# Patient Record
Sex: FEMALE | Race: WHITE
[De-identification: names, ages, dates, MRNs, and addresses within clinical notes are randomized per-mention and may not be internally consistent; named-entity substitution may affect disease eponyms.]

---

## 2017-03-10 ENCOUNTER — APPOINTMENT (OUTPATIENT)
Dept: OBGYN | Facility: CLINIC | Age: 32
End: 2017-03-10

## 2017-03-10 VITALS
HEIGHT: 67 IN | SYSTOLIC BLOOD PRESSURE: 100 MMHG | BODY MASS INDEX: 31.39 KG/M2 | DIASTOLIC BLOOD PRESSURE: 60 MMHG | WEIGHT: 200 LBS

## 2017-03-10 RX ORDER — MEDROXYPROGESTERONE ACETATE 150 MG/ML
150 INJECTION, SUSPENSION INTRAMUSCULAR
Qty: 0 | Refills: 0 | Status: COMPLETED | OUTPATIENT
Start: 2017-03-10

## 2017-03-10 RX ADMIN — MEDROXYPROGESTERONE ACETATE 0 MG/ML: 150 INJECTION, SUSPENSION INTRAMUSCULAR at 00:00

## 2017-03-13 ENCOUNTER — RECORD ABSTRACTING (OUTPATIENT)
Age: 32
End: 2017-03-13

## 2017-06-02 ENCOUNTER — APPOINTMENT (OUTPATIENT)
Dept: OBGYN | Facility: CLINIC | Age: 32
End: 2017-06-02

## 2017-06-02 VITALS
HEIGHT: 66 IN | SYSTOLIC BLOOD PRESSURE: 116 MMHG | BODY MASS INDEX: 31.98 KG/M2 | WEIGHT: 199 LBS | DIASTOLIC BLOOD PRESSURE: 80 MMHG

## 2017-06-02 RX ADMIN — MEDROXYPROGESTERONE ACETATE 0 MG/ML: 150 INJECTION, SUSPENSION INTRAMUSCULAR at 00:00

## 2017-06-06 ENCOUNTER — OUTPATIENT (OUTPATIENT)
Dept: OUTPATIENT SERVICES | Facility: HOSPITAL | Age: 32
LOS: 1 days | Discharge: HOME | End: 2017-06-06

## 2017-06-28 DIAGNOSIS — Z01.21 ENCOUNTER FOR DENTAL EXAMINATION AND CLEANING WITH ABNORMAL FINDINGS: ICD-10-CM

## 2017-07-27 ENCOUNTER — OUTPATIENT (OUTPATIENT)
Dept: OUTPATIENT SERVICES | Facility: HOSPITAL | Age: 32
LOS: 1 days | Discharge: HOME | End: 2017-07-27

## 2017-07-28 DIAGNOSIS — Z79.899 OTHER LONG TERM (CURRENT) DRUG THERAPY: ICD-10-CM

## 2017-07-28 DIAGNOSIS — F20.9 SCHIZOPHRENIA, UNSPECIFIED: ICD-10-CM

## 2017-08-04 ENCOUNTER — OUTPATIENT (OUTPATIENT)
Dept: OUTPATIENT SERVICES | Facility: HOSPITAL | Age: 32
LOS: 1 days | Discharge: HOME | End: 2017-08-04

## 2017-08-18 ENCOUNTER — OUTPATIENT (OUTPATIENT)
Dept: OUTPATIENT SERVICES | Facility: HOSPITAL | Age: 32
LOS: 1 days | Discharge: HOME | End: 2017-08-18

## 2017-08-18 DIAGNOSIS — K02.62 DENTAL CARIES ON SMOOTH SURFACE PENETRATING INTO DENTIN: ICD-10-CM

## 2017-08-25 ENCOUNTER — OUTPATIENT (OUTPATIENT)
Dept: OUTPATIENT SERVICES | Facility: HOSPITAL | Age: 32
LOS: 1 days | Discharge: HOME | End: 2017-08-25

## 2017-08-25 ENCOUNTER — APPOINTMENT (OUTPATIENT)
Dept: OBGYN | Facility: CLINIC | Age: 32
End: 2017-08-25

## 2017-08-25 VITALS — DIASTOLIC BLOOD PRESSURE: 80 MMHG | BODY MASS INDEX: 31.64 KG/M2 | WEIGHT: 196 LBS | SYSTOLIC BLOOD PRESSURE: 120 MMHG

## 2017-08-25 RX ORDER — MEDROXYPROGESTERONE ACETATE 150 MG/ML
150 INJECTION, SUSPENSION INTRAMUSCULAR
Qty: 0 | Refills: 0 | Status: COMPLETED | OUTPATIENT
Start: 2017-08-25

## 2017-08-25 RX ADMIN — MEDROXYPROGESTERONE ACETATE 0 MG/ML: 150 INJECTION, SUSPENSION INTRAMUSCULAR at 00:00

## 2017-11-17 ENCOUNTER — APPOINTMENT (OUTPATIENT)
Dept: OBGYN | Facility: CLINIC | Age: 32
End: 2017-11-17

## 2017-11-17 ENCOUNTER — RESULT REVIEW (OUTPATIENT)
Age: 32
End: 2017-11-17

## 2017-11-17 ENCOUNTER — OUTPATIENT (OUTPATIENT)
Dept: OUTPATIENT SERVICES | Facility: HOSPITAL | Age: 32
LOS: 1 days | Discharge: HOME | End: 2017-11-17

## 2017-11-17 VITALS — WEIGHT: 195 LBS | SYSTOLIC BLOOD PRESSURE: 118 MMHG | BODY MASS INDEX: 31.47 KG/M2 | DIASTOLIC BLOOD PRESSURE: 70 MMHG

## 2017-11-17 RX ORDER — MEDROXYPROGESTERONE ACETATE 150 MG/ML
150 INJECTION, SUSPENSION INTRAMUSCULAR
Qty: 0 | Refills: 0 | Status: COMPLETED | OUTPATIENT
Start: 2017-11-17

## 2017-11-17 RX ADMIN — MEDROXYPROGESTERONE ACETATE 0 MG/ML: 150 INJECTION, SUSPENSION INTRAMUSCULAR at 00:00

## 2017-11-20 LAB — HIV1+2 AB SPEC QL IA.RAPID: NONREACTIVE

## 2017-11-22 LAB
A VAGINAE DNA VAG NAA+PROBE-LOG#: NOT DETECTED
BV SCORE VAG QL NAA+PROBE: NORMAL
C GLABRATA DNA VAG QL NAA+PROBE: NOT DETECTED
C PARAP DNA VAG QL NAA+PROBE: NOT DETECTED
C TRACH RRNA SPEC QL NAA+PROBE: NOT DETECTED
C TROPICLS DNA VAG QL NAA+PROBE: NOT DETECTED
CANDIDA DNA VAG QL NAA+PROBE: NOT DETECTED
G VAGINALIS DNA VAG NAA+PROBE-LOG#: NOT DETECTED
LACTOBACILLUS DNA VAG NAA+PROBE-LOG#: 6.7
MEGASPHAERA SP DNA VAG NAA+PROBE-LOG#: NOT DETECTED
N GONORRHOEA RRNA SPEC QL NAA+PROBE: NOT DETECTED
T VAGINALIS RRNA SPEC QL NAA+PROBE: NOT DETECTED

## 2018-02-09 ENCOUNTER — OUTPATIENT (OUTPATIENT)
Dept: OUTPATIENT SERVICES | Facility: HOSPITAL | Age: 33
LOS: 1 days | Discharge: HOME | End: 2018-02-09

## 2018-02-09 ENCOUNTER — APPOINTMENT (OUTPATIENT)
Dept: OBGYN | Facility: CLINIC | Age: 33
End: 2018-02-09

## 2018-02-09 VITALS — BODY MASS INDEX: 30.99 KG/M2 | DIASTOLIC BLOOD PRESSURE: 70 MMHG | SYSTOLIC BLOOD PRESSURE: 110 MMHG | WEIGHT: 192 LBS

## 2018-02-09 DIAGNOSIS — Z30.09 ENCOUNTER FOR OTHER GENERAL COUNSELING AND ADVICE ON CONTRACEPTION: ICD-10-CM

## 2018-02-09 DIAGNOSIS — N60.12 DIFFUSE CYSTIC MASTOPATHY OF RIGHT BREAST: ICD-10-CM

## 2018-02-09 DIAGNOSIS — N60.11 DIFFUSE CYSTIC MASTOPATHY OF RIGHT BREAST: ICD-10-CM

## 2018-02-09 DIAGNOSIS — Z12.72 ENCOUNTER FOR SCREENING FOR MALIGNANT NEOPLASM OF VAGINA: ICD-10-CM

## 2018-02-09 RX ORDER — MEDROXYPROGESTERONE ACETATE 150 MG/ML
150 INJECTION, SUSPENSION INTRAMUSCULAR
Refills: 0 | Status: COMPLETED | OUTPATIENT
Start: 2018-02-09

## 2018-02-09 RX ADMIN — MEDROXYPROGESTERONE ACETATE 0 MG/ML: 150 INJECTION, SUSPENSION INTRAMUSCULAR at 00:00

## 2018-05-04 ENCOUNTER — APPOINTMENT (OUTPATIENT)
Dept: OBGYN | Facility: CLINIC | Age: 33
End: 2018-05-04

## 2018-05-04 ENCOUNTER — OUTPATIENT (OUTPATIENT)
Dept: OUTPATIENT SERVICES | Facility: HOSPITAL | Age: 33
LOS: 1 days | Discharge: HOME | End: 2018-05-04

## 2018-05-04 VITALS
BODY MASS INDEX: 30.61 KG/M2 | WEIGHT: 190.44 LBS | HEIGHT: 66 IN | SYSTOLIC BLOOD PRESSURE: 104 MMHG | DIASTOLIC BLOOD PRESSURE: 70 MMHG

## 2018-05-04 RX ADMIN — MEDROXYPROGESTERONE ACETATE 0 MG/ML: 150 INJECTION, SUSPENSION INTRAMUSCULAR at 00:00

## 2018-05-07 DIAGNOSIS — Z30.42 ENCOUNTER FOR SURVEILLANCE OF INJECTABLE CONTRACEPTIVE: ICD-10-CM

## 2018-05-09 ENCOUNTER — OUTPATIENT (OUTPATIENT)
Dept: OUTPATIENT SERVICES | Facility: HOSPITAL | Age: 33
LOS: 1 days | Discharge: HOME | End: 2018-05-09

## 2018-05-09 DIAGNOSIS — Z00.00 ENCOUNTER FOR GENERAL ADULT MEDICAL EXAMINATION WITHOUT ABNORMAL FINDINGS: ICD-10-CM

## 2018-07-20 ENCOUNTER — OUTPATIENT (OUTPATIENT)
Dept: OUTPATIENT SERVICES | Facility: HOSPITAL | Age: 33
LOS: 1 days | Discharge: HOME | End: 2018-07-20

## 2018-07-20 ENCOUNTER — APPOINTMENT (OUTPATIENT)
Dept: OBGYN | Facility: CLINIC | Age: 33
End: 2018-07-20

## 2018-07-20 VITALS — SYSTOLIC BLOOD PRESSURE: 110 MMHG | DIASTOLIC BLOOD PRESSURE: 62 MMHG | WEIGHT: 193 LBS | BODY MASS INDEX: 31.15 KG/M2

## 2018-07-20 RX ADMIN — MEDROXYPROGESTERONE ACETATE 0 MG/ML: 150 INJECTION, SUSPENSION INTRAMUSCULAR at 00:00

## 2018-07-23 DIAGNOSIS — Z30.42 ENCOUNTER FOR SURVEILLANCE OF INJECTABLE CONTRACEPTIVE: ICD-10-CM

## 2018-08-14 ENCOUNTER — OUTPATIENT (OUTPATIENT)
Dept: OUTPATIENT SERVICES | Facility: HOSPITAL | Age: 33
LOS: 1 days | Discharge: HOME | End: 2018-08-14

## 2018-08-15 DIAGNOSIS — R79.89 OTHER SPECIFIED ABNORMAL FINDINGS OF BLOOD CHEMISTRY: ICD-10-CM

## 2018-10-05 ENCOUNTER — OUTPATIENT (OUTPATIENT)
Dept: OUTPATIENT SERVICES | Facility: HOSPITAL | Age: 33
LOS: 1 days | Discharge: HOME | End: 2018-10-05

## 2018-10-05 ENCOUNTER — APPOINTMENT (OUTPATIENT)
Dept: OBGYN | Facility: CLINIC | Age: 33
End: 2018-10-05

## 2018-10-05 VITALS
SYSTOLIC BLOOD PRESSURE: 122 MMHG | HEIGHT: 66 IN | WEIGHT: 191.03 LBS | DIASTOLIC BLOOD PRESSURE: 78 MMHG | BODY MASS INDEX: 30.7 KG/M2

## 2018-10-05 RX ADMIN — MEDROXYPROGESTERONE ACETATE 0 MG/ML: 150 INJECTION, SUSPENSION INTRAMUSCULAR at 00:00

## 2018-10-09 DIAGNOSIS — Z30.42 ENCOUNTER FOR SURVEILLANCE OF INJECTABLE CONTRACEPTIVE: ICD-10-CM

## 2018-12-28 ENCOUNTER — APPOINTMENT (OUTPATIENT)
Dept: OBGYN | Facility: CLINIC | Age: 33
End: 2018-12-28

## 2018-12-28 ENCOUNTER — OUTPATIENT (OUTPATIENT)
Dept: OUTPATIENT SERVICES | Facility: HOSPITAL | Age: 33
LOS: 1 days | Discharge: HOME | End: 2018-12-28

## 2018-12-28 VITALS
SYSTOLIC BLOOD PRESSURE: 110 MMHG | WEIGHT: 189.19 LBS | BODY MASS INDEX: 30.54 KG/M2 | DIASTOLIC BLOOD PRESSURE: 76 MMHG

## 2018-12-28 RX ORDER — MEDROXYPROGESTERONE ACETATE 150 MG/ML
150 INJECTION, SUSPENSION INTRAMUSCULAR
Qty: 0 | Refills: 0 | Status: COMPLETED | OUTPATIENT
Start: 2018-12-28

## 2018-12-31 DIAGNOSIS — Z30.42 ENCOUNTER FOR SURVEILLANCE OF INJECTABLE CONTRACEPTIVE: ICD-10-CM

## 2019-02-27 ENCOUNTER — OUTPATIENT (OUTPATIENT)
Dept: OUTPATIENT SERVICES | Facility: HOSPITAL | Age: 34
LOS: 1 days | Discharge: HOME | End: 2019-02-27

## 2019-02-27 DIAGNOSIS — Z79.899 OTHER LONG TERM (CURRENT) DRUG THERAPY: ICD-10-CM

## 2019-02-27 DIAGNOSIS — E03.9 HYPOTHYROIDISM, UNSPECIFIED: ICD-10-CM

## 2019-02-27 DIAGNOSIS — K21.9 GASTRO-ESOPHAGEAL REFLUX DISEASE WITHOUT ESOPHAGITIS: ICD-10-CM

## 2019-03-29 ENCOUNTER — OUTPATIENT (OUTPATIENT)
Dept: OUTPATIENT SERVICES | Facility: HOSPITAL | Age: 34
LOS: 1 days | Discharge: HOME | End: 2019-03-29

## 2019-03-29 ENCOUNTER — APPOINTMENT (OUTPATIENT)
Dept: OBGYN | Facility: CLINIC | Age: 34
End: 2019-03-29

## 2019-03-29 VITALS
WEIGHT: 187 LBS | BODY MASS INDEX: 30.05 KG/M2 | DIASTOLIC BLOOD PRESSURE: 80 MMHG | SYSTOLIC BLOOD PRESSURE: 120 MMHG | HEIGHT: 66 IN

## 2019-03-29 DIAGNOSIS — Z30.42 ENCOUNTER FOR SURVEILLANCE OF INJECTABLE CONTRACEPTIVE: ICD-10-CM

## 2019-03-29 DIAGNOSIS — Z01.419 ENCOUNTER FOR GYNECOLOGICAL EXAMINATION (GENERAL) (ROUTINE) WITHOUT ABNORMAL FINDINGS: ICD-10-CM

## 2019-03-29 RX ADMIN — MEDROXYPROGESTERONE ACETATE 0 MG/ML: 150 INJECTION, SUSPENSION INTRAMUSCULAR at 00:00

## 2019-03-29 NOTE — END OF VISIT
[] : Resident [FreeTextEntry3] : 34 yo here for annual and DMPA redose, on time, happy with method, no complains. Denies breast issues at the moment. pap/hpv and vaginitis panel collected

## 2019-03-29 NOTE — PHYSICAL EXAM
[Awake] : awake [Alert] : alert [Soft] : soft [Oriented x3] : oriented to person, place, and time [Labia Majora] : labia major [Labia Minora] : labia minora [Normal] : clitoris [No Bleeding] : there was no active vaginal bleeding [Pap Obtained] : a Pap smear was performed [Uterine Adnexae] : were not tender and not enlarged [RRR, No Murmurs] : RRR, no murmurs [CTAB] : CTAB [Acute Distress] : no acute distress [Mass] : no breast mass [Nipple Discharge] : no nipple discharge [Axillary LAD] : no axillary lymphadenopathy [Tender] : non tender [Distended] : not distended

## 2019-04-03 LAB
C TRACH RRNA SPEC QL NAA+PROBE: NOT DETECTED
HPV HIGH+LOW RISK DNA PNL CVX: NOT DETECTED
N GONORRHOEA RRNA SPEC QL NAA+PROBE: NOT DETECTED
SOURCE AMPLIFICATION: NORMAL

## 2019-05-15 ENCOUNTER — OUTPATIENT (OUTPATIENT)
Dept: OUTPATIENT SERVICES | Facility: HOSPITAL | Age: 34
LOS: 1 days | Discharge: HOME | End: 2019-05-15

## 2019-05-15 DIAGNOSIS — F20.9 SCHIZOPHRENIA, UNSPECIFIED: ICD-10-CM

## 2019-05-15 DIAGNOSIS — Z79.899 OTHER LONG TERM (CURRENT) DRUG THERAPY: ICD-10-CM

## 2019-06-21 ENCOUNTER — OUTPATIENT (OUTPATIENT)
Dept: OUTPATIENT SERVICES | Facility: HOSPITAL | Age: 34
LOS: 1 days | Discharge: HOME | End: 2019-06-21

## 2019-06-21 ENCOUNTER — APPOINTMENT (OUTPATIENT)
Dept: OBGYN | Facility: CLINIC | Age: 34
End: 2019-06-21
Payer: COMMERCIAL

## 2019-06-21 VITALS — DIASTOLIC BLOOD PRESSURE: 72 MMHG | BODY MASS INDEX: 30.02 KG/M2 | WEIGHT: 186 LBS | SYSTOLIC BLOOD PRESSURE: 118 MMHG

## 2019-06-21 PROCEDURE — 99212 OFFICE O/P EST SF 10 MIN: CPT

## 2019-06-21 RX ADMIN — MEDROXYPROGESTERONE ACETATE 0 MG/ML: 150 INJECTION, SUSPENSION INTRAMUSCULAR at 00:00

## 2019-06-23 NOTE — COUNSELING
[Nutrition] : nutrition [Exercise] : exercise [Vitamins/Supplements] : vitamins/supplements [STD (testing, results, tx)] : STD (testing, results, tx) [Menstrual Calendar] : menstrual calendar [Contraception] : contraception [Emergency Contraception] : emergency contraception [Safe Sexual Practices] : safe sexual practices

## 2019-06-26 DIAGNOSIS — Z30.42 ENCOUNTER FOR SURVEILLANCE OF INJECTABLE CONTRACEPTIVE: ICD-10-CM

## 2019-09-06 ENCOUNTER — OUTPATIENT (OUTPATIENT)
Dept: OUTPATIENT SERVICES | Facility: HOSPITAL | Age: 34
LOS: 1 days | Discharge: HOME | End: 2019-09-06

## 2019-09-06 ENCOUNTER — APPOINTMENT (OUTPATIENT)
Dept: OBGYN | Facility: CLINIC | Age: 34
End: 2019-09-06
Payer: COMMERCIAL

## 2019-09-06 VITALS
HEIGHT: 66 IN | SYSTOLIC BLOOD PRESSURE: 114 MMHG | BODY MASS INDEX: 28.93 KG/M2 | DIASTOLIC BLOOD PRESSURE: 72 MMHG | WEIGHT: 180 LBS

## 2019-09-06 PROCEDURE — 99212 OFFICE O/P EST SF 10 MIN: CPT

## 2019-09-06 RX ADMIN — MEDROXYPROGESTERONE ACETATE 0 MG/ML: 150 INJECTION, SUSPENSION INTRAMUSCULAR at 00:00

## 2019-09-10 DIAGNOSIS — Z30.42 ENCOUNTER FOR SURVEILLANCE OF INJECTABLE CONTRACEPTIVE: ICD-10-CM

## 2019-09-25 NOTE — COUNSELING
[Nutrition] : nutrition [Exercise] : exercise [Vitamins/Supplements] : vitamins/supplements [Contraception] : contraception [Emergency Contraception] : emergency contraception [Safe Sexual Practices] : safe sexual practices

## 2019-10-29 ENCOUNTER — OUTPATIENT (OUTPATIENT)
Dept: OUTPATIENT SERVICES | Facility: HOSPITAL | Age: 34
LOS: 1 days | Discharge: HOME | End: 2019-10-29

## 2019-11-29 ENCOUNTER — OUTPATIENT (OUTPATIENT)
Dept: OUTPATIENT SERVICES | Facility: HOSPITAL | Age: 34
LOS: 1 days | Discharge: HOME | End: 2019-11-29

## 2019-11-29 ENCOUNTER — RESULT CHARGE (OUTPATIENT)
Age: 34
End: 2019-11-29

## 2019-11-29 ENCOUNTER — APPOINTMENT (OUTPATIENT)
Dept: OBGYN | Facility: CLINIC | Age: 34
End: 2019-11-29
Payer: COMMERCIAL

## 2019-11-29 ENCOUNTER — MED ADMIN CHARGE (OUTPATIENT)
Age: 34
End: 2019-11-29

## 2019-11-29 VITALS
WEIGHT: 183 LBS | HEIGHT: 66 IN | SYSTOLIC BLOOD PRESSURE: 120 MMHG | DIASTOLIC BLOOD PRESSURE: 80 MMHG | BODY MASS INDEX: 29.41 KG/M2

## 2019-11-29 DIAGNOSIS — Z30.42 ENCOUNTER FOR SURVEILLANCE OF INJECTABLE CONTRACEPTIVE: ICD-10-CM

## 2019-11-29 PROCEDURE — 99211 OFF/OP EST MAY X REQ PHY/QHP: CPT

## 2019-11-29 RX ADMIN — MEDROXYPROGESTERONE ACETATE 0 MG/ML: 150 INJECTION, SUSPENSION INTRAMUSCULAR at 00:00

## 2019-11-29 NOTE — COUNSELING
[STD (testing, results, tx)] : STD (testing, results, tx) [Vaccines] : vaccines [Contraception] : contraception

## 2019-11-30 LAB
HCG UR QL: NEGATIVE
QUALITY CONTROL: YES

## 2019-12-06 ENCOUNTER — OUTPATIENT (OUTPATIENT)
Dept: OUTPATIENT SERVICES | Facility: HOSPITAL | Age: 34
LOS: 1 days | Discharge: HOME | End: 2019-12-06

## 2019-12-13 ENCOUNTER — OUTPATIENT (OUTPATIENT)
Dept: OUTPATIENT SERVICES | Facility: HOSPITAL | Age: 34
LOS: 1 days | Discharge: HOME | End: 2019-12-13

## 2019-12-20 ENCOUNTER — OUTPATIENT (OUTPATIENT)
Dept: OUTPATIENT SERVICES | Facility: HOSPITAL | Age: 34
LOS: 1 days | Discharge: HOME | End: 2019-12-20

## 2019-12-27 ENCOUNTER — OUTPATIENT (OUTPATIENT)
Dept: OUTPATIENT SERVICES | Facility: HOSPITAL | Age: 34
LOS: 1 days | Discharge: HOME | End: 2019-12-27

## 2020-01-03 ENCOUNTER — OUTPATIENT (OUTPATIENT)
Dept: OUTPATIENT SERVICES | Facility: HOSPITAL | Age: 35
LOS: 1 days | Discharge: HOME | End: 2020-01-03

## 2020-01-03 DIAGNOSIS — K02.62 DENTAL CARIES ON SMOOTH SURFACE PENETRATING INTO DENTIN: ICD-10-CM

## 2020-01-31 ENCOUNTER — APPOINTMENT (OUTPATIENT)
Dept: OBGYN | Facility: CLINIC | Age: 35
End: 2020-01-31

## 2020-01-31 ENCOUNTER — OUTPATIENT (OUTPATIENT)
Dept: OUTPATIENT SERVICES | Facility: HOSPITAL | Age: 35
LOS: 1 days | Discharge: HOME | End: 2020-01-31

## 2020-01-31 DIAGNOSIS — Z02.9 ENCOUNTER FOR ADMINISTRATIVE EXAMINATIONS, UNSPECIFIED: ICD-10-CM

## 2020-02-21 ENCOUNTER — APPOINTMENT (OUTPATIENT)
Dept: OBGYN | Facility: CLINIC | Age: 35
End: 2020-02-21
Payer: COMMERCIAL

## 2020-02-21 ENCOUNTER — OUTPATIENT (OUTPATIENT)
Dept: OUTPATIENT SERVICES | Facility: HOSPITAL | Age: 35
LOS: 1 days | Discharge: HOME | End: 2020-02-21

## 2020-02-21 VITALS
SYSTOLIC BLOOD PRESSURE: 112 MMHG | WEIGHT: 183 LBS | HEIGHT: 66 IN | BODY MASS INDEX: 29.41 KG/M2 | DIASTOLIC BLOOD PRESSURE: 70 MMHG

## 2020-02-21 PROCEDURE — 99211 OFF/OP EST MAY X REQ PHY/QHP: CPT

## 2020-02-21 RX ADMIN — MEDROXYPROGESTERONE ACETATE 0 MG/ML: 150 INJECTION, SUSPENSION INTRAMUSCULAR at 00:00

## 2020-02-24 DIAGNOSIS — Z30.42 ENCOUNTER FOR SURVEILLANCE OF INJECTABLE CONTRACEPTIVE: ICD-10-CM

## 2020-05-08 ENCOUNTER — APPOINTMENT (OUTPATIENT)
Dept: OBGYN | Facility: CLINIC | Age: 35
End: 2020-05-08
Payer: COMMERCIAL

## 2020-05-08 ENCOUNTER — OUTPATIENT (OUTPATIENT)
Dept: OUTPATIENT SERVICES | Facility: HOSPITAL | Age: 35
LOS: 1 days | Discharge: HOME | End: 2020-05-08

## 2020-05-08 VITALS
HEIGHT: 66 IN | SYSTOLIC BLOOD PRESSURE: 116 MMHG | DIASTOLIC BLOOD PRESSURE: 76 MMHG | WEIGHT: 177.38 LBS | BODY MASS INDEX: 28.51 KG/M2

## 2020-05-08 DIAGNOSIS — Z86.59 PERSONAL HISTORY OF OTHER MENTAL AND BEHAVIORAL DISORDERS: ICD-10-CM

## 2020-05-08 PROCEDURE — 99395 PREV VISIT EST AGE 18-39: CPT

## 2020-05-08 RX ADMIN — MEDROXYPROGESTERONE ACETATE 0 MG/ML: 150 INJECTION, SUSPENSION INTRAMUSCULAR at 00:00

## 2020-05-08 NOTE — PHYSICAL EXAM
[Awake] : awake [Alert] : alert [Soft] : soft [Oriented x3] : oriented to person, place, and time [Labia Minora] : labia minora [Labia Majora] : labia major [Normal] : clitoris [No Bleeding] : there was no active vaginal bleeding [Anteversion] : anteverted [Uterine Adnexae] : were not tender and not enlarged [Acute Distress] : no acute distress [Mass] : no breast mass [Nipple Discharge] : no nipple discharge [Axillary LAD] : no axillary lymphadenopathy [Tender] : non tender [Distended] : not distended [Depressed Mood] : not depressed [Flat Affect] : affect not flat [Tenderness] : nontender [Discharge] : had no discharge

## 2020-05-08 NOTE — COUNSELING
[Nutrition] : nutrition [Exercise] : exercise [Vitamins/Supplements] : vitamins/supplements [Contraception] : contraception [Smoking Cessation] : smoking cessation [Safe Sexual Practices] : safe sexual practices

## 2020-07-31 ENCOUNTER — APPOINTMENT (OUTPATIENT)
Dept: OBGYN | Facility: CLINIC | Age: 35
End: 2020-07-31
Payer: COMMERCIAL

## 2020-07-31 ENCOUNTER — OUTPATIENT (OUTPATIENT)
Dept: OUTPATIENT SERVICES | Facility: HOSPITAL | Age: 35
LOS: 1 days | Discharge: HOME | End: 2020-07-31

## 2020-07-31 VITALS
DIASTOLIC BLOOD PRESSURE: 80 MMHG | BODY MASS INDEX: 29.25 KG/M2 | SYSTOLIC BLOOD PRESSURE: 122 MMHG | HEIGHT: 66 IN | WEIGHT: 182 LBS

## 2020-07-31 PROCEDURE — 99211 OFF/OP EST MAY X REQ PHY/QHP: CPT

## 2020-07-31 RX ADMIN — MEDROXYPROGESTERONE ACETATE 0 MG/ML: 150 INJECTION, SUSPENSION INTRAMUSCULAR at 00:00

## 2020-10-07 ENCOUNTER — OUTPATIENT (OUTPATIENT)
Dept: OUTPATIENT SERVICES | Facility: HOSPITAL | Age: 35
LOS: 1 days | Discharge: HOME | End: 2020-10-07

## 2020-10-16 ENCOUNTER — OUTPATIENT (OUTPATIENT)
Dept: OUTPATIENT SERVICES | Facility: HOSPITAL | Age: 35
LOS: 1 days | Discharge: HOME | End: 2020-10-16

## 2020-10-16 ENCOUNTER — APPOINTMENT (OUTPATIENT)
Dept: OBGYN | Facility: CLINIC | Age: 35
End: 2020-10-16
Payer: COMMERCIAL

## 2020-10-16 VITALS — WEIGHT: 185 LBS | BODY MASS INDEX: 29.86 KG/M2 | SYSTOLIC BLOOD PRESSURE: 118 MMHG | DIASTOLIC BLOOD PRESSURE: 68 MMHG

## 2020-10-16 PROCEDURE — 99212 OFFICE O/P EST SF 10 MIN: CPT

## 2020-10-16 NOTE — HISTORY OF PRESENT ILLNESS
[FreeTextEntry1] : 34 y/o G0, presents for Depo injection, she is happy with it. No complaints.\par \par Lot #: [id number]\par Exp: 9/30/2023\par \par

## 2020-10-19 DIAGNOSIS — K02.52 DENTAL CARIES ON PIT AND FISSURE SURFACE PENETRATING INTO DENTIN: ICD-10-CM

## 2020-10-23 ENCOUNTER — APPOINTMENT (OUTPATIENT)
Dept: OBGYN | Facility: CLINIC | Age: 35
End: 2020-10-23

## 2020-11-20 ENCOUNTER — OUTPATIENT (OUTPATIENT)
Dept: OUTPATIENT SERVICES | Facility: HOSPITAL | Age: 35
LOS: 1 days | Discharge: HOME | End: 2020-11-20

## 2020-11-27 ENCOUNTER — OUTPATIENT (OUTPATIENT)
Dept: OUTPATIENT SERVICES | Facility: HOSPITAL | Age: 35
LOS: 1 days | Discharge: HOME | End: 2020-11-27

## 2020-11-27 DIAGNOSIS — K02.62 DENTAL CARIES ON SMOOTH SURFACE PENETRATING INTO DENTIN: ICD-10-CM

## 2021-01-29 ENCOUNTER — RESULT CHARGE (OUTPATIENT)
Age: 36
End: 2021-01-29

## 2021-01-29 ENCOUNTER — OUTPATIENT (OUTPATIENT)
Dept: OUTPATIENT SERVICES | Facility: HOSPITAL | Age: 36
LOS: 1 days | Discharge: HOME | End: 2021-01-29

## 2021-01-29 ENCOUNTER — APPOINTMENT (OUTPATIENT)
Dept: OBGYN | Facility: CLINIC | Age: 36
End: 2021-01-29
Payer: COMMERCIAL

## 2021-01-29 VITALS
SYSTOLIC BLOOD PRESSURE: 100 MMHG | DIASTOLIC BLOOD PRESSURE: 60 MMHG | WEIGHT: 186 LBS | HEIGHT: 66 IN | BODY MASS INDEX: 29.89 KG/M2

## 2021-01-29 PROCEDURE — 99212 OFFICE O/P EST SF 10 MIN: CPT

## 2021-01-29 RX ORDER — MEDROXYPROGESTERONE ACETATE 150 MG/ML
150 INJECTION, SUSPENSION INTRAMUSCULAR
Qty: 0 | Refills: 0 | Status: COMPLETED | OUTPATIENT
Start: 2021-01-29

## 2021-01-29 RX ADMIN — MEDROXYPROGESTERONE ACETATE 0 MG/ML: 150 INJECTION, SUSPENSION INTRAMUSCULAR at 00:00

## 2021-01-29 NOTE — DISCUSSION/SUMMARY
[FreeTextEntry1] : A/P: 36yo P0 with LMP 1.5 years ago (negative Upreg) on DMPA for contraceptive injection\par -DMPA administered to right deltoid (Lot CR [id number], Exp May 31, 2024)\par -discussed safe sexual practices\par -RTC in 3 months for annual exam and DMPA injection or sooner if clinically indicated

## 2021-01-29 NOTE — PHYSICAL EXAM
[Appropriately responsive] : appropriately responsive [Alert] : alert [No Acute Distress] : no acute distress [No Lymphadenopathy] : no lymphadenopathy [Soft] : soft [Non-tender] : non-tender [No Lesions] : no lesions [No Mass] : no mass [Oriented x3] : oriented x3

## 2021-02-01 LAB
HCG UR QL: NEGATIVE
QUALITY CONTROL: YES

## 2021-04-09 ENCOUNTER — OUTPATIENT (OUTPATIENT)
Dept: OUTPATIENT SERVICES | Facility: HOSPITAL | Age: 36
LOS: 1 days | Discharge: HOME | End: 2021-04-09

## 2021-04-09 DIAGNOSIS — Z01.21 ENCOUNTER FOR DENTAL EXAMINATION AND CLEANING WITH ABNORMAL FINDINGS: ICD-10-CM

## 2021-04-16 ENCOUNTER — OUTPATIENT (OUTPATIENT)
Dept: OUTPATIENT SERVICES | Facility: HOSPITAL | Age: 36
LOS: 1 days | Discharge: HOME | End: 2021-04-16

## 2021-04-16 ENCOUNTER — APPOINTMENT (OUTPATIENT)
Dept: OBGYN | Facility: CLINIC | Age: 36
End: 2021-04-16
Payer: COMMERCIAL

## 2021-04-16 VITALS
WEIGHT: 187 LBS | BODY MASS INDEX: 30.05 KG/M2 | DIASTOLIC BLOOD PRESSURE: 66 MMHG | HEIGHT: 66 IN | SYSTOLIC BLOOD PRESSURE: 110 MMHG

## 2021-04-16 PROCEDURE — 99212 OFFICE O/P EST SF 10 MIN: CPT

## 2021-04-16 NOTE — HISTORY OF PRESENT ILLNESS
[FreeTextEntry1] : 36yo P0 with LMP 2 years ago secondary to DMPA, here for DMPA injection.  Last dose 1/29.  Happy with this form of birth control, desires to continue. No other complaints at this time.\par \par \par DMPA given in right deltoid\par Lot# [id number]\par Exp:1/31/25

## 2021-04-16 NOTE — DISCUSSION/SUMMARY
[FreeTextEntry1] : 36yo P0 with LMP 2 years ago, amenorrhea secondary to DMPA, here for DMPA injection.\par -DMPA given in right deltoid without difficulty\par -for annual and DMPA at next visit\par -RTC in 3months

## 2021-06-15 ENCOUNTER — EMERGENCY (EMERGENCY)
Facility: HOSPITAL | Age: 36
LOS: 0 days | Discharge: HOME | End: 2021-06-15
Attending: EMERGENCY MEDICINE | Admitting: EMERGENCY MEDICINE
Payer: COMMERCIAL

## 2021-06-15 VITALS
RESPIRATION RATE: 20 BRPM | TEMPERATURE: 98 F | SYSTOLIC BLOOD PRESSURE: 124 MMHG | OXYGEN SATURATION: 98 % | HEART RATE: 98 BPM | DIASTOLIC BLOOD PRESSURE: 70 MMHG | WEIGHT: 179.9 LBS | HEIGHT: 68 IN

## 2021-06-15 DIAGNOSIS — M79.644 PAIN IN RIGHT FINGER(S): ICD-10-CM

## 2021-06-15 DIAGNOSIS — M79.89 OTHER SPECIFIED SOFT TISSUE DISORDERS: ICD-10-CM

## 2021-06-15 DIAGNOSIS — F31.9 BIPOLAR DISORDER, UNSPECIFIED: ICD-10-CM

## 2021-06-15 DIAGNOSIS — M79.645 PAIN IN LEFT FINGER(S): ICD-10-CM

## 2021-06-15 PROCEDURE — 99283 EMERGENCY DEPT VISIT LOW MDM: CPT

## 2021-06-15 NOTE — ED PROVIDER NOTE - MUSCULOSKELETAL, MLM
+ swelling along right 4th digit w/ two rings that are unable to be removed and left 4th digit with one ring on that is unable to be removed  two; no joint tenderness

## 2021-06-15 NOTE — ED PROVIDER NOTE - OBJECTIVE STATEMENT
34 y/o F, PMHx Bipolar Disorder 34 y/o F, PMHx Bipolar Disorder, presents to the ED stating that she cannot remove her rings secondary to finger swelling. Patient has two rings on right 4th digit and one ring on left 4th digit. She attempted removal however was unsuccessful. She denies any inciting trauma/injury and denies any discomfort upon movement of fingers.

## 2021-06-15 NOTE — ED PROVIDER NOTE - ATTENDING CONTRIBUTION TO CARE
right 4th proximal digit swelling and pain after 2 rings have been stuck and too tight to remove. no trauma or fever. exam shows 2 rings that are unable to manipulated. distal finger vascular intact. plan is to remove rings.

## 2021-06-15 NOTE — ED PROCEDURE NOTE - PROCEDURE ADDITIONAL DETAILS
Ring cutter used to remove two rings on right 4th digit & string technique used to remove ring on left 4th digit

## 2021-06-15 NOTE — ED PROVIDER NOTE - CLINICAL SUMMARY MEDICAL DECISION MAKING FREE TEXT BOX
rings removed with ring cutter, finger was reeaxmined, with intact pulse and no skin erosions or signs of infection. patient to use ice to help with swelling.

## 2021-06-15 NOTE — ED PROVIDER NOTE - PATIENT PORTAL LINK FT
You can access the FollowMyHealth Patient Portal offered by [facility] by registering at the following website: [email or web address]. By joining [employer]’s FollowMyHealth portal, you will also be able to view your health information using other applications (apps) compatible with our system.

## 2021-07-16 ENCOUNTER — APPOINTMENT (OUTPATIENT)
Dept: OBGYN | Facility: CLINIC | Age: 36
End: 2021-07-16
Payer: COMMERCIAL

## 2021-07-16 ENCOUNTER — OUTPATIENT (OUTPATIENT)
Dept: OUTPATIENT SERVICES | Facility: HOSPITAL | Age: 36
LOS: 1 days | Discharge: HOME | End: 2021-07-16

## 2021-07-16 VITALS
SYSTOLIC BLOOD PRESSURE: 100 MMHG | DIASTOLIC BLOOD PRESSURE: 60 MMHG | BODY MASS INDEX: 31.18 KG/M2 | WEIGHT: 194 LBS | HEIGHT: 66 IN

## 2021-07-16 DIAGNOSIS — E03.9 HYPOTHYROIDISM, UNSPECIFIED: ICD-10-CM

## 2021-07-16 PROCEDURE — 99395 PREV VISIT EST AGE 18-39: CPT

## 2021-07-17 NOTE — DISCUSSION/SUMMARY
[FreeTextEntry1] : 34yo G0 currently amenorrheic secondary to DMPA, here for annual, doing well.\par -DMPA given in right deltoid without difficulty\par -counseled on safe sex practices\par -next pap smear in 2022\par -recommended continuing calcium and Vit D supplement\par -counseled on smoking cessation\par -RTC in 12wks for DMPA

## 2021-07-17 NOTE — HISTORY OF PRESENT ILLNESS
[FreeTextEntry1] : 36yo G0 with LMP 2 years ago presenting for annual exam and DMPA injection.  Last dose 4/16.  Not currently sexually active, currently amenorrheic secondary to DMPA.  Happy with this form of BC, desires to continue.  No other complaints at this time.  \par \par Obhx: nulligravid\par \par Health maintenance:\par Pap smear: 2019 NILM, HPV neg\par \par DMPA given in right deltoid\par Lot#[id number]\par Exp: 8/31/25

## 2021-10-08 ENCOUNTER — OUTPATIENT (OUTPATIENT)
Dept: OUTPATIENT SERVICES | Facility: HOSPITAL | Age: 36
LOS: 1 days | Discharge: HOME | End: 2021-10-08

## 2021-10-08 ENCOUNTER — APPOINTMENT (OUTPATIENT)
Dept: OBGYN | Facility: CLINIC | Age: 36
End: 2021-10-08
Payer: COMMERCIAL

## 2021-10-08 VITALS — SYSTOLIC BLOOD PRESSURE: 116 MMHG | WEIGHT: 207 LBS | BODY MASS INDEX: 33.41 KG/M2 | DIASTOLIC BLOOD PRESSURE: 74 MMHG

## 2021-10-08 PROCEDURE — 99213 OFFICE O/P EST LOW 20 MIN: CPT

## 2021-12-13 ENCOUNTER — EMERGENCY (EMERGENCY)
Facility: HOSPITAL | Age: 36
LOS: 0 days | Discharge: HOME | End: 2021-12-13
Attending: EMERGENCY MEDICINE | Admitting: PHYSICIAN ASSISTANT
Payer: MEDICAID

## 2021-12-13 VITALS
WEIGHT: 214.95 LBS | HEART RATE: 102 BPM | SYSTOLIC BLOOD PRESSURE: 126 MMHG | RESPIRATION RATE: 20 BRPM | DIASTOLIC BLOOD PRESSURE: 75 MMHG | OXYGEN SATURATION: 98 % | HEIGHT: 68 IN | TEMPERATURE: 99 F

## 2021-12-13 VITALS — HEART RATE: 98 BPM

## 2021-12-13 DIAGNOSIS — J06.9 ACUTE UPPER RESPIRATORY INFECTION, UNSPECIFIED: ICD-10-CM

## 2021-12-13 DIAGNOSIS — F31.9 BIPOLAR DISORDER, UNSPECIFIED: ICD-10-CM

## 2021-12-13 DIAGNOSIS — F17.200 NICOTINE DEPENDENCE, UNSPECIFIED, UNCOMPLICATED: ICD-10-CM

## 2021-12-13 DIAGNOSIS — R05.9 COUGH, UNSPECIFIED: ICD-10-CM

## 2021-12-13 PROCEDURE — 99284 EMERGENCY DEPT VISIT MOD MDM: CPT

## 2021-12-13 PROCEDURE — 71046 X-RAY EXAM CHEST 2 VIEWS: CPT | Mod: 26

## 2021-12-13 NOTE — ED PROVIDER NOTE - PATIENT PORTAL LINK FT
You can access the FollowMyHealth Patient Portal offered by [facility] by registering at the following website: [email or web address]. By joining [employer]’s FollowMyHealth portal, you will also be able to view your health information using other applications (apps) compatible with our system.

## 2021-12-13 NOTE — ED PROVIDER NOTE - OBJECTIVE STATEMENT
37 y/o female with hx Bipolar, smoker presents to the ED c/o "I have a cough dry, runny nose for the last 4 days." no fever/ chills/ cp/ sob

## 2021-12-13 NOTE — ED PROVIDER NOTE - ATTENDING CONTRIBUTION TO CARE
36 F smoker, bipolar, now with 4 days of non prodctive cough, vaccinated for covid and flu.  no fever. + congestion.   vss, nontoxic, well appearing, pink conj, anicteric, MMM, no exudates, neck supple, CTAB, RRR, equal radial pulses bilat, abd soft/nt/nd, no cva tend. no calves tend, no edema, no fnd. no rashes.   cxr, supportive care

## 2022-01-07 ENCOUNTER — APPOINTMENT (OUTPATIENT)
Dept: OBGYN | Facility: CLINIC | Age: 37
End: 2022-01-07

## 2022-01-07 ENCOUNTER — EMERGENCY (EMERGENCY)
Facility: HOSPITAL | Age: 37
LOS: 0 days | Discharge: HOME | End: 2022-01-07
Attending: EMERGENCY MEDICINE | Admitting: EMERGENCY MEDICINE
Payer: MEDICAID

## 2022-01-07 VITALS
SYSTOLIC BLOOD PRESSURE: 136 MMHG | RESPIRATION RATE: 20 BRPM | HEART RATE: 105 BPM | OXYGEN SATURATION: 96 % | DIASTOLIC BLOOD PRESSURE: 93 MMHG | TEMPERATURE: 101 F | HEIGHT: 68 IN

## 2022-01-07 DIAGNOSIS — G47.00 INSOMNIA, UNSPECIFIED: ICD-10-CM

## 2022-01-07 DIAGNOSIS — F17.210 NICOTINE DEPENDENCE, CIGARETTES, UNCOMPLICATED: ICD-10-CM

## 2022-01-07 PROBLEM — F31.9 BIPOLAR DISORDER, UNSPECIFIED: Chronic | Status: ACTIVE | Noted: 2021-12-13

## 2022-01-07 LAB — VALPROATE SERPL-MCNC: 67 UG/ML — SIGNIFICANT CHANGE UP (ref 50–100)

## 2022-01-07 PROCEDURE — 99284 EMERGENCY DEPT VISIT MOD MDM: CPT

## 2022-01-07 NOTE — ED ADULT NURSE NOTE - NSIMPLEMENTINTERV_GEN_ALL_ED
Implemented All Universal Safety Interventions:  [place] to call system. Call bell, personal items and telephone within reach. Instruct patient to call for assistance. Room bathroom lighting operational. Non-slip footwear when patient is off stretcher. Physically safe environment: no spills, clutter or unnecessary equipment. Stretcher in lowest position, wheels locked, appropriate side rails in place.

## 2022-01-07 NOTE — ED PROVIDER NOTE - ATTENDING CONTRIBUTION TO CARE
c/o insominia.  this is a subacute c/o.  VS noted.  Chest clear.  Heart RR no murmur.  abd NT.  Ext FROM.  =pulses.  melatonin trial recommended.

## 2022-01-07 NOTE — ED PROVIDER NOTE - NSFOLLOWUPINSTRUCTIONS_ED_ALL_ED_FT
Please follow up with your primary care physician within 24-72 hours and return immediately if symptoms worsen. Please take over the counter melatonin starting with 1mg.       Insomnia    WHAT YOU NEED TO KNOW:    What is insomnia? Insomnia is a condition that makes it hard to fall or stay asleep. Lack of sleep can lead to attention or memory problems during the day. You may also be cardona, depressed, clumsy, or have headaches.    What increases my risk for insomnia?   •Older age      •Stress or worry      •A medical condition, such as sleep apnea, GERD, COPD, or asthma      •A mental health condition, such as depression or anxiety      •Blood pressure medicines or antidepressants      •Odd work schedules or frequent travel      How is insomnia diagnosed? Your healthcare provider will ask when your symptoms began and how often you cannot sleep. He or she will ask if you take any medicines that can cause insomnia, such as blood pressure medicine. He or she will ask if you have a medical condition, such as GERD, or a mental health condition, such as depression. You may also take a survey about your sleep.    How is insomnia treated?   •Cognitive behavioral therapy (CBT) helps you find ways to relax, decrease stress, and improve sleep.       •Medicines may help you sleep more regularly or help you feel less anxious. Take them as directed.      What can I do to improve my sleep?   •Create a sleep schedule. Try to go to sleep and wake up at the same times every day. Keep a record of your sleep patterns, and any sleeping problems you have. Bring the record to follow-up visits with healthcare providers.      •Do not take naps. Naps could make it hard for you to fall asleep at bedtime.      •Keep your bedroom cool, quiet, and dark. Turn on white noise, such as a fan, to help you relax. Do not use your bed for any activity that will keep you awake. Do not read, exercise, eat, or watch TV in your bedroom.      •Get up if you do not fall asleep within 20 minutes. Move to another room and do something relaxing until you become sleepy.      •Limit caffeine, alcohol, and food to earlier in the day. Only drink caffeine in the morning. Do not drink alcohol within 6 hours of bedtime. Do not eat a heavy meal right before you go to bed.      •Exercise regularly. Daily exercise may help you sleep better. Do not exercise within 4 hours of bedtime.      When should I contact my healthcare provider?   •Your symptoms do not get better, or they get worse.      •You begin to use drugs or alcohol to fall asleep.      •You have questions or concerns about your condition or care.      CARE AGREEMENT:    You have the right to help plan your care. Learn about your health condition and how it may be treated. Discuss treatment options with your healthcare providers to decide what care you want to receive. You always have the right to refuse treatment.        © Copyright [employer] 2022

## 2022-01-07 NOTE — ED PROVIDER NOTE - OBJECTIVE STATEMENT
35 yo female with a pmh of bipolar disorder presents c/o troubles sleeping for over 6 months. pt states to take medication to help her sleep (pt unaware of the name of medication) for 7 years with little help. pt states to get about 3 hours of sleep nightly. pt states to have missed her Depakote dose this morning and will like her levels checked. pt denies any other symptoms including fevers, chill, headache, SI/HI/hallucinations, recent illness/travel, cough, abdominal pain, chest pain, or SOB.

## 2022-01-07 NOTE — ED PROVIDER NOTE - CARE PROVIDER_API CALL
Chapin Montgomery (DO)  Critical Care Medicine; Pulmonary Disease; Sleep Medicine  [address] [place] [address], [place] [address]  Phone: [phone]  Fax: [phone]  Follow Up Time: 1-3 Days

## 2022-01-07 NOTE — ED PROVIDER NOTE - PATIENT PORTAL LINK FT
You can access the FollowMyHealth Patient Portal offered by [facility] by registering at the following website: [email or web address]. By joining [employer]’s FollowMyHealth portal, you will also be able to view your health information using other applications (apps) compatible with our system.

## 2022-01-07 NOTE — ED PROVIDER NOTE - PHYSICAL EXAMINATION
Gen: NAD, AOx3  Head: NCAT  HEENT: PERRL, oral mucosa moist, normal conjunctiva, oropharynx clear without exudate or erythema  Lung: CTAB, no respiratory distress, no wheezing, rales, rhonchi  CV: normal s1/s2, rrr, Normal perfusion, pulses 2+ throughout  Abd: soft, NTND, no CVA tenderness  Genitourinary: no pelvic tenderness  MSK: No edema, no visible deformities, full range of motion in all 4 extremities  Neuro: CN II-XII grossly intact, No focal neurologic deficits, sensation intact, strength 5/5 BUE/BLE, steady gait   Skin: No rash   Psych: normal affect

## 2022-01-07 NOTE — ED PROVIDER NOTE - CLINICAL SUMMARY MEDICAL DECISION MAKING FREE TEXT BOX
lab sent at pt's request that will be f/u by her PMD.  In my opinion, out patient treatment and follow up are appropriate.  in my opinion, no acute medical or surgical emergency exists at this time.

## 2022-01-07 NOTE — ED ADULT TRIAGE NOTE - HEIGHT IN FEET
I cannot do peer to peer because I am not a doctor. Message sent to the provider. See the 4/9/2020 message.   5

## 2022-01-17 ENCOUNTER — INPATIENT (INPATIENT)
Facility: HOSPITAL | Age: 37
LOS: 16 days | Discharge: HOME | End: 2022-02-03
Attending: PSYCHIATRY & NEUROLOGY | Admitting: PSYCHIATRY & NEUROLOGY
Payer: MEDICAID

## 2022-01-17 VITALS
HEIGHT: 68 IN | RESPIRATION RATE: 15 BRPM | SYSTOLIC BLOOD PRESSURE: 141 MMHG | HEART RATE: 100 BPM | DIASTOLIC BLOOD PRESSURE: 91 MMHG | OXYGEN SATURATION: 99 % | TEMPERATURE: 97 F

## 2022-01-17 DIAGNOSIS — F25.0 SCHIZOAFFECTIVE DISORDER, BIPOLAR TYPE: ICD-10-CM

## 2022-01-17 LAB
ALBUMIN SERPL ELPH-MCNC: 5 G/DL — SIGNIFICANT CHANGE UP (ref 3.5–5.2)
ALP SERPL-CCNC: 70 U/L — SIGNIFICANT CHANGE UP (ref 30–115)
ALT FLD-CCNC: 28 U/L — SIGNIFICANT CHANGE UP (ref 0–41)
AMPHET UR-MCNC: NEGATIVE — SIGNIFICANT CHANGE UP
ANION GAP SERPL CALC-SCNC: 16 MMOL/L — HIGH (ref 7–14)
APAP SERPL-MCNC: <5 UG/ML — LOW (ref 10–30)
APPEARANCE UR: CLEAR — SIGNIFICANT CHANGE UP
AST SERPL-CCNC: 29 U/L — SIGNIFICANT CHANGE UP (ref 0–41)
BACTERIA # UR AUTO: NEGATIVE — SIGNIFICANT CHANGE UP
BARBITURATES UR SCN-MCNC: NEGATIVE — SIGNIFICANT CHANGE UP
BASOPHILS # BLD AUTO: 0.03 K/UL — SIGNIFICANT CHANGE UP (ref 0–0.2)
BASOPHILS NFR BLD AUTO: 0.4 % — SIGNIFICANT CHANGE UP (ref 0–1)
BENZODIAZ UR-MCNC: NEGATIVE — SIGNIFICANT CHANGE UP
BILIRUB DIRECT SERPL-MCNC: <0.2 MG/DL — SIGNIFICANT CHANGE UP (ref 0–0.3)
BILIRUB INDIRECT FLD-MCNC: >0.2 MG/DL — SIGNIFICANT CHANGE UP (ref 0.2–1.2)
BILIRUB SERPL-MCNC: 0.4 MG/DL — SIGNIFICANT CHANGE UP (ref 0.2–1.2)
BILIRUB UR-MCNC: NEGATIVE — SIGNIFICANT CHANGE UP
BUN SERPL-MCNC: 12 MG/DL — SIGNIFICANT CHANGE UP (ref 10–20)
CALCIUM SERPL-MCNC: 9.8 MG/DL — SIGNIFICANT CHANGE UP (ref 8.5–10.1)
CHLORIDE SERPL-SCNC: 100 MMOL/L — SIGNIFICANT CHANGE UP (ref 98–110)
CO2 SERPL-SCNC: 21 MMOL/L — SIGNIFICANT CHANGE UP (ref 17–32)
COCAINE METAB.OTHER UR-MCNC: NEGATIVE — SIGNIFICANT CHANGE UP
COLOR SPEC: YELLOW — SIGNIFICANT CHANGE UP
CREAT SERPL-MCNC: 0.8 MG/DL — SIGNIFICANT CHANGE UP (ref 0.7–1.5)
DIFF PNL FLD: NEGATIVE — SIGNIFICANT CHANGE UP
DRUG SCREEN 1, URINE RESULT: SIGNIFICANT CHANGE UP
EOSINOPHIL # BLD AUTO: 0.07 K/UL — SIGNIFICANT CHANGE UP (ref 0–0.7)
EOSINOPHIL NFR BLD AUTO: 0.9 % — SIGNIFICANT CHANGE UP (ref 0–8)
EPI CELLS # UR: 7 /HPF — HIGH (ref 0–5)
ETHANOL SERPL-MCNC: <10 MG/DL — SIGNIFICANT CHANGE UP
GLUCOSE SERPL-MCNC: 88 MG/DL — SIGNIFICANT CHANGE UP (ref 70–99)
GLUCOSE UR QL: NEGATIVE — SIGNIFICANT CHANGE UP
HCG UR QL: NEGATIVE — SIGNIFICANT CHANGE UP
HCT VFR BLD CALC: 48.4 % — HIGH (ref 37–47)
HGB BLD-MCNC: 16 G/DL — SIGNIFICANT CHANGE UP (ref 12–16)
HYALINE CASTS # UR AUTO: 2 /LPF — SIGNIFICANT CHANGE UP (ref 0–7)
IMM GRANULOCYTES NFR BLD AUTO: 0.3 % — SIGNIFICANT CHANGE UP (ref 0.1–0.3)
KETONES UR-MCNC: SIGNIFICANT CHANGE UP
LEUKOCYTE ESTERASE UR-ACNC: ABNORMAL
LYMPHOCYTES # BLD AUTO: 3.34 K/UL — SIGNIFICANT CHANGE UP (ref 1.2–3.4)
LYMPHOCYTES # BLD AUTO: 43.1 % — SIGNIFICANT CHANGE UP (ref 20.5–51.1)
MCHC RBC-ENTMCNC: 30.8 PG — SIGNIFICANT CHANGE UP (ref 27–31)
MCHC RBC-ENTMCNC: 33.1 G/DL — SIGNIFICANT CHANGE UP (ref 32–37)
MCV RBC AUTO: 93.3 FL — SIGNIFICANT CHANGE UP (ref 81–99)
METHADONE UR-MCNC: NEGATIVE — SIGNIFICANT CHANGE UP
MONOCYTES # BLD AUTO: 0.86 K/UL — HIGH (ref 0.1–0.6)
MONOCYTES NFR BLD AUTO: 11.1 % — HIGH (ref 1.7–9.3)
NEUTROPHILS # BLD AUTO: 3.43 K/UL — SIGNIFICANT CHANGE UP (ref 1.4–6.5)
NEUTROPHILS NFR BLD AUTO: 44.2 % — SIGNIFICANT CHANGE UP (ref 42.2–75.2)
NITRITE UR-MCNC: NEGATIVE — SIGNIFICANT CHANGE UP
NRBC # BLD: 0 /100 WBCS — SIGNIFICANT CHANGE UP (ref 0–0)
OPIATES UR-MCNC: NEGATIVE — SIGNIFICANT CHANGE UP
PCP UR-MCNC: NEGATIVE — SIGNIFICANT CHANGE UP
PH UR: 6 — SIGNIFICANT CHANGE UP (ref 5–8)
PLATELET # BLD AUTO: 229 K/UL — SIGNIFICANT CHANGE UP (ref 130–400)
POTASSIUM SERPL-MCNC: 3.9 MMOL/L — SIGNIFICANT CHANGE UP (ref 3.5–5)
POTASSIUM SERPL-SCNC: 3.9 MMOL/L — SIGNIFICANT CHANGE UP (ref 3.5–5)
PROPOXYPHENE QUALITATIVE URINE RESULT: NEGATIVE — SIGNIFICANT CHANGE UP
PROT SERPL-MCNC: 7.8 G/DL — SIGNIFICANT CHANGE UP (ref 6–8)
PROT UR-MCNC: SIGNIFICANT CHANGE UP
RBC # BLD: 5.19 M/UL — SIGNIFICANT CHANGE UP (ref 4.2–5.4)
RBC # FLD: 12.2 % — SIGNIFICANT CHANGE UP (ref 11.5–14.5)
RBC CASTS # UR COMP ASSIST: 3 /HPF — SIGNIFICANT CHANGE UP (ref 0–4)
SALICYLATES SERPL-MCNC: <0.3 MG/DL — LOW (ref 4–30)
SARS-COV-2 RNA SPEC QL NAA+PROBE: SIGNIFICANT CHANGE UP
SODIUM SERPL-SCNC: 137 MMOL/L — SIGNIFICANT CHANGE UP (ref 135–146)
SP GR SPEC: 1.03 — SIGNIFICANT CHANGE UP (ref 1.01–1.03)
THC UR QL: NEGATIVE — SIGNIFICANT CHANGE UP
UROBILINOGEN FLD QL: ABNORMAL
VALPROATE SERPL-MCNC: 76 UG/ML — SIGNIFICANT CHANGE UP (ref 50–100)
WBC # BLD: 7.75 K/UL — SIGNIFICANT CHANGE UP (ref 4.8–10.8)
WBC # FLD AUTO: 7.75 K/UL — SIGNIFICANT CHANGE UP (ref 4.8–10.8)
WBC UR QL: 5 /HPF — SIGNIFICANT CHANGE UP (ref 0–5)

## 2022-01-17 PROCEDURE — 99285 EMERGENCY DEPT VISIT HI MDM: CPT

## 2022-01-17 PROCEDURE — 93010 ELECTROCARDIOGRAM REPORT: CPT

## 2022-01-17 RX ORDER — DIPHENHYDRAMINE HCL 50 MG
50 CAPSULE ORAL EVERY 6 HOURS
Refills: 0 | Status: DISCONTINUED | OUTPATIENT
Start: 2022-01-18 | End: 2022-01-24

## 2022-01-17 RX ORDER — DIVALPROEX SODIUM 500 MG/1
1000 TABLET, DELAYED RELEASE ORAL AT BEDTIME
Refills: 0 | Status: DISCONTINUED | OUTPATIENT
Start: 2022-01-18 | End: 2022-01-19

## 2022-01-17 RX ORDER — GABAPENTIN 400 MG/1
300 CAPSULE ORAL AT BEDTIME
Refills: 0 | Status: DISCONTINUED | OUTPATIENT
Start: 2022-01-18 | End: 2022-01-25

## 2022-01-17 RX ORDER — BENZTROPINE MESYLATE 1 MG
2 TABLET ORAL DAILY
Refills: 0 | Status: DISCONTINUED | OUTPATIENT
Start: 2022-01-18 | End: 2022-02-03

## 2022-01-17 RX ORDER — HYDROXYZINE HCL 10 MG
50 TABLET ORAL EVERY 6 HOURS
Refills: 0 | Status: DISCONTINUED | OUTPATIENT
Start: 2022-01-18 | End: 2022-01-24

## 2022-01-17 RX ORDER — HALOPERIDOL DECANOATE 100 MG/ML
5 INJECTION INTRAMUSCULAR EVERY 6 HOURS
Refills: 0 | Status: DISCONTINUED | OUTPATIENT
Start: 2022-01-18 | End: 2022-01-25

## 2022-01-17 RX ORDER — FAMOTIDINE 10 MG/ML
20 INJECTION INTRAVENOUS
Refills: 0 | Status: DISCONTINUED | OUTPATIENT
Start: 2022-01-18 | End: 2022-02-03

## 2022-01-17 RX ADMIN — Medication 1 MILLIGRAM(S): at 17:55

## 2022-01-17 NOTE — ED BEHAVIORAL HEALTH ASSESSMENT NOTE - OTHER
per collateral, patient "lives with a family who takes care of her and feeds her"; per patient's paper chart she lives in [facility] at [place] didn't appear to be obviously responding to internal stimuli deferred Stepmother and her  Patient offers expletives when asked about mood Word salad

## 2022-01-17 NOTE — ED BEHAVIORAL HEALTH ASSESSMENT NOTE - SUMMARY
Ms. Blas is a 36 year old single domiciled unemployed woman with past medical history of hypothyroidism and past psychiatric history of schizoaffective disorder bipolar type who was brought in by ambulance at the advice of her psychiatrist for increasingly erratic and manic behavior in the setting of medication non-compliance. On multiple assessments, patient was acutely manic and largely unable to participate in interview.    Patient's current presentation is consistent with anastacio secondary to bipolar disorder. She is disorganized, intermittently oriented to self, place, and time, but not to situation; she is agitated, sexually aggressive, and demonstrates labile mood; she is highly irritable and distractible with incomprehensible speech. Per collateral, this presentation is typical for her in the setting of medication non-compliance which is the reason she was referred to the ED by her psychiatrist. Of note, patient presented to [facility] ED 10 days ago with complaints of insomnia. Her living situation is unclear due to collateral being poor historian and potentially not in close touch with patient, however per chart she lives at [facility]. LFTs WNL, blood levels of valproate are within therapeutic limits (76), will restart home dose. Blood alcohol levels negative, however per ED note patient smelled like alcohol on arrival--no signs of withdrawal noted except anxiety/agitation, patient received 1 mg IM Ativan in ED. Per paper chart, patient received last dose of Haldol Decanoate on "1/4/21" and "next due 2/1/21" -- unclear if this is the right date. As patient is acutely manic and unable to care for self, she meets criteria for inpatient psychiatric hospitalization. Ms. Blas is a 36 year old single domiciled unemployed woman with past medical history of hypothyroidism and past psychiatric history of schizoaffective disorder bipolar type who was brought in by ambulance at the advice of her psychiatrist for increasingly erratic and manic behavior in the setting of medication non-compliance. On multiple assessments, patient was acutely manic and largely unable to participate in interview.    Patient's current presentation is consistent with anastacio secondary to bipolar disorder. She is disorganized, intermittently oriented to self, place, and time, but not to situation; she is agitated, sexually aggressive, grandiose, and demonstrates labile mood; she is highly irritable and distractible with incomprehensible speech. Per collateral, this presentation is typical for her in the setting of medication non-compliance which is the reason she was referred to the ED by her psychiatrist. Of note, patient presented to [facility] ED 10 days ago with complaints of insomnia. Her living situation is unclear due to collateral being poor historian and potentially not in close touch with patient, however per chart she lives at [facility]. LFTs WNL, blood levels of valproate are within therapeutic limits (76), will restart home dose. Blood alcohol levels negative, however per ED note patient smelled like alcohol on arrival--no signs of withdrawal noted except anxiety/agitation, patient received 1 mg IM Ativan in ED. Per paper chart, patient received last dose of Haldol Decanoate on "1/4/21" and "next due 2/1/21" -- unclear if this is the right date. As patient is acutely manic and unable to care for self, she meets criteria for inpatient psychiatric hospitalization.

## 2022-01-17 NOTE — ED PROVIDER NOTE - OBJECTIVE STATEMENT
37 yo F with PMH of hypothyroid, BPD, and  schizoaffective on haldol q4 weeks (last Jan 4) and depakote 500 BID sent in by Psychiatrist (Dr. Yan Guevara, [phone]) for worsening mental status, anastacio, and delusions. Says patient has been non-compliant with medications in the past few weeks. Upon arrival, patient with pressured speech, flights of ideation, tangential thoughts. Denies any complaints at this time including fever, headache, vision changes, dizziness, CP, SOB, NVD, dysuria. Patient endorses drinking 10 sips of beer PTA, but denies drug use, SI/HI, AVH.

## 2022-01-17 NOTE — ED BEHAVIORAL HEALTH ASSESSMENT NOTE - RISK ASSESSMENT
Unable to determine Suicide Risk Risk factors: irritability, agitation, insomnia  Protective factors: residential stability, good treatment response

## 2022-01-17 NOTE — CHART NOTE - NSCHARTNOTEFT_GEN_A_CORE
Psychiatry team attempted to assess patient, however per 1:1 patient received PRN Ativan 1 mg PO for agitation at 17:20 and has been sleeping deeply since then. Attempted to rouse patient to voice, however patient not responsive. Will re-attempt once more alert. Psychiatry team attempted to assess patient, however per 1:1 patient received PRN Ativan 1 mg IM for agitation at 17:20 and has been sleeping deeply since then. Attempted to rouse patient to voice, however patient not responsive. Will re-attempt once more alert.

## 2022-01-17 NOTE — ED BEHAVIORAL HEALTH ASSESSMENT NOTE - DETAILS
unable to assess, patient too agitated/disorganized SI-S contacted, aware of patient transfer patient informed about plan, did not appear to object to going to hospital

## 2022-01-17 NOTE — ED PROVIDER NOTE - ATTENDING CONTRIBUTION TO CARE
36-year-old female past medical history of schizoaffective, presents with manic behavior and medication noncompliance.  Patient admits to drinking alcohol prior to arrival.  Patient sent by her psychiatrist of 10 to 12 years Dr. Guevara, for not taking medications for the last 2 to 3 weeks.  Patient has been manic and delusional.  Patient takes Haldol and Depakote.  No fever cough.  No chest pain shortness of breath.  No headache numbness weakness.  No abdominal pain nausea vomiting diarrhea.  No recent head trauma.  No drugs.    On exam, AFVSS, Well appearing, No acute distress, NCAT, EOMI, PERRLA, MMM, Neck supple, LCTAB, RRR nl s1s2 No mrg, Abdomen Soft NTND, AAOx3, No Focal Deficits, No LE edema or calf TTP, patient with odd behavior manic speaks fast    A/P; schizoaffective med noncompliance decompensation–possible alcohol and tox, will do labs EKG Depakote and alcohol level we will put one-to-one in place and get psychiatry consult

## 2022-01-17 NOTE — ED BEHAVIORAL HEALTH ASSESSMENT NOTE - CURRENT MEDICATION
Per [facility] paper chart, patient currently takes:  Haldol Decanoate  mg q 4 weeks, "last dose given 1/4/21" "next due 2/1/21"  Depakote ER 1000 mg PO at 9 PM  Gabapentin 300 mg PO at 9 PM  Benztropine 2 mg PO at 9 AM  Diphenhydramine 25 mg PO 9 PM  Synthroid 50 mcg PO 7 AM  Famotidine 20 mg PO 9 AM/PM (?unclear handwriting)

## 2022-01-17 NOTE — ED BEHAVIORAL HEALTH ASSESSMENT NOTE - DESCRIPTION
Date: 17-Jan-2022 20:57.     Progress: Patient became increasingly agitated and unable to de-escalate verbally.  Ativan 1 mg IM given, patient placed on monitor.  Patient with one-to-one in place.     Per 1:1 RN, patient urinated on herself while in bed and became agitated with RN for trying to help change her, saying "don't touch me!" patient lives at [facility] hypothyroidism

## 2022-01-17 NOTE — ED BEHAVIORAL HEALTH ASSESSMENT NOTE - OTHER PAST PSYCHIATRIC HISTORY (INCLUDE DETAILS REGARDING ONSET, COURSE OF ILLNESS, INPATIENT/OUTPATIENT TREATMENT)
Patient with multiple past [facility] admissions per collateral, has been with psychiatrist Dr. Guevara for 10-12 years. Diagnosed with schizoaffective disorder bipolar type

## 2022-01-17 NOTE — ED PROVIDER NOTE - PHYSICAL EXAMINATION
CONSTITUTIONAL: unkempt-appearing, in NAD  SKIN: Warm dry, normal skin turgor  HEAD: NCAT  EYES: EOMI, PERRLA, no scleral icterus, conjunctiva pink  ENT: normal pharynx with no erythema or exudates  NECK: Supple; non tender. Full ROM.  CARD: RRR, no murmurs.  RESP: clear to ausculation b/l. No crackles or wheezing.  ABD: soft, non-tender, non-distended, no rebound or guarding.  EXT: Full ROM, no bony tenderness, no pedal edema, no calf tenderness  NEURO: normal motor. normal sensory. Normal gait.  PSYCH: cooperative, manic, pressured speech

## 2022-01-17 NOTE — ED BEHAVIORAL HEALTH ASSESSMENT NOTE - HPI (INCLUDE ILLNESS QUALITY, SEVERITY, DURATION, TIMING, CONTEXT, MODIFYING FACTORS, ASSOCIATED SIGNS AND SYMPTOMS)
Ms. Blas is a 36 year old single domiciled unemployed woman with past medical history of hypothyroidism and past psychiatric history of schizoaffective disorder bipolar type who was brought in by ambulance at the advice of her psychiatrist for increasingly erratic and manic behavior in the setting of medication non-compliance. On multiple assessments, patient was acutely manic and largely unable to participate in interview.    On first approach, patient is asleep in bed and responsive to touch. She immediately starts talking about Vineet upon awakening, however content of speech is not intelligible. Patient demands multiple interpreters at various times (Czech, Tongan, Kyrgyz) but is noted to understand and speak English well. She is able to say that she's in [place] and that it is January 2022 but that she doesn't know the date. She identifies herself as "Ashlyn Blas, [place] Her speech is intermittently garbled and she provides an approximate phone number of her stepmother for collateral.    Stepmother Idania contacted, she is Czech speaking and requested her  Jaquan speak in her place. He reports that the patient called them this morning saying that she's in the hospital. He reports that she sounded off, "not normal" and that this happens to her when she stops taking her medication, which she hasn't been taking in a while. She said odd things like "the President says hi to you". He reports that patient "lives with a family who takes care of her and feeds her" but is unable to provide further details of the arrangement. He reports that she hasn't been back to the house in a while and that's why she hasn't been taking her medications. He provides a contact number for the family as [phone]. Jaquan reports that patient's last known inpatient stay was 6 months ago when she went to [facility] near [facility] and called his home repeatedly and was put in the hospital. He notes that when patient is hospitalized and put back on her medication she responds well and appears completely normal. She is not , no children, and not employed.    On reassessment, patient is being escorted back to her room by security for attempting to elope. She is agitated and cursing at staff members. She spills water and coffee on the floor and is preoccupied with cleaning it up. She reports that she lives by herself "with a big doggie". She reports that her mother passed away at age 28 from an issue with her stomach and that her father passed away in 2006. She reports that her psychiatrist recently switched her to Zyprexa (?unclear).

## 2022-01-17 NOTE — ED BEHAVIORAL HEALTH ASSESSMENT NOTE - VIOLENCE RISK FACTORS:
Violent ideation/threat/speech/Affective dysregulation/Impulsivity/Lack of insight into violence risk/need for treatment/Noncompliance with treatment/Irritability/Elopement history or risk

## 2022-01-17 NOTE — ED ADULT NURSE NOTE - OBJECTIVE STATEMENT
pt here admits to ETOH use. pt denies SI/HI/AH/VH. Pt psychiatrist states pt has not been taking medications, not sleeping acting manic. all belongings given to security.

## 2022-01-17 NOTE — ED BEHAVIORAL HEALTH ASSESSMENT NOTE - PSYCHIATRIC ISSUES AND PLAN (INCLUDE STANDING AND PRN MEDICATION)
Schizoaffective disorder: Depakote ER 1000 mg PO qhs; ***PRNs  for agitation not amenable to verbal redirection, can give Haldol 2mg PO and Ativan 2mg PO q6h, with escalation to IM formulation if patient refuses or a danger to herself or others.

## 2022-01-17 NOTE — ED BEHAVIORAL HEALTH ASSESSMENT NOTE - PRIVATE RESIDENCE
per collateral, patient "lives with a family who takes care of her and feeds her"; per patient's paper chart she lives in [facility] at [place]

## 2022-01-17 NOTE — ED BEHAVIORAL HEALTH ASSESSMENT NOTE - THOUGHT PROCESS
Overinclusive/Tangential/Perseverative/Flight of ideas/Illogical/Impaired reasoning/Other/Disorganized

## 2022-01-17 NOTE — ED BEHAVIORAL HEALTH ASSESSMENT NOTE - NSPRESENTSXS_PSY_ALL_CORE
Psychosis/Global insomnia/Severe anxiety, agitation or panic/Refusal or inability to complete safety plan

## 2022-01-17 NOTE — ED PROVIDER NOTE - PROGRESS NOTE DETAILS
BK: Psychiatry consulted. Patient became increasingly agitated and unable to de-escalate verbally.  Ativan 1 mg IM given, patient placed on monitor.  Patient with one-to-one in place Awake alert agitated again.  Psych reconsulted psychiatry at bedside Psychiatry to admit patient BK: Discussed case with psychiatry, will get hepatic function panel (called lab, Kathi, who received add on) prior to restarting depakote. Psych will put PRN agitation orders. ccruz- pt signed out to Dr [facility] pending IPP Patient signed out to me from Dr. Fontana. Pending bed: Inpatient psych. Patient currently calm maintained on one-to-one.

## 2022-01-18 PROCEDURE — 99232 SBSQ HOSP IP/OBS MODERATE 35: CPT

## 2022-01-18 PROCEDURE — 93010 ELECTROCARDIOGRAM REPORT: CPT

## 2022-01-18 RX ORDER — NICOTINE POLACRILEX 2 MG
4 GUM BUCCAL ONCE
Refills: 0 | Status: COMPLETED | OUTPATIENT
Start: 2022-01-18 | End: 2022-01-18

## 2022-01-18 RX ORDER — LEVOTHYROXINE SODIUM 125 MCG
50 TABLET ORAL DAILY
Refills: 0 | Status: DISCONTINUED | OUTPATIENT
Start: 2022-01-18 | End: 2022-01-18

## 2022-01-18 RX ORDER — NICOTINE POLACRILEX 2 MG
1 GUM BUCCAL DAILY
Refills: 0 | Status: DISCONTINUED | OUTPATIENT
Start: 2022-01-18 | End: 2022-02-03

## 2022-01-18 RX ORDER — LEVOTHYROXINE SODIUM 125 MCG
50 TABLET ORAL
Refills: 0 | Status: DISCONTINUED | OUTPATIENT
Start: 2022-01-19 | End: 2022-02-03

## 2022-01-18 RX ADMIN — FAMOTIDINE 20 MILLIGRAM(S): 10 INJECTION INTRAVENOUS at 20:08

## 2022-01-18 RX ADMIN — Medication 50 MILLIGRAM(S): at 02:47

## 2022-01-18 RX ADMIN — GABAPENTIN 300 MILLIGRAM(S): 400 CAPSULE ORAL at 20:08

## 2022-01-18 RX ADMIN — Medication 2 MILLIGRAM(S): at 20:35

## 2022-01-18 RX ADMIN — FAMOTIDINE 20 MILLIGRAM(S): 10 INJECTION INTRAVENOUS at 09:12

## 2022-01-18 RX ADMIN — Medication 2 MILLIGRAM(S): at 02:47

## 2022-01-18 RX ADMIN — Medication 2 MILLIGRAM(S): at 09:12

## 2022-01-18 RX ADMIN — HALOPERIDOL DECANOATE 5 MILLIGRAM(S): 100 INJECTION INTRAMUSCULAR at 13:07

## 2022-01-18 RX ADMIN — Medication 2 MILLIGRAM(S): at 23:31

## 2022-01-18 RX ADMIN — Medication 50 MICROGRAM(S): at 09:12

## 2022-01-18 RX ADMIN — HALOPERIDOL DECANOATE 5 MILLIGRAM(S): 100 INJECTION INTRAMUSCULAR at 20:35

## 2022-01-18 RX ADMIN — Medication 2 MILLIGRAM(S): at 10:46

## 2022-01-18 RX ADMIN — DIVALPROEX SODIUM 1000 MILLIGRAM(S): 500 TABLET, DELAYED RELEASE ORAL at 20:08

## 2022-01-18 RX ADMIN — Medication 4 MILLIGRAM(S): at 21:50

## 2022-01-18 NOTE — PATIENT PROFILE BEHAVIORAL HEALTH - FALL HARM RISK - UNIVERSAL INTERVENTIONS
Bed in lowest position, wheels locked, appropriate side rails in place/Call bell, personal items and telephone in reach/Instruct patient to call for assistance before getting out of bed or chair/Non-slip footwear when patient is out of bed/[place] to call system/Physically safe environment - no spills, clutter or unnecessary equipment/Purposeful Proactive Rounding/Room/bathroom lighting operational, light cord in reach

## 2022-01-18 NOTE — H&P ADULT - HISTORY OF PRESENT ILLNESS
36 year old single domiciled unemployed woman with past medical history of hypothyroidism and past psychiatric history of schizoaffective disorder bipolar type who was brought in by ambulance at the advice of her psychiatrist for increasingly erratic and manic behavior in the setting of medication non-compliance.

## 2022-01-18 NOTE — PROGRESS NOTE BEHAVIORAL HEALTH - NSBHFUPADDHPIFT_PSY_A_CORE
Ms. Blas is a 36 year old single domiciled unemployed woman with past medical history of hypothyroidism and past psychiatric history of schizoaffective disorder bipolar type who was brought in by ambulance at the advice of her psychiatrist for increasingly erratic and manic behavior in the setting of medication non-compliance. On multiple assessments, patient was acutely manic and largely unable to participate in interview.    On first approach, patient is asleep in bed and responsive to touch. She immediately starts talking about Vineet upon awakening, however content of speech is not intelligible. Patient demands multiple interpreters at various times (Guinean, New Zealander, Eritrean) but is noted to understand and speak English well. She is able to say that she's in [place] and that it is January 2022 but that she doesn't know the date. She identifies herself as "Ashlyn Blas, [place] Her speech is intermittently garbled and she provides an approximate phone number of her stepmother for collateral.    Stepmother Idania contacted, she is Guinean speaking and requested her  Jaquan speak in her place. He reports that the patient called them this morning saying that she's in the hospital. He reports that she sounded off, "not normal" and that this happens to her when she stops taking her medication, which she hasn't been taking in a while. She said odd things like "the President says hi to you". He reports that patient "lives with a family who takes care of her and feeds her" but is unable to provide further details of the arrangement. He reports that she hasn't been back to the house in a while and that's why she hasn't been taking her medications. He provides a contact number for the family as [phone]. Jaquan reports that patient's last known inpatient stay was 6 months ago when she went to [facility] near [facility] and called his home repeatedly and was put in the hospital. He notes that when patient is hospitalized and put back on her medication she responds well and appears completely normal. She is not , no children, and not employed.    On reassessment, patient is being escorted back to her room by security for attempting to elope. She is agitated and cursing at staff members. She spills water and coffee on the floor and is preoccupied with cleaning it up. She reports that she lives by herself "with a big doggie". She reports that her mother passed away at age 28 from an issue with her stomach and that her father passed away in 2006. She reports that her psychiatrist recently switched her to Zyprexa (?unclear)    Per [facility] paper chart, patient currently takes:  Haldol Decanoate  mg q 4 weeks, "last dose given 1/4/21" "next due 2/1/21"  Depakote ER 1000 mg PO at 9 PM  Gabapentin 300 mg PO at 9 PM  Benztropine 2 mg PO at 9 AM  Diphenhydramine 25 mg PO 9 PM  Synthroid 50 mcg PO 7 AM  Famotidine 20 mg PO 9 AM/PM (?unclear handwriting)

## 2022-01-18 NOTE — PROGRESS NOTE BEHAVIORAL HEALTH - NSBHFUPINTERVALHXFT_PSY_A_CORE
Pt seen and evaluated, chart reviewed.    Attempted to obtain collateral from pt's OP psychiatrist, Dr. Yan Guevara ([phone]) - left VM and callback # Pt seen and evaluated, chart reviewed. On evaluation, pt presents disheveled and labile, easily agitated. She repeatedly states she was lied to and she was supposed to go to [place]. When attempting to evaluate on events leading to admission, pt repeatedly states "everything is there" and is difficult to redirect back to evaluation questions. She is unable to state her medications, then reports being distressed that she needs her thyroid medications and letting this writer know that she has been taking her depakote. She states she is "fine" and wants to go home, denies changes to her mood despite observed mood lability, often fluctuating between crying and irritated. She reports she is sleeping and eating well. She denies AVH, paranoia. Denies SI/HI, intent and plan. When attempting to evaluate substance use, pt dismissive and becomes preservative on this writer outreaching to her numerous collaterals, unable to be redirected. She gives permission to outreach to her  Obdulio ([phone]); as per collateral, he is her friend and not , and states she has been taking her medications; and to her stepmother Grace ([phone]) - left VM and callback #.    Attempted to obtain collateral from pt's OP psychiatrist, Dr. Yan Guevara ([phone]) - left VM and callback # Pt seen and evaluated, chart reviewed. On evaluation, pt presents disheveled and labile, easily agitated. She repeatedly states she was lied to and she was supposed to go to [place]. When attempting to evaluate on events leading to admission, pt repeatedly states "everything is there" and is difficult to redirect back to evaluation questions. She is unable to state her medications, then reports being distressed that she needs her thyroid medications and letting this writer know that she has been taking her depakote. She states she is "fine" and wants to go home, denies changes to her mood despite observed mood lability, often fluctuating between crying and irritated. She reports she is sleeping and eating well. She denies AVH, paranoia. Denies SI/HI, intent and plan. When attempting to evaluate substance use, pt dismissive and becomes preservative on this writer outreaching to her numerous collaterals, unable to be redirected.     Pt gives permission to outreach to her ? Don ([phone]); as per collateral, he is her friend and not , and states she has been taking her medications; and to her stepmother Grace ([phone]) - left VM and callback #    Attempted to obtain collateral from pt's OP psychiatrist, Dr. Yan Guevara ([phone]) - left VM and callback # Pt seen and evaluated, chart reviewed. On evaluation, pt presents disheveled and labile, easily agitated. She repeatedly states she was lied to and she was supposed to go to [place]. When attempting to evaluate on events leading to admission, pt repeatedly states "everything is there" and is difficult to redirect back to evaluation questions. She is unable to state her medications, then reports being distressed that she needs her thyroid medications and letting this writer know that she has been taking her depakote. She states she is "fine" and wants to go home, denies changes to her mood despite observed mood lability, often fluctuating between crying and irritated. She reports she is sleeping and eating well. She denies AVH, paranoia. Denies SI/HI, intent and plan. When attempting to evaluate substance use, pt dismissive and becomes preservative on this writer outreaching to her numerous collaterals, unable to be redirected.     Pt gives permission to outreach to her ? Obdulio ([phone]); as per collateral, he is her friend and not , and states she has been taking her medications; and to her stepmother Grace ([phone]) - left VM and callback #    Spoke with pt's OP psychiatrist, Dr. Yan Guevara ([phone]) - confirms pt's medications, states pt has been on current regimen for years with no recent changes, reports when she is adherent to her medication regimen, pt does well. He states 2 weeks ago pt left her  [facility] 2 weeks ago to move in with a new man she has met who she said she is going to  (Obdulio). States pt likely stopped medications at that time which led to pt eviction and return to her  the day of admission. He reports pt with h/o similar episodes, most recently in 7-8/2021, states pt with dreams with getting , getting her citizenship and starting a family. He reports when pt decompensates, pt becomes manic, grandiose and sexually preoccupied. Reports pt to return to [facility] ([phone]) prior to returning to [facility] ([place], [phone]). Pending fax with further hx. Pt seen and evaluated, chart reviewed. On evaluation, pt presents disheveled and labile, easily agitated. Of note, pt offered use of [employer] , she refused at this time. She repeatedly states she was lied to and she was supposed to go to [place]. When attempting to evaluate on events leading to admission, pt repeatedly states "everything is there" and is difficult to redirect back to evaluation questions. She is unable to state her medications, then reports being distressed that she needs her thyroid medications and letting this writer know that she has been taking her depakote. She states she is "fine" and wants to go home, denies changes to her mood despite observed mood lability, often fluctuating between crying and irritated. She reports she is sleeping and eating well. She denies AVH, paranoia. Denies SI/HI, intent and plan. When attempting to evaluate substance use, pt dismissive and becomes preservative on this writer outreaching to her numerous collaterals, unable to be redirected.     Pt gives permission to outreach to her ? Obdulio ([phone]); as per collateral, he is her friend and not , and states she has been taking her medications; and to her stepmother Grace ([phone]) - left VM and callback #    Spoke with pt's OP psychiatrist, Dr. Yan Guevara ([phone]) - confirms pt's medications, states pt has been on current regimen for years with no recent changes, reports when she is adherent to her medication regimen, pt does well. He states 2 weeks ago pt left her  [facility] 2 weeks ago to move in with a new man she has met who she said she is going to  (Obdulio). States pt likely stopped medications at that time which led to pt eviction and return to her  the day of admission. He reports pt with h/o similar episodes, most recently in 7-8/2021, states pt with dreams with getting , getting her citizenship and starting a family. He reports when pt decompensates, pt becomes manic, grandiose and sexually preoccupied. Reports pt to return to [facility] ([phone]) prior to returning to [facility] ([place], [phone]). Pending fax with further hx.

## 2022-01-18 NOTE — PROGRESS NOTE BEHAVIORAL HEALTH - NSBHCHARTREVIEWVS_PSY_A_CORE FT
Vital Signs Last 24 Hrs  T(C): 36.3 (18 Jan 2022 02:20), Max: 36.3 (18 Jan 2022 02:20)  T(F): 97.3 (18 Jan 2022 02:20), Max: 97.3 (18 Jan 2022 02:20)  HR: 100 (18 Jan 2022 02:20) (95 - 100)  BP: 119/72 (18 Jan 2022 02:20) (119/72 - 141/91)  BP(mean): --  RR: 18 (18 Jan 2022 02:20) (15 - 18)  SpO2: 99% (18 Jan 2022 02:20) (97% - 99%)

## 2022-01-18 NOTE — PROGRESS NOTE BEHAVIORAL HEALTH - SUMMARY
35 y/o single domiciled unemployed woman with past medical history of hypothyroidism and past psychiatric history of schizoaffective disorder bipolar type who was brought in by ambulance at the advice of her psychiatrist for increasingly erratic and manic behavior in the setting of possible medication non-compliance, found to be acutely manic and disorganized. Her presentation appears consistent with decompensation of schizoaffective d/o bipolar type in setting of possible medication nonadherence.     #Schizoaffective d/o, bipolar type  -c/w depakote er 1000 mg qhs  -c/w haldol dec 150 mg q 4 weeks ("last dose given 1/4/21" "next due 2/1/21", pending clarification)  -c/w gabapentin 300 mg qhs  -c/w benztropine 2 mg daily    #Hypothyroidism  -c/w synthroid 50 mcg daily    #Agitation  -for agitation not amenable to verbal redirection, may give haldol 5 mg q6h prn with escalation to IM if pt is a danger to self or/and others with repeat EKG to ensure QTc <500 ms

## 2022-01-18 NOTE — CHART NOTE - NSCHARTNOTEFT_GEN_A_CORE
Social Work Admit Note:    Patient is 36 years of age female who was admitted for evaluation at request of her outpatient treatment team. Patient has a mental health diagnosis with multiple prior admissions. Patient is a resident of [facility] and a patient of Dr Hodges. Patient is currently manic and unable to meaningfully participate in intake assessment. Patient is labile delusional intrusive agitated aggressive with flight of ideas and pressured speech. Patient is difficult to understand and difficult to redirect. Patient admitted to unit emergency status for treatment safety and observation. Patient sates she [place] and was tricked into coming here.     Sexual History – patient identifies as heterosexual. 	  Family relationships and history – patient is single no dependants.    Leisure Activity Assessment – BEKAH.       Community Supports – [facility].   Employment – patient is not employed at this time.   Substance Use Assessment – BEKAH.     History of suicidality or self- injurious behaviors – BEKAH.      Significant Loses – BEKAH.    Life Goals – BEKAH.       will continue to meet with patient 1:1 and with treatment team daily.      Discharge plan is for continued mental health treatment in outpatient setting.      Please refer to Social Work Psychosocial for additional information.

## 2022-01-18 NOTE — PROGRESS NOTE BEHAVIORAL HEALTH - NSBHADDHXPSYCHFT_PSY_A_CORE
As per chart review, Patient with multiple past IPP admissions per collateral, has been with psychiatrist Dr. Guevara for 10-12 years. Diagnosed with schizoaffective disorder bipolar type

## 2022-01-18 NOTE — H&P ADULT - ASSESSMENT
36 year old single domiciled unemployed woman with past medical history of hypothyroidism and past psychiatric history of schizoaffective disorder bipolar type who was brought in by ambulance at the advice of her psychiatrist for increasingly erratic and manic behavior in the setting of medication non-compliance.       36 year old single domiciled unemployed woman with past medical history of hypothyroidism and past psychiatric history of schizoaffective disorder bipolar type who was brought in by ambulance at the advice of her psychiatrist for increasingly erratic and manic behavior in the setting of medication non-compliance.      # Schizoaffective DO  - follow recommendations of psychiatrist

## 2022-01-18 NOTE — PROGRESS NOTE BEHAVIORAL HEALTH - NSBHADMITIPBHPROVFT_PSY_A_CORE
Attempted to obtain collateral from pt's OP psychiatrist, Dr. Yan Guevara ([phone]) - left VM and callback #

## 2022-01-19 PROCEDURE — 99231 SBSQ HOSP IP/OBS SF/LOW 25: CPT

## 2022-01-19 RX ORDER — ACETAMINOPHEN 500 MG
650 TABLET ORAL EVERY 6 HOURS
Refills: 0 | Status: DISCONTINUED | OUTPATIENT
Start: 2022-01-19 | End: 2022-02-03

## 2022-01-19 RX ORDER — DIVALPROEX SODIUM 500 MG/1
500 TABLET, DELAYED RELEASE ORAL ONCE
Refills: 0 | Status: COMPLETED | OUTPATIENT
Start: 2022-01-19 | End: 2022-01-19

## 2022-01-19 RX ORDER — DIVALPROEX SODIUM 500 MG/1
1250 TABLET, DELAYED RELEASE ORAL AT BEDTIME
Refills: 0 | Status: COMPLETED | OUTPATIENT
Start: 2022-01-19 | End: 2022-01-19

## 2022-01-19 RX ORDER — DIVALPROEX SODIUM 500 MG/1
750 TABLET, DELAYED RELEASE ORAL
Refills: 0 | Status: DISCONTINUED | OUTPATIENT
Start: 2022-01-20 | End: 2022-01-24

## 2022-01-19 RX ORDER — BACITRACIN ZINC 500 UNIT/G
1 OINTMENT IN PACKET (EA) TOPICAL
Refills: 0 | Status: DISCONTINUED | OUTPATIENT
Start: 2022-01-19 | End: 2022-02-03

## 2022-01-19 RX ADMIN — Medication 1 PATCH: at 19:25

## 2022-01-19 RX ADMIN — Medication 50 MICROGRAM(S): at 06:08

## 2022-01-19 RX ADMIN — HALOPERIDOL DECANOATE 5 MILLIGRAM(S): 100 INJECTION INTRAMUSCULAR at 17:30

## 2022-01-19 RX ADMIN — FAMOTIDINE 20 MILLIGRAM(S): 10 INJECTION INTRAVENOUS at 20:21

## 2022-01-19 RX ADMIN — Medication 650 MILLIGRAM(S): at 06:28

## 2022-01-19 RX ADMIN — FAMOTIDINE 20 MILLIGRAM(S): 10 INJECTION INTRAVENOUS at 08:32

## 2022-01-19 RX ADMIN — GABAPENTIN 300 MILLIGRAM(S): 400 CAPSULE ORAL at 20:21

## 2022-01-19 RX ADMIN — HALOPERIDOL DECANOATE 5 MILLIGRAM(S): 100 INJECTION INTRAMUSCULAR at 08:57

## 2022-01-19 RX ADMIN — Medication 2 MILLIGRAM(S): at 08:57

## 2022-01-19 RX ADMIN — Medication 1 APPLICATION(S): at 20:21

## 2022-01-19 RX ADMIN — DIVALPROEX SODIUM 1250 MILLIGRAM(S): 500 TABLET, DELAYED RELEASE ORAL at 20:22

## 2022-01-19 RX ADMIN — DIVALPROEX SODIUM 500 MILLIGRAM(S): 500 TABLET, DELAYED RELEASE ORAL at 13:43

## 2022-01-19 RX ADMIN — Medication 650 MILLIGRAM(S): at 07:00

## 2022-01-19 RX ADMIN — Medication 1 PATCH: at 08:32

## 2022-01-19 RX ADMIN — Medication 650 MILLIGRAM(S): at 16:29

## 2022-01-19 RX ADMIN — Medication 2 MILLIGRAM(S): at 08:32

## 2022-01-19 RX ADMIN — Medication 2 MILLIGRAM(S): at 17:30

## 2022-01-19 NOTE — PROGRESS NOTE BEHAVIORAL HEALTH - NSBHCHARTREVIEWVS_PSY_A_CORE FT
Vital Signs Last 24 Hrs  T(C): 36.4 (18 Jan 2022 15:43), Max: 36.4 (18 Jan 2022 15:43)  T(F): 97.6 (18 Jan 2022 15:43), Max: 97.6 (18 Jan 2022 15:43)  HR: 976 (18 Jan 2022 15:43) (976 - 976)  BP: 91/49 (18 Jan 2022 15:43) (91/49 - 91/49)  BP(mean): --  RR: 16 (18 Jan 2022 15:43) (16 - 16)  SpO2: -- Vital Signs Last 24 Hrs  T(C): 36.1 (19 Jan 2022 10:40), Max: 36.1 (19 Jan 2022 10:40)  T(F): 96.9 (19 Jan 2022 10:40), Max: 96.9 (19 Jan 2022 10:40)  HR: 89 (19 Jan 2022 10:40) (89 - 89)  BP: 128/70 (19 Jan 2022 10:40) (128/70 - 128/70)  BP(mean): --  RR: 19 (19 Jan 2022 10:40) (19 - 19)  SpO2: --

## 2022-01-19 NOTE — PROGRESS NOTE BEHAVIORAL HEALTH - SUMMARY
37 y/o single domiciled unemployed woman with past medical history of hypothyroidism and past psychiatric history of schizoaffective disorder bipolar type who was brought in by ambulance at the advice of her psychiatrist for increasingly erratic and manic behavior in the setting of possible medication non-compliance, found to be acutely manic and disorganized. Her presentation appears consistent with decompensation of schizoaffective d/o bipolar type in setting of possible medication nonadherence. Pt will likely benefit from IPP at this time.    #Schizoaffective d/o, bipolar type  -c/w depakote er 1000 mg qhs, f/u vpa level on 1/21  -c/w haldol dec 150 mg q 4 weeks ("last dose given 1/4/21" "next due 2/1/21", pending clarification)  -c/w gabapentin 300 mg qhs  -c/w benztropine 2 mg daily    #Hypothyroidism  -c/w synthroid 50 mcg daily    #Agitation  -for agitation not amenable to verbal redirection, may give haldol 5 mg q6h prn with escalation to IM if pt is a danger to self or/and others with repeat EKG to ensure QTc <500 ms 37 y/o single domiciled unemployed woman with past medical history of hypothyroidism and past psychiatric history of schizoaffective disorder bipolar type who was brought in by ambulance at the advice of her psychiatrist for increasingly erratic and manic behavior in the setting of possible medication non-compliance, found to be acutely manic and disorganized. Her presentation appears consistent with decompensation of schizoaffective d/o bipolar type in setting of possible medication nonadherence. Pt will likely benefit from IPP at this time.    #Schizoaffective d/o, bipolar type  -c/w depakote er 1000 mg qhs, f/u vpa level on 1/21 --> dc  -load depakote dr 500 mg once / 1250 mg qHS once (1/19), start depakote dr 750 mg bid (1/20), f/u vpa level in 5 days  -c/w haldol dec 150 mg q 4 weeks ("last dose given 1/4/21" "next due 2/1/21", pending clarification)  -c/w gabapentin 300 mg qhs  -c/w benztropine 2 mg daily    #Hypothyroidism  -c/w synthroid 50 mcg daily    #Agitation  -for agitation not amenable to verbal redirection, may give haldol 5 mg q6h prn with escalation to IM if pt is a danger to self or/and others with repeat EKG to ensure QTc <500 ms
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
## 2022-01-20 LAB — SARS-COV-2 RNA SPEC QL NAA+PROBE: SIGNIFICANT CHANGE UP

## 2022-01-20 PROCEDURE — 99231 SBSQ HOSP IP/OBS SF/LOW 25: CPT

## 2022-01-20 RX ORDER — NICOTINE POLACRILEX 2 MG
2 GUM BUCCAL
Refills: 0 | Status: DISCONTINUED | OUTPATIENT
Start: 2022-01-20 | End: 2022-02-03

## 2022-01-20 RX ADMIN — Medication 1 PATCH: at 05:48

## 2022-01-20 RX ADMIN — FAMOTIDINE 20 MILLIGRAM(S): 10 INJECTION INTRAVENOUS at 20:02

## 2022-01-20 RX ADMIN — Medication 1 PATCH: at 06:00

## 2022-01-20 RX ADMIN — Medication 1 APPLICATION(S): at 08:41

## 2022-01-20 RX ADMIN — Medication 50 MILLIGRAM(S): at 20:02

## 2022-01-20 RX ADMIN — Medication 2 MILLIGRAM(S): at 08:39

## 2022-01-20 RX ADMIN — DIVALPROEX SODIUM 750 MILLIGRAM(S): 500 TABLET, DELAYED RELEASE ORAL at 08:41

## 2022-01-20 RX ADMIN — GABAPENTIN 300 MILLIGRAM(S): 400 CAPSULE ORAL at 20:02

## 2022-01-20 RX ADMIN — Medication 1 PATCH: at 19:23

## 2022-01-20 RX ADMIN — Medication 50 MICROGRAM(S): at 05:30

## 2022-01-20 RX ADMIN — Medication 2 MILLIGRAM(S): at 02:13

## 2022-01-20 RX ADMIN — HALOPERIDOL DECANOATE 5 MILLIGRAM(S): 100 INJECTION INTRAMUSCULAR at 02:13

## 2022-01-20 RX ADMIN — FAMOTIDINE 20 MILLIGRAM(S): 10 INJECTION INTRAVENOUS at 08:39

## 2022-01-20 RX ADMIN — Medication 2 MILLIGRAM(S): at 16:15

## 2022-01-20 RX ADMIN — Medication 1 APPLICATION(S): at 20:03

## 2022-01-20 RX ADMIN — DIVALPROEX SODIUM 750 MILLIGRAM(S): 500 TABLET, DELAYED RELEASE ORAL at 20:03

## 2022-01-20 RX ADMIN — HALOPERIDOL DECANOATE 5 MILLIGRAM(S): 100 INJECTION INTRAMUSCULAR at 16:36

## 2022-01-20 NOTE — PROGRESS NOTE BEHAVIORAL HEALTH - NSBHCHARTREVIEWVS_PSY_A_CORE FT
Vital Signs Last 24 Hrs  T(C): 36 (20 Jan 2022 10:18), Max: 36.4 (20 Jan 2022 06:29)  T(F): 96.8 (20 Jan 2022 10:18), Max: 97.6 (20 Jan 2022 06:29)  HR: 107 (20 Jan 2022 10:18) (104 - 107)  BP: 144/90 (20 Jan 2022 10:18) (114/87 - 144/90)  BP(mean): --  RR: 18 (20 Jan 2022 10:18) (18 - 18)  SpO2: --

## 2022-01-20 NOTE — PROGRESS NOTE BEHAVIORAL HEALTH - SUMMARY
35 y/o single domiciled unemployed woman with past medical history of hypothyroidism and past psychiatric history of schizoaffective disorder bipolar type who was brought in by ambulance at the advice of her psychiatrist for increasingly erratic and manic behavior in the setting of possible medication non-compliance, found to be acutely manic and disorganized. Her presentation appears consistent with decompensation of schizoaffective d/o bipolar type in setting of possible medication nonadherence. Pt will likely benefit from IPP at this time.    #Schizoaffective d/o, bipolar type  -c/w depakote er 1000 mg qhs, f/u vpa level on 1/21 --> dc  -load depakote dr 500 mg once / 1250 mg qHS once (1/19), start depakote dr 750 mg bid (1/20), f/u vpa level in 5 days  -c/w haldol dec 150 mg q 4 weeks (last dose given 1/4/22, next due 2/1/22, confirmed)  -c/w gabapentin 300 mg qhs  -c/w benztropine 2 mg daily    #Hypothyroidism  -c/w synthroid 50 mcg daily    #Agitation  -for agitation not amenable to verbal redirection, may give haldol 5 mg q6h prn with escalation to IM if pt is a danger to self or/and others with repeat EKG to ensure QTc <500 ms

## 2022-01-20 NOTE — PROGRESS NOTE BEHAVIORAL HEALTH - NSBHFUPINTERVALHXFT_PSY_A_CORE
Pt seen and evaluated, chart reviewed. As per nursing report, pt became agitated overnight d/t dispute with roommate, PRN haldol/ativan given with fair results. On evaluation, pt presents with improved grooming, continues to be elevated and labile however more verbally redirectable. She continues to deny all psychiatric complaints, reports good mood, sleep and appetite, denies AVH and paranoia, denies SI/HI, intent and plan. When evaluating on events overnight, pt states she wasn't able to sleep and became agitated because her roommate was yelling at her; of note, pt's room changed. 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 Pt adherent to medications, denies negative side effects. Pt visible on unit with improved behavioral control.  Pt denies s/sx of Covid19.

## 2022-01-21 LAB
A1C WITH ESTIMATED AVERAGE GLUCOSE RESULT: 5 % — SIGNIFICANT CHANGE UP (ref 4–5.6)
ALBUMIN SERPL ELPH-MCNC: 4.4 G/DL — SIGNIFICANT CHANGE UP (ref 3.5–5.2)
ALP SERPL-CCNC: 60 U/L — SIGNIFICANT CHANGE UP (ref 30–115)
ALT FLD-CCNC: 16 U/L — SIGNIFICANT CHANGE UP (ref 0–41)
AST SERPL-CCNC: 20 U/L — SIGNIFICANT CHANGE UP (ref 0–41)
BILIRUB DIRECT SERPL-MCNC: <0.2 MG/DL — SIGNIFICANT CHANGE UP (ref 0–0.3)
BILIRUB INDIRECT FLD-MCNC: >0.1 MG/DL — LOW (ref 0.2–1.2)
BILIRUB SERPL-MCNC: 0.3 MG/DL — SIGNIFICANT CHANGE UP (ref 0.2–1.2)
CHOLEST SERPL-MCNC: 153 MG/DL — SIGNIFICANT CHANGE UP
ESTIMATED AVERAGE GLUCOSE: 97 MG/DL — SIGNIFICANT CHANGE UP (ref 68–114)
HDLC SERPL-MCNC: 45 MG/DL — LOW
LIPID PNL WITH DIRECT LDL SERPL: 93 MG/DL — SIGNIFICANT CHANGE UP
NON HDL CHOLESTEROL: 108 MG/DL — SIGNIFICANT CHANGE UP
PROT SERPL-MCNC: 7.1 G/DL — SIGNIFICANT CHANGE UP (ref 6–8)
TRIGL SERPL-MCNC: 138 MG/DL — SIGNIFICANT CHANGE UP
TSH SERPL-MCNC: 0.85 UIU/ML — SIGNIFICANT CHANGE UP (ref 0.27–4.2)
VALPROATE SERPL-MCNC: 72 UG/ML — SIGNIFICANT CHANGE UP (ref 50–100)

## 2022-01-21 PROCEDURE — 99231 SBSQ HOSP IP/OBS SF/LOW 25: CPT

## 2022-01-21 RX ADMIN — Medication 2 MILLIGRAM(S): at 20:19

## 2022-01-21 RX ADMIN — Medication 50 MILLIGRAM(S): at 12:31

## 2022-01-21 RX ADMIN — DIVALPROEX SODIUM 750 MILLIGRAM(S): 500 TABLET, DELAYED RELEASE ORAL at 08:05

## 2022-01-21 RX ADMIN — GABAPENTIN 300 MILLIGRAM(S): 400 CAPSULE ORAL at 21:52

## 2022-01-21 RX ADMIN — Medication 2 MILLIGRAM(S): at 06:50

## 2022-01-21 RX ADMIN — Medication 1 PATCH: at 09:01

## 2022-01-21 RX ADMIN — Medication 2 MILLIGRAM(S): at 08:06

## 2022-01-21 RX ADMIN — Medication 2 MILLIGRAM(S): at 06:37

## 2022-01-21 RX ADMIN — Medication 50 MILLIGRAM(S): at 21:51

## 2022-01-21 RX ADMIN — DIVALPROEX SODIUM 750 MILLIGRAM(S): 500 TABLET, DELAYED RELEASE ORAL at 21:50

## 2022-01-21 RX ADMIN — Medication 2 MILLIGRAM(S): at 16:44

## 2022-01-21 RX ADMIN — Medication 2 MILLIGRAM(S): at 22:24

## 2022-01-21 RX ADMIN — FAMOTIDINE 20 MILLIGRAM(S): 10 INJECTION INTRAVENOUS at 21:50

## 2022-01-21 RX ADMIN — Medication 1 APPLICATION(S): at 08:59

## 2022-01-21 RX ADMIN — FAMOTIDINE 20 MILLIGRAM(S): 10 INJECTION INTRAVENOUS at 08:06

## 2022-01-21 RX ADMIN — HALOPERIDOL DECANOATE 5 MILLIGRAM(S): 100 INJECTION INTRAMUSCULAR at 06:50

## 2022-01-21 RX ADMIN — Medication 650 MILLIGRAM(S): at 16:53

## 2022-01-21 RX ADMIN — Medication 50 MICROGRAM(S): at 06:25

## 2022-01-21 RX ADMIN — Medication 2 MILLIGRAM(S): at 16:48

## 2022-01-21 RX ADMIN — HALOPERIDOL DECANOATE 5 MILLIGRAM(S): 100 INJECTION INTRAMUSCULAR at 22:28

## 2022-01-21 RX ADMIN — Medication 650 MILLIGRAM(S): at 17:39

## 2022-01-21 NOTE — PROGRESS NOTE BEHAVIORAL HEALTH - NSBHCHARTREVIEWVS_PSY_A_CORE FT
Vital Signs Last 24 Hrs  T(C): 35.9 (21 Jan 2022 08:35), Max: 37.1 (20 Jan 2022 15:46)  T(F): 96.7 (21 Jan 2022 08:35), Max: 98.7 (20 Jan 2022 15:46)  HR: 108 (21 Jan 2022 08:35) (96 - 108)  BP: 133/73 (21 Jan 2022 08:35) (133/73 - 144/82)  BP(mean): --  RR: 16 (21 Jan 2022 08:35) (16 - 18)  SpO2: --

## 2022-01-21 NOTE — BH SAFETY PLAN - LOCAL URGENT CARE ADDRESS
[facility] is located at [address] [place] [address] (inside the [facility]).

## 2022-01-21 NOTE — PROGRESS NOTE BEHAVIORAL HEALTH - SUMMARY
35 y/o single domiciled unemployed woman with past medical history of hypothyroidism and past psychiatric history of schizoaffective disorder bipolar type who was brought in by ambulance at the advice of her psychiatrist for increasingly erratic and manic behavior in the setting of possible medication non-compliance, found to be acutely manic and disorganized. Her presentation appears consistent with decompensation of schizoaffective d/o bipolar type in setting of possible medication nonadherence. Pt will likely benefit from IPP at this time.    #Schizoaffective d/o, bipolar type  -c/w depakote er 1000 mg qhs, f/u vpa level on 1/21 --> dc  -load depakote dr 500 mg once / 1250 mg qHS once (1/19), start depakote dr 750 mg bid (1/20), f/u vpa level on 1/23  -c/w haldol dec 150 mg q 4 weeks (last dose given 1/4/22, next due 2/1/22, confirmed)  -c/w gabapentin 300 mg qhs  -c/w benztropine 2 mg daily    #Hypothyroidism  -c/w synthroid 50 mcg daily    #Agitation  -for agitation not amenable to verbal redirection, may give haldol 5 mg q6h prn with escalation to IM if pt is a danger to self or/and others with repeat EKG to ensure QTc <500 ms

## 2022-01-21 NOTE — BH SAFETY PLAN - THE ONE THING THAT IS MOST IMPORTANT TO ME AND WORTH LIVING FOR IS:
The one thing that is most important ome and worth living for would be to meet Biden and Obama. The one thing that is most important me and worth living for would be to meet Biden and Obama.
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
## 2022-01-22 PROCEDURE — 99232 SBSQ HOSP IP/OBS MODERATE 35: CPT

## 2022-01-22 RX ORDER — DIPHENHYDRAMINE HCL 50 MG
50 CAPSULE ORAL ONCE
Refills: 0 | Status: COMPLETED | OUTPATIENT
Start: 2022-01-22 | End: 2022-01-22

## 2022-01-22 RX ORDER — HALOPERIDOL DECANOATE 100 MG/ML
5 INJECTION INTRAMUSCULAR ONCE
Refills: 0 | Status: COMPLETED | OUTPATIENT
Start: 2022-01-22 | End: 2022-01-22

## 2022-01-22 RX ORDER — HYDROXYZINE HCL 10 MG
100 TABLET ORAL ONCE
Refills: 0 | Status: COMPLETED | OUTPATIENT
Start: 2022-01-22 | End: 2022-01-22

## 2022-01-22 RX ADMIN — Medication 2 MILLIGRAM(S): at 22:13

## 2022-01-22 RX ADMIN — GABAPENTIN 300 MILLIGRAM(S): 400 CAPSULE ORAL at 20:06

## 2022-01-22 RX ADMIN — Medication 1 PATCH: at 08:44

## 2022-01-22 RX ADMIN — Medication 1 APPLICATION(S): at 08:41

## 2022-01-22 RX ADMIN — Medication 50 MILLIGRAM(S): at 10:08

## 2022-01-22 RX ADMIN — DIVALPROEX SODIUM 750 MILLIGRAM(S): 500 TABLET, DELAYED RELEASE ORAL at 08:41

## 2022-01-22 RX ADMIN — Medication 2 MILLIGRAM(S): at 13:26

## 2022-01-22 RX ADMIN — HALOPERIDOL DECANOATE 5 MILLIGRAM(S): 100 INJECTION INTRAMUSCULAR at 10:08

## 2022-01-22 RX ADMIN — Medication 2 MILLIGRAM(S): at 01:35

## 2022-01-22 RX ADMIN — Medication 1 APPLICATION(S): at 20:05

## 2022-01-22 RX ADMIN — Medication 1 PATCH: at 18:48

## 2022-01-22 RX ADMIN — Medication 2 MILLIGRAM(S): at 20:06

## 2022-01-22 RX ADMIN — FAMOTIDINE 20 MILLIGRAM(S): 10 INJECTION INTRAVENOUS at 08:41

## 2022-01-22 RX ADMIN — Medication 50 MICROGRAM(S): at 08:41

## 2022-01-22 RX ADMIN — FAMOTIDINE 20 MILLIGRAM(S): 10 INJECTION INTRAVENOUS at 20:06

## 2022-01-22 RX ADMIN — HALOPERIDOL DECANOATE 5 MILLIGRAM(S): 100 INJECTION INTRAMUSCULAR at 20:06

## 2022-01-22 RX ADMIN — Medication 1 PATCH: at 08:38

## 2022-01-22 RX ADMIN — DIVALPROEX SODIUM 750 MILLIGRAM(S): 500 TABLET, DELAYED RELEASE ORAL at 20:07

## 2022-01-22 RX ADMIN — Medication 2 MILLIGRAM(S): at 08:41

## 2022-01-22 RX ADMIN — Medication 100 MILLIGRAM(S): at 02:19

## 2022-01-22 RX ADMIN — Medication 2 MILLIGRAM(S): at 10:08

## 2022-01-22 NOTE — PROGRESS NOTE BEHAVIORAL HEALTH - NSBHCHARTREVIEWVS_PSY_A_CORE FT
Vital Signs Last 24 Hrs  T(C): 35.6 (22 Jan 2022 09:32), Max: 36.4 (21 Jan 2022 15:36)  T(F): 96.1 (22 Jan 2022 09:32), Max: 97.6 (21 Jan 2022 15:36)  HR: 103 (22 Jan 2022 09:32) (103 - 108)  BP: 130/69 (22 Jan 2022 09:32) (121/70 - 130/69)  BP(mean): --  RR: 18 (22 Jan 2022 09:32) (17 - 18)  SpO2: --

## 2022-01-22 NOTE — PROGRESS NOTE BEHAVIORAL HEALTH - NSBHFUPINTERVALHXFT_PSY_A_CORE
Pt seen and evaluated, chart reviewed. As per nursing report, pt remains acutely manic, religiously preoccupied, but takes medications. This morning Ashlyn was very agitated, loud, screaming at staff in English and Wallisian, became threatening towards another pt and was medicated with Haldol 5mg, Ativan 2mg and Benadryl 50mg IM. Pt presents odd, floridly manic and grandious. She states that she will buy this hospital. She state she was admitted only because she is illegal immigrant and she has no mental illness. She talks a lot in disorganised way about her family, Scientology and other topics. She denies any complaints.

## 2022-01-23 LAB — VALPROATE SERPL-MCNC: 72 UG/ML — SIGNIFICANT CHANGE UP (ref 50–100)

## 2022-01-23 RX ADMIN — Medication 2 MILLIGRAM(S): at 05:52

## 2022-01-23 RX ADMIN — Medication 50 MILLIGRAM(S): at 11:18

## 2022-01-23 RX ADMIN — Medication 1 PATCH: at 07:36

## 2022-01-23 RX ADMIN — Medication 1 APPLICATION(S): at 08:29

## 2022-01-23 RX ADMIN — Medication 2 MILLIGRAM(S): at 20:30

## 2022-01-23 RX ADMIN — FAMOTIDINE 20 MILLIGRAM(S): 10 INJECTION INTRAVENOUS at 08:29

## 2022-01-23 RX ADMIN — Medication 1 APPLICATION(S): at 20:30

## 2022-01-23 RX ADMIN — Medication 50 MICROGRAM(S): at 05:52

## 2022-01-23 RX ADMIN — FAMOTIDINE 20 MILLIGRAM(S): 10 INJECTION INTRAVENOUS at 20:29

## 2022-01-23 RX ADMIN — Medication 2 MILLIGRAM(S): at 17:43

## 2022-01-23 RX ADMIN — Medication 1 PATCH: at 08:28

## 2022-01-23 RX ADMIN — Medication 2 MILLIGRAM(S): at 09:28

## 2022-01-23 RX ADMIN — HALOPERIDOL DECANOATE 5 MILLIGRAM(S): 100 INJECTION INTRAMUSCULAR at 11:18

## 2022-01-23 RX ADMIN — Medication 50 MILLIGRAM(S): at 23:05

## 2022-01-23 RX ADMIN — Medication 2 MILLIGRAM(S): at 12:30

## 2022-01-23 RX ADMIN — Medication 2 MILLIGRAM(S): at 08:29

## 2022-01-23 RX ADMIN — Medication 2 MILLIGRAM(S): at 22:52

## 2022-01-23 RX ADMIN — Medication 2 MILLIGRAM(S): at 22:45

## 2022-01-23 RX ADMIN — GABAPENTIN 300 MILLIGRAM(S): 400 CAPSULE ORAL at 20:29

## 2022-01-23 RX ADMIN — HALOPERIDOL DECANOATE 5 MILLIGRAM(S): 100 INJECTION INTRAMUSCULAR at 23:05

## 2022-01-23 RX ADMIN — DIVALPROEX SODIUM 750 MILLIGRAM(S): 500 TABLET, DELAYED RELEASE ORAL at 08:28

## 2022-01-23 RX ADMIN — DIVALPROEX SODIUM 750 MILLIGRAM(S): 500 TABLET, DELAYED RELEASE ORAL at 20:29

## 2022-01-23 RX ADMIN — Medication 1 PATCH: at 08:29

## 2022-01-24 LAB — SARS-COV-2 RNA SPEC QL NAA+PROBE: SIGNIFICANT CHANGE UP

## 2022-01-24 PROCEDURE — 99231 SBSQ HOSP IP/OBS SF/LOW 25: CPT

## 2022-01-24 RX ORDER — SENNA PLUS 8.6 MG/1
2 TABLET ORAL AT BEDTIME
Refills: 0 | Status: DISCONTINUED | OUTPATIENT
Start: 2022-01-24 | End: 2022-02-03

## 2022-01-24 RX ORDER — DIPHENHYDRAMINE HCL 50 MG
50 CAPSULE ORAL EVERY 6 HOURS
Refills: 0 | Status: DISCONTINUED | OUTPATIENT
Start: 2022-01-24 | End: 2022-01-24

## 2022-01-24 RX ORDER — DIVALPROEX SODIUM 500 MG/1
1250 TABLET, DELAYED RELEASE ORAL AT BEDTIME
Refills: 0 | Status: DISCONTINUED | OUTPATIENT
Start: 2022-01-24 | End: 2022-01-24

## 2022-01-24 RX ORDER — POLYETHYLENE GLYCOL 3350 17 G/17G
17 POWDER, FOR SOLUTION ORAL DAILY
Refills: 0 | Status: DISCONTINUED | OUTPATIENT
Start: 2022-01-24 | End: 2022-02-03

## 2022-01-24 RX ORDER — DIPHENHYDRAMINE HCL 50 MG
50 CAPSULE ORAL EVERY 6 HOURS
Refills: 0 | Status: DISCONTINUED | OUTPATIENT
Start: 2022-01-24 | End: 2022-02-03

## 2022-01-24 RX ORDER — DIVALPROEX SODIUM 500 MG/1
1000 TABLET, DELAYED RELEASE ORAL
Refills: 0 | Status: DISCONTINUED | OUTPATIENT
Start: 2022-01-24 | End: 2022-02-03

## 2022-01-24 RX ADMIN — DIVALPROEX SODIUM 750 MILLIGRAM(S): 500 TABLET, DELAYED RELEASE ORAL at 08:01

## 2022-01-24 RX ADMIN — FAMOTIDINE 20 MILLIGRAM(S): 10 INJECTION INTRAVENOUS at 20:33

## 2022-01-24 RX ADMIN — Medication 1 PATCH: at 08:04

## 2022-01-24 RX ADMIN — Medication 2 MILLIGRAM(S): at 08:02

## 2022-01-24 RX ADMIN — Medication 650 MILLIGRAM(S): at 14:40

## 2022-01-24 RX ADMIN — DIVALPROEX SODIUM 1000 MILLIGRAM(S): 500 TABLET, DELAYED RELEASE ORAL at 20:32

## 2022-01-24 RX ADMIN — GABAPENTIN 300 MILLIGRAM(S): 400 CAPSULE ORAL at 20:33

## 2022-01-24 RX ADMIN — Medication 1 PATCH: at 18:46

## 2022-01-24 RX ADMIN — SENNA PLUS 2 TABLET(S): 8.6 TABLET ORAL at 20:32

## 2022-01-24 RX ADMIN — Medication 50 MICROGRAM(S): at 06:08

## 2022-01-24 RX ADMIN — FAMOTIDINE 20 MILLIGRAM(S): 10 INJECTION INTRAVENOUS at 08:02

## 2022-01-24 RX ADMIN — Medication 50 MILLIGRAM(S): at 08:00

## 2022-01-24 RX ADMIN — Medication 1 APPLICATION(S): at 20:34

## 2022-01-24 RX ADMIN — Medication 2 MILLIGRAM(S): at 17:38

## 2022-01-24 RX ADMIN — Medication 1 APPLICATION(S): at 08:03

## 2022-01-24 RX ADMIN — Medication 1 PATCH: at 07:27

## 2022-01-24 RX ADMIN — Medication 2 MILLIGRAM(S): at 07:31

## 2022-01-24 RX ADMIN — Medication 2 MILLIGRAM(S): at 15:29

## 2022-01-24 RX ADMIN — Medication 2 MILLIGRAM(S): at 21:32

## 2022-01-24 RX ADMIN — Medication 50 MILLIGRAM(S): at 08:52

## 2022-01-24 RX ADMIN — Medication 2 MILLIGRAM(S): at 11:57

## 2022-01-24 RX ADMIN — Medication 650 MILLIGRAM(S): at 14:34

## 2022-01-24 NOTE — PROGRESS NOTE BEHAVIORAL HEALTH - NSBHCHARTREVIEWVS_PSY_A_CORE FT
Vital Signs Last 24 Hrs  T(C): 36 (24 Jan 2022 08:49), Max: 36 (24 Jan 2022 08:49)  T(F): 96.8 (24 Jan 2022 08:49), Max: 96.8 (24 Jan 2022 08:49)  HR: 111 (24 Jan 2022 08:49) (111 - 111)  BP: 176/77 (24 Jan 2022 08:49) (176/77 - 176/77)  BP(mean): --  RR: 18 (24 Jan 2022 08:49) (18 - 18)  SpO2: -- Vital Signs Last 24 Hrs  T(C): 36.4 (24 Jan 2022 15:41), Max: 36.4 (24 Jan 2022 15:41)  T(F): 97.5 (24 Jan 2022 15:41), Max: 97.5 (24 Jan 2022 15:41)  HR: 113 (24 Jan 2022 15:41) (111 - 113)  BP: 129/60 (24 Jan 2022 15:41) (129/60 - 176/77)  BP(mean): --  RR: 18 (24 Jan 2022 15:41) (18 - 18)  SpO2: --

## 2022-01-24 NOTE — PROGRESS NOTE BEHAVIORAL HEALTH - SUMMARY
37 y/o single domiciled unemployed woman with past medical history of hypothyroidism and past psychiatric history of schizoaffective disorder bipolar type who was brought in by ambulance at the advice of her psychiatrist for increasingly erratic and manic behavior in the setting of possible medication non-compliance, found to be acutely manic and disorganized. Her presentation appears consistent with decompensation of schizoaffective d/o bipolar type in setting of possible medication nonadherence. Pt will likely benefit from IPP at this time.    #Schizoaffective d/o, bipolar type  -c/w depakote er 1000 mg qhs, f/u vpa level on 1/21 --> dc  -load depakote dr 500 mg once / 1250 mg qHS once (1/19), start depakote dr 750 mg bid (1/20), f/u vpa level 72 (1/23) --> titrate depakote (1/24)  -c/w haldol dec 150 mg q 4 weeks (last dose given 1/4/22, next due 2/1/22, confirmed)  -c/w gabapentin 300 mg qhs  -c/w benztropine 2 mg daily    #Hypothyroidism  -c/w synthroid 50 mcg daily    #Agitation  -for agitation not amenable to verbal redirection, may give haldol 5 mg q6h prn with escalation to IM if pt is a danger to self or/and others with repeat EKG to ensure QTc <500 ms 35 y/o single domiciled unemployed woman with past medical history of hypothyroidism and past psychiatric history of schizoaffective disorder bipolar type who was brought in by ambulance at the advice of her psychiatrist for increasingly erratic and manic behavior in the setting of possible medication non-compliance, found to be acutely manic and disorganized. Her presentation appears consistent with decompensation of schizoaffective d/o bipolar type in setting of possible medication nonadherence. Pt will likely benefit from IPP at this time.    #Schizoaffective d/o, bipolar type  -c/w depakote er 1000 mg qhs, f/u vpa level on 1/21 --> dc  -load depakote dr 500 mg once / 1250 mg qHS once (1/19), start depakote dr 750 mg bid (1/20), f/u vpa level 72 (1/23) --> titrate depakote 500 mg qAM / 1500 mg qHS (1/24), f/u vpa level on 1/27  -c/w haldol dec 150 mg q 4 weeks (last dose given 1/4/22, next due 2/1/22, confirmed)  -c/w gabapentin 300 mg qhs  -c/w benztropine 2 mg daily    #Hypothyroidism  -c/w synthroid 50 mcg daily    #Agitation  -for agitation not amenable to verbal redirection, may give haldol 5 mg q6h prn with escalation to IM if pt is a danger to self or/and others with repeat EKG to ensure QTc <500 ms 35 y/o single domiciled unemployed woman with past medical history of hypothyroidism and past psychiatric history of schizoaffective disorder bipolar type who was brought in by ambulance at the advice of her psychiatrist for increasingly erratic and manic behavior in the setting of possible medication non-compliance, found to be acutely manic and disorganized. Her presentation appears consistent with decompensation of schizoaffective d/o bipolar type in setting of possible medication nonadherence. Pt will likely benefit from IPP at this time.    #Schizoaffective d/o, bipolar type  -c/w depakote er 1000 mg qhs, f/u vpa level on 1/21 --> dc  -load depakote dr 500 mg once / 1250 mg qHS once (1/19), start depakote dr 750 mg bid (1/20), f/u vpa level 72 (1/23) --> titrate depakote 1000 mg bid (1/24), f/u vpa level in 4 days  -c/w haldol dec 150 mg q 4 weeks (last dose given 1/4/22, next due 2/1/22, confirmed)  -c/w gabapentin 300 mg qhs  -c/w benztropine 2 mg daily    #Hypothyroidism  -c/w synthroid 50 mcg daily    #Agitation  -for agitation not amenable to verbal redirection, may give haldol 5 mg q6h prn with escalation to IM if pt is a danger to self or/and others with repeat EKG to ensure QTc <500 ms 35 y/o single domiciled unemployed woman with past medical history of hypothyroidism and past psychiatric history of schizoaffective disorder bipolar type who was brought in by ambulance at the advice of her psychiatrist for increasingly erratic and manic behavior in the setting of possible medication non-compliance, found to be acutely manic and disorganized. Her presentation appears consistent with decompensation of schizoaffective d/o bipolar type in setting of possible medication nonadherence. Pt will likely benefit from IPP at this time.    #Schizoaffective d/o, bipolar type  -c/w depakote er 1000 mg qhs, f/u vpa level on 1/21 --> dc  -load depakote dr 500 mg once / 1250 mg qHS once (1/19), start depakote dr 750 mg bid (1/20), f/u vpa level 72 (1/23) --> titrate depakote 1000 mg bid (1/24), f/u vpa level in 4 days  -c/w haldol dec 150 mg q 4 weeks (last dose given 1/4/22, next due 2/1/22, confirmed)  -c/w gabapentin 300 mg qhs  -c/w benztropine 2 mg daily    #Hypothyroidism  -c/w synthroid 50 mcg daily    #Constipation  -start senna 2 mg qhs  -start miralax 17g daily prn    #Agitation  -for agitation not amenable to verbal redirection, may give haldol 5 mg q6h prn with escalation to IM if pt is a danger to self or/and others with repeat EKG to ensure QTc <500 ms

## 2022-01-24 NOTE — CHART NOTE - NSCHARTNOTEFT_GEN_A_CORE
Patient remains on unit for continued treatment safety and observation. Patient is visible on unit and compliant with treatment. Patient is manic intrusive preservative and grandiose. Patient has a flight of ideas is tangential and labile. Patient is focused on discharge to [facility] . Patient requires staff redirection and intervention. Patient denies suicidal ideation, homicidal ideation and presents with no evidence of audio visual hallucinations. Patient not psychiatrically stable, patient not cleared for discharge at this time    This worker met with patient to discuss discharge plan.  Patient will go to [facility] when stable. Writer spoke with Cece @ [place] [phone] (fax) to collaborate. Patient is aware and agreeable to same.      Mental Status Exam:    Mood – Labile  Sleep - Fair  Appetite - Good  ADLs - Fair  Thought Process – Tangential flight of ideas   Observation – [id number]    No barriers to discharge identified at this time.     Patient is not psychiatrically stable for discharge.

## 2022-01-24 NOTE — PROGRESS NOTE BEHAVIORAL HEALTH - NSBHFUPINTERVALHXFT_PSY_A_CORE
Pt seen and evaluated, chart reviewed. As per nursing report, pt with multiple episodes of agitation over the weekend, several PRNs given with one-time escalation to IM d/t aggression towards a peer. On evaluation, pt presents elevated and grandiose, holding a bible and reciting verses, preservative on discharge. Pt repeatedly states she is "fine" because she has been "praying and sleeping good". She states she has not been sleeping for the last couple of months, but now "I go to sleep and wake up when I take my thyroid medicine". 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 She denies AVH and paranoia. Denies SI/HI, intent and plan; engaged in safety planning. Reinforced behavioral expectations and limit setting on unit, pt verbalizes understanding. Visible on unit and more responsive to verbal redirection. Pt adherent to medications, denies negative side effects; vpa level 72.   Pt denies s/sx of Covid19.    Spoke with pt's OP psychiatrist, Dr. Guevara ([phone]) - states pt has been calling her friend at the [facility] over the weekend, making statements such as she has been living for a million years and now has to die, that someone is out to kill her and she has to barricade herself to her room and jump out the window to escape. He reports at baseline pt is slow with borderline intellectual disability, concrete, persistent in getting what she wants, and "sub-euphoric"; reports since pt is an undocumented alien, she has "sponsors" who are often older men who given her monthly allowance. States pt's has been avoiding going to the clinic for the last month, accusing him of "trying to get her hospitalized". Pt seen and evaluated, chart reviewed. As per nursing report, pt with multiple episodes of agitation over the weekend, several PRNs given with one-time escalation to IM d/t aggression towards a peer. On evaluation, pt presents elevated and grandiose, holding a bible and reciting verses, preservative on discharge. Of note, pt offered Maltese , refused and states wants to converse in English only. Pt repeatedly states she is "fine" because she has been "praying and sleeping good". She states she has not been sleeping for the last couple of months, but now "I go to sleep and wake up when I take my thyroid medicine". 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 She denies AVH and paranoia. Denies SI/HI, intent and plan; engaged in safety planning. Reinforced behavioral expectations and limit setting on unit, pt verbalizes understanding. Visible on unit and more responsive to verbal redirection. Pt adherent to medications, denies negative side effects; vpa level 72.   Pt denies s/sx of Covid19.    Spoke with pt's OP psychiatrist, Dr. Guevara ([phone]) - states pt has been calling her friend at the [facility] over the weekend, making statements such as she has been living for a million years and now has to die, that someone is out to kill her and she has to barricade herself to her room and jump out the window to escape. He reports at baseline pt is slow with borderline intellectual disability, concrete, persistent in getting what she wants, and "sub-euphoric"; reports since pt is an undocumented alien, she has "sponsors" who are often older men who given her monthly allowance. States pt's has been avoiding going to the clinic for the last month, accusing him of "trying to get her hospitalized". Pt seen and evaluated, chart reviewed. As per nursing report, pt with multiple episodes of agitation over the weekend, several PRNs given with one-time escalation to IM d/t aggression towards a peer. On evaluation, pt presents elevated and grandiose, hyperverbal, holding a bible and reciting verses, preservative on discharge. Of note, pt offered Estonian , refused and states wants to converse in English only. Pt repeatedly states she is "fine" because she has been "praying and sleeping good". She states she has not been sleeping for the last couple of months, but now "I go to sleep and wake up when I take my thyroid medicine". 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 She denies AVH and paranoia. Denies SI/HI, intent and plan; engaged in safety planning. Reinforced behavioral expectations and limit setting on unit, pt verbalizes understanding. Visible on unit and more responsive to verbal redirection. Pt adherent to medications, denies negative side effects; vpa level 72.   Pt denies s/sx of Covid19.    Spoke with pt's OP psychiatrist, Dr. Guevara ([phone]) - states pt has been calling her friend at the [facility] over the weekend, making statements such as she has been living for a million years and now has to die, that someone is out to kill her and she has to barricade herself to her room and jump out the window to escape. He reports at baseline pt is slow with borderline intellectual disability, concrete, persistent in getting what she wants, and "sub-euphoric"; reports since pt is an undocumented alien, she has "sponsors" who are often older men who given her monthly allowance. States pt's has been avoiding going to the clinic for the last month, accusing him of "trying to get her hospitalized". Pt seen and evaluated, chart reviewed. As per nursing report, pt with multiple episodes of agitation over the weekend, several PRNs given with one-time escalation to IM d/t aggression towards a peer. On evaluation, pt presents elevated and hyperverbal, holding a bible and reciting verses, preservative on discharge. Of note, pt offered Puerto Rican , refused and states wants to converse in English only. Pt repeatedly states she is "fine" because she has been "praying and sleeping good". She states she has not been sleeping for the last couple of months, but now "I go to sleep and wake up when I take my thyroid medicine". 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 She denies AVH and paranoia. Denies SI/HI, intent and plan; engaged in safety planning. Reinforced behavioral expectations and limit setting on unit, pt verbalizes understanding. Visible on unit and more responsive to verbal redirection. Pt adherent to medications, denies negative side effects; vpa level 72.   Pt denies s/sx of Covid19.    Spoke with pt's OP psychiatrist, Dr. Guevara ([phone]) - states pt has been calling her friend at the [facility] over the weekend, making statements such as she has been living for a million years and now has to die, that someone is out to kill her and she has to barricade herself to her room and jump out the window to escape. He reports at baseline pt is slow with borderline intellectual disability, concrete, persistent in getting what she wants, and "sub-euphoric"; reports since pt is an undocumented alien, she has "sponsors" who are often older men who given her monthly allowance. States pt's has been avoiding going to the clinic for the last month, accusing him of "trying to get her hospitalized". Pt seen and evaluated, chart reviewed. As per nursing report, pt with multiple episodes of agitation over the weekend, several PRNs given with one-time escalation to IM d/t aggression towards a peer. On evaluation, pt presents elevated and hyperverbal, holding a bible and reciting verses, preservative on discharge. Of note, pt offered Chinese , refused and states wants to converse in English only. Pt repeatedly states she is "fine" because she has been "praying and sleeping good". She states she has not been sleeping for the last couple of months, but now "I go to sleep and wake up when I take my thyroid medicine". 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 She denies AVH and paranoia. Denies SI/HI, intent and plan; engaged in safety planning. Reinforced behavioral expectations and limit setting on unit, pt verbalizes understanding. Visible on unit and more responsive to verbal redirection. Pt adherent to medications, vpa level 72, c/o constipation x3 days, bowel regimen started.  Pt denies s/sx of Covid19.    Spoke with pt's OP psychiatrist, Dr. Guevara ([phone]) - states pt has been calling her friend at the [facility] over the weekend, making statements such as she has been living for a million years and now has to die, that someone is out to kill her and she has to barricade herself to her room and jump out the window to escape. He reports at baseline pt is slow with borderline intellectual disability, concrete, persistent in getting what she wants, and "sub-euphoric"; reports since pt is an undocumented alien, she has "sponsors" who are often older men who given her monthly allowance. States pt's has been avoiding going to the clinic for the last month, accusing him of "trying to get her hospitalized".

## 2022-01-24 NOTE — PROGRESS NOTE BEHAVIORAL HEALTH - ORIENTATION OTHER
Pt refuses to answer, tangential

## 2022-01-25 PROCEDURE — 99231 SBSQ HOSP IP/OBS SF/LOW 25: CPT

## 2022-01-25 RX ORDER — OLANZAPINE 15 MG/1
5 TABLET, FILM COATED ORAL EVERY 6 HOURS
Refills: 0 | Status: DISCONTINUED | OUTPATIENT
Start: 2022-01-25 | End: 2022-02-03

## 2022-01-25 RX ORDER — OLANZAPINE 15 MG/1
5 TABLET, FILM COATED ORAL AT BEDTIME
Refills: 0 | Status: DISCONTINUED | OUTPATIENT
Start: 2022-01-25 | End: 2022-02-03

## 2022-01-25 RX ADMIN — DIVALPROEX SODIUM 1000 MILLIGRAM(S): 500 TABLET, DELAYED RELEASE ORAL at 21:22

## 2022-01-25 RX ADMIN — Medication 2 MILLIGRAM(S): at 04:27

## 2022-01-25 RX ADMIN — HALOPERIDOL DECANOATE 5 MILLIGRAM(S): 100 INJECTION INTRAMUSCULAR at 04:51

## 2022-01-25 RX ADMIN — Medication 2 MILLIGRAM(S): at 08:36

## 2022-01-25 RX ADMIN — Medication 1 APPLICATION(S): at 21:21

## 2022-01-25 RX ADMIN — Medication 50 MILLIGRAM(S): at 01:38

## 2022-01-25 RX ADMIN — DIVALPROEX SODIUM 1000 MILLIGRAM(S): 500 TABLET, DELAYED RELEASE ORAL at 08:01

## 2022-01-25 RX ADMIN — Medication 50 MICROGRAM(S): at 06:38

## 2022-01-25 RX ADMIN — Medication 2 MILLIGRAM(S): at 22:13

## 2022-01-25 RX ADMIN — Medication 50 MILLIGRAM(S): at 21:22

## 2022-01-25 RX ADMIN — FAMOTIDINE 20 MILLIGRAM(S): 10 INJECTION INTRAVENOUS at 08:01

## 2022-01-25 RX ADMIN — Medication 2 MILLIGRAM(S): at 08:01

## 2022-01-25 RX ADMIN — Medication 1 PATCH: at 18:51

## 2022-01-25 RX ADMIN — Medication 50 MILLIGRAM(S): at 08:05

## 2022-01-25 RX ADMIN — HALOPERIDOL DECANOATE 5 MILLIGRAM(S): 100 INJECTION INTRAMUSCULAR at 08:37

## 2022-01-25 RX ADMIN — OLANZAPINE 5 MILLIGRAM(S): 15 TABLET, FILM COATED ORAL at 21:23

## 2022-01-25 RX ADMIN — FAMOTIDINE 20 MILLIGRAM(S): 10 INJECTION INTRAVENOUS at 21:21

## 2022-01-25 RX ADMIN — Medication 1 PATCH: at 08:08

## 2022-01-25 RX ADMIN — SENNA PLUS 2 TABLET(S): 8.6 TABLET ORAL at 21:22

## 2022-01-25 NOTE — PROGRESS NOTE BEHAVIORAL HEALTH - NSBHFUPINTERVALHXFT_PSY_A_CORE
Pt seen and evaluated, chart reviewed. As per nursing report, pt irritable with insomnia overnight, multiple PRNs given with limited response; this AM, pt agitated and verbally aggressive with another peer, limited response to verbal redirection, pt accepted PRN haldol/ativan PO. On evaluation, pt presents cooperative and tangential, intrusive at times but more verbally redirectable than prior evaluation. She reports she is doing "fine" and preservative on returning to [place]. She reports good mood and appetite, reports resolution of constipation. She reports good sleep despite staff report; pt then states she has problems with sleep, reports h/o zyprexa with good response and requests to retrial. She denies AVH. Denies paranoia, states she feels safe on unit, agrees to avoid peer she had argument with earlier. She denies prior delusions that she has lived a million years and is a queen, continues to state she has to return to work to makes millions. She denies SI/HI, intent and plan. Pt adherent to medications, denies negative side effects. Pt visible on unit and less intrusive.  Pt denies s/sx of Covid-19.

## 2022-01-25 NOTE — PROGRESS NOTE BEHAVIORAL HEALTH - NSBHADMITMEDEDUDETAILS_A_CORE FT
Reeducated on risks and benefits of zyprexa, and side effects profile. Pt verbalized understanding. As per chart review, questionable ADR of LE edema; pt denies LE edema 2/2 zyprexa, reports h/o good response to zyprexa and verbalizes understanding to monitor for ADR.

## 2022-01-25 NOTE — PROGRESS NOTE BEHAVIORAL HEALTH - NSBHCHARTREVIEWVS_PSY_A_CORE FT
Vital Signs Last 24 Hrs  T(C): 35.4 (01-25-22 @ 08:48), Max: 36.4 (01-24-22 @ 15:41)  T(F): 95.8 (01-25-22 @ 08:48), Max: 97.5 (01-24-22 @ 15:41)  HR: 97 (01-25-22 @ 08:48) (81 - 113)  BP: 129/86 (01-25-22 @ 08:48) (108/56 - 129/86)  BP(mean): --  RR: 18 (01-25-22 @ 08:48) (17 - 18)  SpO2: --

## 2022-01-25 NOTE — CHART NOTE - NSCHARTNOTEFT_GEN_A_CORE
Pt was seen, evaluated, chart reviewed. On evaluation, pt reports that he is doing well and ready for discharge. Has been in good behavioral control. He is compliant with medications, denies negative side effects. Endorsed improved sleep and appetite. Denied auditory/visual hallucinations. Denies paranoia. Denied suicidal/homicidal ideation, intent or plan. Pt is for discharge today. Agreeable with outpatient follow up. Pt verbalizes understanding and agrees to discharge instructions.

## 2022-01-25 NOTE — PROGRESS NOTE BEHAVIORAL HEALTH - SUMMARY
37 y/o single domiciled unemployed woman with past medical history of hypothyroidism and past psychiatric history of schizoaffective disorder bipolar type who was brought in by ambulance at the advice of her psychiatrist for increasingly erratic and manic behavior in the setting of possible medication non-compliance, found to be acutely manic and disorganized. Her presentation appears consistent with decompensation of schizoaffective d/o bipolar type in setting of possible medication nonadherence. Pt will likely benefit from IPP at this time.    #Schizoaffective d/o, bipolar type  -d/c depakote er 1000 mg qhs (1/19)  -load depakote dr 500 mg once / 1250 mg qHS once (1/19), start depakote dr 750 mg bid (1/20), f/u vpa level 72 (1/23) --> titrate depakote 1000 mg bid (1/24), f/u vpa level in 4 days  -c/w haldol dec 150 mg q 4 weeks (last dose given 1/4/22, next due 2/1/22, confirmed)  -c/w gabapentin 300 mg qhs  -c/w benztropine 2 mg daily  -start zyprexa 5 mg qhs (1/25)    #Hypothyroidism  -c/w synthroid 50 mcg daily    #Constipation  -start senna 2 mg qhs  -start miralax 17g daily prn    #Agitation  -for agitation not amenable to verbal redirection, may give zyprexa 5 mg q6h prn with escalation to IM if pt is a danger to self or/and others with repeat EKG to ensure QTc <500 ms

## 2022-01-26 PROCEDURE — 99231 SBSQ HOSP IP/OBS SF/LOW 25: CPT

## 2022-01-26 RX ADMIN — SENNA PLUS 2 TABLET(S): 8.6 TABLET ORAL at 20:39

## 2022-01-26 RX ADMIN — Medication 650 MILLIGRAM(S): at 07:49

## 2022-01-26 RX ADMIN — DIVALPROEX SODIUM 1000 MILLIGRAM(S): 500 TABLET, DELAYED RELEASE ORAL at 20:38

## 2022-01-26 RX ADMIN — Medication 1 APPLICATION(S): at 20:40

## 2022-01-26 RX ADMIN — Medication 2 MILLIGRAM(S): at 13:15

## 2022-01-26 RX ADMIN — Medication 1 APPLICATION(S): at 07:44

## 2022-01-26 RX ADMIN — FAMOTIDINE 20 MILLIGRAM(S): 10 INJECTION INTRAVENOUS at 20:38

## 2022-01-26 RX ADMIN — DIVALPROEX SODIUM 1000 MILLIGRAM(S): 500 TABLET, DELAYED RELEASE ORAL at 07:44

## 2022-01-26 RX ADMIN — Medication 50 MICROGRAM(S): at 06:31

## 2022-01-26 RX ADMIN — Medication 50 MILLIGRAM(S): at 07:50

## 2022-01-26 RX ADMIN — Medication 2 MILLIGRAM(S): at 10:07

## 2022-01-26 RX ADMIN — Medication 2 MILLIGRAM(S): at 15:49

## 2022-01-26 RX ADMIN — Medication 2 MILLIGRAM(S): at 07:49

## 2022-01-26 RX ADMIN — Medication 1 PATCH: at 07:43

## 2022-01-26 RX ADMIN — FAMOTIDINE 20 MILLIGRAM(S): 10 INJECTION INTRAVENOUS at 07:44

## 2022-01-26 RX ADMIN — Medication 2 MILLIGRAM(S): at 07:45

## 2022-01-26 RX ADMIN — OLANZAPINE 5 MILLIGRAM(S): 15 TABLET, FILM COATED ORAL at 20:39

## 2022-01-26 RX ADMIN — Medication 2 MILLIGRAM(S): at 15:38

## 2022-01-26 NOTE — PROGRESS NOTE BEHAVIORAL HEALTH - SUMMARY
35 y/o single domiciled unemployed woman with past medical history of hypothyroidism and past psychiatric history of schizoaffective disorder bipolar type who was brought in by ambulance at the advice of her psychiatrist for increasingly erratic and manic behavior in the setting of possible medication non-compliance, found to be acutely manic and disorganized. Her presentation appears consistent with decompensation of schizoaffective d/o bipolar type in setting of possible medication nonadherence. Pt will likely benefit from IPP at this time.     #Schizoaffective d/o, bipolar type  -d/c depakote er 1000 mg qhs (1/19)  -load depakote dr 500 mg once / 1250 mg qHS once (1/19), start depakote dr 750 mg bid (1/20), f/u vpa level 72 (1/23) --> titrate depakote 1000 mg bid (1/24), f/u vpa level in 4 days  -c/w haldol dec 150 mg q 4 weeks (last dose given 1/4/22, next due 2/1/22, confirmed)  -c/w benztropine 2 mg daily  -c/w gabapentin 300 mg qhs --> d/c 1/25  -start zyprexa 5 mg qhs (1/25)    #Hypothyroidism  -c/w synthroid 50 mcg daily    #Constipation  -start senna 2 mg qhs  -start miralax 17g daily prn    #Agitation  -for agitation not amenable to verbal redirection, may give zyprexa 5 mg q6h prn with escalation to IM if pt is a danger to self or/and others with repeat EKG to ensure QTc <500 ms 37 y/o single domiciled unemployed woman with past medical history of hypothyroidism and past psychiatric history of schizoaffective disorder bipolar type who was brought in by ambulance at the advice of her psychiatrist for increasingly erratic and manic behavior in the setting of possible medication non-compliance, found to be acutely manic and disorganized. Her presentation appears consistent with decompensation of schizoaffective d/o bipolar type in setting of possible medication nonadherence. Pt will likely benefit from IPP at this time.     #Schizoaffective d/o, bipolar type  -d/c depakote er 1000 mg qhs (1/19)  -load depakote dr 500 mg once / 1250 mg qHS once (1/19), start depakote dr 750 mg bid (1/20), f/u vpa level 72 (1/23) --> titrate depakote 1000 mg bid (1/24), f/u vpa level on 1/28  -c/w haldol dec 150 mg q 4 weeks (last dose given 1/4/22, next due 2/1/22, confirmed)  -c/w benztropine 2 mg daily  -c/w gabapentin 300 mg qhs --> d/c 1/25  -start zyprexa 5 mg qhs (1/25)    #Hypothyroidism  -c/w synthroid 50 mcg daily    #Constipation  -start senna 2 mg qhs  -start miralax 17g daily prn    #Agitation  -for agitation not amenable to verbal redirection, may give zyprexa 5 mg q6h prn with escalation to IM if pt is a danger to self or/and others with repeat EKG to ensure QTc <500 ms

## 2022-01-26 NOTE — PROGRESS NOTE BEHAVIORAL HEALTH - NSBHFUPINTERVALHXFT_PSY_A_CORE
Pt seen and evaluated, chart reviewed. As per nursing report, pt agitated and pacing overnight, PRN benadryl and ativan given with fair response, observed to sleep; this AM, pt had verbal altercation with a disruptive peer, limited response to verbal redirection, pt accepted PRN benadryl and ativan PO. On evaluation, pt presents in bed and sleeping, requested for this writer to come back later. Later in day, pt presents talking to peers appropriately in hallway with improved grooming, intrusive but more redirectable. She reports she is doing "fine", endorses good mood and appetite, states she slept "all night" last night. She denies AVH. Denies paranoia, states she feels safe on unit, agrees to avoid disruptive peers. 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 She denies SI/HI, intent and plan. Pt adherent to medications, denies negative side effects. Pt visible on unit with improved behavioral control.   Pt denies s/sx of Covid-19. Pt seen and evaluated, chart reviewed. As per nursing report, pt agitated and pacing overnight, PRN benadryl and ativan given with fair response, observed to sleep; this AM, pt had verbal altercation with a disruptive peer, limited response to verbal redirection, pt accepted PRN benadryl and ativan PO. On evaluation, pt presents in bed and sleeping, requested for this writer to come back later. Later in day, pt presents talking to peers appropriately in hallway with improved grooming, intrusive but more redirectable. She reports she is doing "fine", endorses good mood and appetite, states she slept "all night" last night. She denies AVH. Denies paranoia, states she feels safe on unit, agrees to avoid disruptive peers. 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 She denies SI/HI, intent and plan. Pt adherent to medications, denies negative side effects. Pt visible on unit with improved behavioral control.   Pt denies s/sx of Covid-19.    Later in day, pt preservative on discharge, demanding to leave today to go back to [facility]; when reeducated on treatment plan and goals, pt became increasingly agitated, began to state she has "many degrees in psychiatry, I know more than you", began to verbally escalate with aggressive posturing, limited response to verbal redirection, accepted PRN ativan PO with good response.

## 2022-01-26 NOTE — PROGRESS NOTE BEHAVIORAL HEALTH - NSBHCHARTREVIEWVS_PSY_A_CORE FT
Vital Signs Last 24 Hrs  T(C): 35.5 (26 Jan 2022 05:58), Max: 35.7 (25 Jan 2022 15:30)  T(F): 95.9 (26 Jan 2022 05:58), Max: 96.3 (25 Jan 2022 15:30)  HR: 87 (26 Jan 2022 05:58) (87 - 93)  BP: 131/84 (26 Jan 2022 05:58) (131/84 - 139/85)  BP(mean): --  RR: 18 (26 Jan 2022 05:58) (16 - 18)  SpO2: --

## 2022-01-27 LAB — SARS-COV-2 RNA SPEC QL NAA+PROBE: SIGNIFICANT CHANGE UP

## 2022-01-27 PROCEDURE — 99231 SBSQ HOSP IP/OBS SF/LOW 25: CPT

## 2022-01-27 RX ADMIN — DIVALPROEX SODIUM 1000 MILLIGRAM(S): 500 TABLET, DELAYED RELEASE ORAL at 08:03

## 2022-01-27 RX ADMIN — Medication 1 APPLICATION(S): at 20:14

## 2022-01-27 RX ADMIN — Medication 1 PATCH: at 08:04

## 2022-01-27 RX ADMIN — Medication 50 MILLIGRAM(S): at 21:56

## 2022-01-27 RX ADMIN — OLANZAPINE 5 MILLIGRAM(S): 15 TABLET, FILM COATED ORAL at 20:08

## 2022-01-27 RX ADMIN — OLANZAPINE 5 MILLIGRAM(S): 15 TABLET, FILM COATED ORAL at 08:02

## 2022-01-27 RX ADMIN — FAMOTIDINE 20 MILLIGRAM(S): 10 INJECTION INTRAVENOUS at 20:09

## 2022-01-27 RX ADMIN — Medication 650 MILLIGRAM(S): at 19:03

## 2022-01-27 RX ADMIN — Medication 1 APPLICATION(S): at 08:04

## 2022-01-27 RX ADMIN — SENNA PLUS 2 TABLET(S): 8.6 TABLET ORAL at 20:09

## 2022-01-27 RX ADMIN — Medication 2 MILLIGRAM(S): at 18:13

## 2022-01-27 RX ADMIN — Medication 50 MICROGRAM(S): at 06:13

## 2022-01-27 RX ADMIN — Medication 50 MILLIGRAM(S): at 11:01

## 2022-01-27 RX ADMIN — Medication 1 PATCH: at 18:12

## 2022-01-27 RX ADMIN — Medication 1 PATCH: at 07:38

## 2022-01-27 RX ADMIN — Medication 1 PATCH: at 08:02

## 2022-01-27 RX ADMIN — DIVALPROEX SODIUM 1000 MILLIGRAM(S): 500 TABLET, DELAYED RELEASE ORAL at 20:08

## 2022-01-27 RX ADMIN — Medication 2 MILLIGRAM(S): at 08:03

## 2022-01-27 RX ADMIN — Medication 650 MILLIGRAM(S): at 19:18

## 2022-01-27 RX ADMIN — Medication 2 MILLIGRAM(S): at 08:02

## 2022-01-27 RX ADMIN — FAMOTIDINE 20 MILLIGRAM(S): 10 INJECTION INTRAVENOUS at 08:03

## 2022-01-27 NOTE — PROGRESS NOTE BEHAVIORAL HEALTH - NSBHCHARTREVIEWVS_PSY_A_CORE FT
ICU Vital Signs Last 24 Hrs  T(C): 35.9 (27 Jan 2022 09:02), Max: 36.8 (27 Jan 2022 06:00)  T(F): 96.7 (27 Jan 2022 09:02), Max: 98.2 (27 Jan 2022 06:00)  HR: 108 (27 Jan 2022 09:02) (98 - 108)  BP: 150/70 (27 Jan 2022 09:02) (149/90 - 150/70)  BP(mean): --  ABP: --  ABP(mean): --  RR: 16 (27 Jan 2022 09:02) (16 - 20)  SpO2: --

## 2022-01-27 NOTE — PROGRESS NOTE BEHAVIORAL HEALTH - SUMMARY
37 y/o single domiciled unemployed woman with past medical history of hypothyroidism and past psychiatric history of schizoaffective disorder bipolar type who was brought in by ambulance at the advice of her psychiatrist for increasingly erratic and manic behavior in the setting of possible medication non-compliance, found to be acutely manic and disorganized. Her presentation appears consistent with decompensation of schizoaffective d/o bipolar type in setting of possible medication nonadherence. Pt will likely benefit from IPP at this time.     #Schizoaffective d/o, bipolar type  -d/c depakote er 1000 mg qhs (1/19)  -load depakote dr 500 mg once / 1250 mg qHS once (1/19), start depakote dr 750 mg bid (1/20), f/u vpa level 72 (1/23) --> titrate depakote 1000 mg bid (1/24), f/u vpa level on 1/28  -c/w haldol dec 150 mg q 4 weeks (last dose given 1/4/22, next due 2/1/22, confirmed)  -c/w benztropine 2 mg daily  -c/w gabapentin 300 mg qhs --> d/c 1/25  -c/w zyprexa 5 mg qhs (1/25)    #Hypothyroidism  -c/w synthroid 50 mcg daily    #Constipation  -start senna 2 mg qhs  -start miralax 17g daily prn    #Agitation  -for agitation not amenable to verbal redirection, may give zyprexa 5 mg q6h prn with escalation to IM if pt is a danger to self or/and others with repeat EKG to ensure QTc <500 ms

## 2022-01-27 NOTE — PROGRESS NOTE BEHAVIORAL HEALTH - NSBHFUPINTERVALHXFT_PSY_A_CORE
Pt was seen, evaluated, chart reviewed. As per nursing staff, no events overnight. On evaluation, pt reports she is "fine." Pt is less intrusive and less disorganized. More linear in her thought process. States she feels "calmer." No new complaints. Is compliant with medication, denies negative side effects. Endorsed good sleep and appetite. Denied auditory/visual hallucinations. Denied suicidal/homicidal ideation, intent or plan. States she is interested in going back to  [facility].

## 2022-01-28 DIAGNOSIS — F17.200 NICOTINE DEPENDENCE, UNSPECIFIED, UNCOMPLICATED: ICD-10-CM

## 2022-01-28 LAB — VALPROATE SERPL-MCNC: 97 UG/ML — SIGNIFICANT CHANGE UP (ref 50–100)

## 2022-01-28 PROCEDURE — 99231 SBSQ HOSP IP/OBS SF/LOW 25: CPT

## 2022-01-28 RX ORDER — HALOPERIDOL DECANOATE 100 MG/ML
150 INJECTION INTRAMUSCULAR ONCE
Refills: 0 | Status: COMPLETED | OUTPATIENT
Start: 2022-02-01 | End: 2022-02-01

## 2022-01-28 RX ADMIN — DIVALPROEX SODIUM 1000 MILLIGRAM(S): 500 TABLET, DELAYED RELEASE ORAL at 20:07

## 2022-01-28 RX ADMIN — Medication 1 PATCH: at 07:57

## 2022-01-28 RX ADMIN — Medication 1 PATCH: at 18:35

## 2022-01-28 RX ADMIN — Medication 2 MILLIGRAM(S): at 17:08

## 2022-01-28 RX ADMIN — Medication 2 MILLIGRAM(S): at 13:39

## 2022-01-28 RX ADMIN — Medication 1 APPLICATION(S): at 20:07

## 2022-01-28 RX ADMIN — OLANZAPINE 5 MILLIGRAM(S): 15 TABLET, FILM COATED ORAL at 20:07

## 2022-01-28 RX ADMIN — FAMOTIDINE 20 MILLIGRAM(S): 10 INJECTION INTRAVENOUS at 20:07

## 2022-01-28 RX ADMIN — FAMOTIDINE 20 MILLIGRAM(S): 10 INJECTION INTRAVENOUS at 08:02

## 2022-01-28 RX ADMIN — Medication 2 MILLIGRAM(S): at 11:48

## 2022-01-28 RX ADMIN — Medication 2 MILLIGRAM(S): at 09:23

## 2022-01-28 RX ADMIN — Medication 1 APPLICATION(S): at 08:03

## 2022-01-28 RX ADMIN — OLANZAPINE 5 MILLIGRAM(S): 15 TABLET, FILM COATED ORAL at 22:56

## 2022-01-28 RX ADMIN — POLYETHYLENE GLYCOL 3350 17 GRAM(S): 17 POWDER, FOR SOLUTION ORAL at 04:30

## 2022-01-28 RX ADMIN — Medication 50 MICROGRAM(S): at 06:24

## 2022-01-28 RX ADMIN — OLANZAPINE 5 MILLIGRAM(S): 15 TABLET, FILM COATED ORAL at 11:48

## 2022-01-28 RX ADMIN — Medication 2 MILLIGRAM(S): at 22:56

## 2022-01-28 RX ADMIN — Medication 50 MILLIGRAM(S): at 11:48

## 2022-01-28 RX ADMIN — DIVALPROEX SODIUM 1000 MILLIGRAM(S): 500 TABLET, DELAYED RELEASE ORAL at 08:03

## 2022-01-28 RX ADMIN — SENNA PLUS 2 TABLET(S): 8.6 TABLET ORAL at 20:07

## 2022-01-28 RX ADMIN — Medication 2 MILLIGRAM(S): at 08:02

## 2022-01-28 RX ADMIN — Medication 1 PATCH: at 08:03

## 2022-01-28 RX ADMIN — Medication 50 MILLIGRAM(S): at 20:08

## 2022-01-28 NOTE — PROGRESS NOTE BEHAVIORAL HEALTH - NSBHCHARTREVIEWVS_PSY_A_CORE FT
ICU Vital Signs Last 24 Hrs  T(C): 35.9 (27 Jan 2022 16:05), Max: 35.9 (27 Jan 2022 16:05)  T(F): 96.6 (27 Jan 2022 16:05), Max: 96.6 (27 Jan 2022 16:05)  HR: 97 (27 Jan 2022 16:05) (97 - 97)  BP: 124/86 (27 Jan 2022 16:05) (124/86 - 124/86)  BP(mean): --  ABP: --  ABP(mean): --  RR: 16 (27 Jan 2022 16:05) (16 - 16)  SpO2: --

## 2022-01-28 NOTE — PROGRESS NOTE BEHAVIORAL HEALTH - NSBHFUPINTERVALHXFT_PSY_A_CORE
Pt was seen, evaluated, chart reviewed. As per nursing staff, no events overnight. On evaluation, pt reports she is "fine" and is waiting to return to [facility]. Is compliant with medication, denies negative side effects. States she is also waiting for her next haldol decanoate injection. Endorsed good sleep and appetite. 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 Has been more linear in her thought process and less intrusive on the unit. Denied auditory/visual hallucinations. Denied paranoia. Denied suicidal/homicidal ideation, intent or plan.

## 2022-01-28 NOTE — CHART NOTE - NSCHARTNOTEFT_GEN_A_CORE
Writer met with patient; Pt assessed writer provided support and education. Treatment plan and discharge plan discussed. Patient is compliant with treatment and unit protocol. Patient is taking medications as prescribed. Patient remains manic tangential preservative and difficult to redirect. Patient is with potential for unpredictable behavior. Patient contracts for safety on the unit. Patient denies current suicidal and or homicidal ideations. Patient denies audio visual hallucinations but presents as internally preoccupied. Patient is in good behavioral control at this time. Activities of daily living are within normal limits. Patient to remain on unit until cleared by attending for discharge.    Mental Status Exam:    Mood – Labile  Sleep - Fair  Appetite - Good  ADLs - WNL  Thought Process – Tangential   Observation – [id number]    No barriers to discharge identified at this time. Patient to go to [facility] [place] [facility] when stable. Writer spoke with KAYE Zhao and faxed progress notes. Patient not stable for discharge at this time.

## 2022-01-28 NOTE — PROGRESS NOTE BEHAVIORAL HEALTH - SUMMARY
37 y/o single domiciled unemployed woman with past medical history of hypothyroidism and past psychiatric history of schizoaffective disorder bipolar type who was brought in by ambulance at the advice of her psychiatrist for increasingly erratic and manic behavior in the setting of possible medication non-compliance, found to be acutely manic and disorganized. Her presentation appears consistent with decompensation of schizoaffective d/o bipolar type in setting of possible medication nonadherence. Pt will likely benefit from IPP at this time.     #Schizoaffective d/o, bipolar type  - Continue depakote 1000 mg bid (1/24), vpa level on 1/28 is 97.0  -c/w haldol dec 150 mg q 4 weeks (last dose given 1/4/22, next due 2/1/22, confirmed)  -c/w benztropine 2 mg daily  -c/w zyprexa 5 mg qhs (1/25)    #Hypothyroidism  -c/w synthroid 50 mcg daily    #Nicotine dependence   - Continue nicotine patch 21 mg qd     #Constipation  -start senna 2 mg qhs  -start miralax 17g daily prn    #Agitation  -for agitation not amenable to verbal redirection, may give zyprexa 5 mg q6h prn with escalation to IM if pt is a danger to self or/and others with repeat EKG to ensure QTc <500 ms

## 2022-01-29 LAB — SARS-COV-2 RNA SPEC QL NAA+PROBE: SIGNIFICANT CHANGE UP

## 2022-01-29 RX ORDER — HYDROXYZINE HCL 10 MG
50 TABLET ORAL ONCE
Refills: 0 | Status: COMPLETED | OUTPATIENT
Start: 2022-01-29 | End: 2022-01-29

## 2022-01-29 RX ADMIN — Medication 1 APPLICATION(S): at 21:03

## 2022-01-29 RX ADMIN — Medication 650 MILLIGRAM(S): at 08:24

## 2022-01-29 RX ADMIN — Medication 1 PATCH: at 19:23

## 2022-01-29 RX ADMIN — OLANZAPINE 5 MILLIGRAM(S): 15 TABLET, FILM COATED ORAL at 21:03

## 2022-01-29 RX ADMIN — Medication 50 MILLIGRAM(S): at 19:20

## 2022-01-29 RX ADMIN — Medication 650 MILLIGRAM(S): at 15:58

## 2022-01-29 RX ADMIN — Medication 2 MILLIGRAM(S): at 19:20

## 2022-01-29 RX ADMIN — Medication 1 PATCH: at 08:20

## 2022-01-29 RX ADMIN — Medication 2 MILLIGRAM(S): at 14:51

## 2022-01-29 RX ADMIN — DIVALPROEX SODIUM 1000 MILLIGRAM(S): 500 TABLET, DELAYED RELEASE ORAL at 08:23

## 2022-01-29 RX ADMIN — Medication 1 PATCH: at 08:22

## 2022-01-29 RX ADMIN — Medication 1 APPLICATION(S): at 08:23

## 2022-01-29 RX ADMIN — Medication 50 MICROGRAM(S): at 06:04

## 2022-01-29 RX ADMIN — Medication 650 MILLIGRAM(S): at 09:00

## 2022-01-29 RX ADMIN — Medication 650 MILLIGRAM(S): at 16:30

## 2022-01-29 RX ADMIN — DIVALPROEX SODIUM 1000 MILLIGRAM(S): 500 TABLET, DELAYED RELEASE ORAL at 21:01

## 2022-01-29 RX ADMIN — Medication 50 MILLIGRAM(S): at 00:41

## 2022-01-29 RX ADMIN — Medication 2 MILLIGRAM(S): at 08:23

## 2022-01-29 RX ADMIN — FAMOTIDINE 20 MILLIGRAM(S): 10 INJECTION INTRAVENOUS at 08:23

## 2022-01-29 RX ADMIN — SENNA PLUS 2 TABLET(S): 8.6 TABLET ORAL at 21:02

## 2022-01-29 RX ADMIN — FAMOTIDINE 20 MILLIGRAM(S): 10 INJECTION INTRAVENOUS at 21:01

## 2022-01-29 RX ADMIN — Medication 2 MILLIGRAM(S): at 13:05

## 2022-01-29 RX ADMIN — OLANZAPINE 5 MILLIGRAM(S): 15 TABLET, FILM COATED ORAL at 13:02

## 2022-01-30 RX ADMIN — Medication 50 MILLIGRAM(S): at 20:13

## 2022-01-30 RX ADMIN — OLANZAPINE 5 MILLIGRAM(S): 15 TABLET, FILM COATED ORAL at 20:14

## 2022-01-30 RX ADMIN — Medication 1 APPLICATION(S): at 20:13

## 2022-01-30 RX ADMIN — SENNA PLUS 2 TABLET(S): 8.6 TABLET ORAL at 20:14

## 2022-01-30 RX ADMIN — DIVALPROEX SODIUM 1000 MILLIGRAM(S): 500 TABLET, DELAYED RELEASE ORAL at 20:13

## 2022-01-30 RX ADMIN — OLANZAPINE 5 MILLIGRAM(S): 15 TABLET, FILM COATED ORAL at 14:04

## 2022-01-30 RX ADMIN — Medication 2 MILLIGRAM(S): at 20:19

## 2022-01-30 RX ADMIN — Medication 1 PATCH: at 09:33

## 2022-01-30 RX ADMIN — FAMOTIDINE 20 MILLIGRAM(S): 10 INJECTION INTRAVENOUS at 09:32

## 2022-01-30 RX ADMIN — Medication 1 APPLICATION(S): at 09:33

## 2022-01-30 RX ADMIN — FAMOTIDINE 20 MILLIGRAM(S): 10 INJECTION INTRAVENOUS at 20:13

## 2022-01-30 RX ADMIN — Medication 2 MILLIGRAM(S): at 05:01

## 2022-01-30 RX ADMIN — Medication 1 PATCH: at 18:20

## 2022-01-30 RX ADMIN — Medication 50 MILLIGRAM(S): at 05:02

## 2022-01-30 RX ADMIN — Medication 50 MILLIGRAM(S): at 14:04

## 2022-01-30 RX ADMIN — Medication 2 MILLIGRAM(S): at 09:33

## 2022-01-30 RX ADMIN — Medication 2 MILLIGRAM(S): at 20:12

## 2022-01-30 RX ADMIN — Medication 1 PATCH: at 08:00

## 2022-01-30 RX ADMIN — Medication 1 PATCH: at 07:28

## 2022-01-30 RX ADMIN — Medication 50 MICROGRAM(S): at 05:01

## 2022-01-30 RX ADMIN — DIVALPROEX SODIUM 1000 MILLIGRAM(S): 500 TABLET, DELAYED RELEASE ORAL at 09:33

## 2022-01-31 LAB — SARS-COV-2 RNA SPEC QL NAA+PROBE: SIGNIFICANT CHANGE UP

## 2022-01-31 PROCEDURE — 99231 SBSQ HOSP IP/OBS SF/LOW 25: CPT

## 2022-01-31 RX ADMIN — POLYETHYLENE GLYCOL 3350 17 GRAM(S): 17 POWDER, FOR SOLUTION ORAL at 16:08

## 2022-01-31 RX ADMIN — Medication 2 MILLIGRAM(S): at 08:13

## 2022-01-31 RX ADMIN — SENNA PLUS 2 TABLET(S): 8.6 TABLET ORAL at 20:06

## 2022-01-31 RX ADMIN — DIVALPROEX SODIUM 1000 MILLIGRAM(S): 500 TABLET, DELAYED RELEASE ORAL at 20:05

## 2022-01-31 RX ADMIN — OLANZAPINE 5 MILLIGRAM(S): 15 TABLET, FILM COATED ORAL at 20:05

## 2022-01-31 RX ADMIN — FAMOTIDINE 20 MILLIGRAM(S): 10 INJECTION INTRAVENOUS at 08:13

## 2022-01-31 RX ADMIN — Medication 2 MILLIGRAM(S): at 19:26

## 2022-01-31 RX ADMIN — Medication 1 PATCH: at 19:47

## 2022-01-31 RX ADMIN — Medication 2 MILLIGRAM(S): at 05:30

## 2022-01-31 RX ADMIN — Medication 1 APPLICATION(S): at 08:15

## 2022-01-31 RX ADMIN — Medication 1 PATCH: at 08:14

## 2022-01-31 RX ADMIN — FAMOTIDINE 20 MILLIGRAM(S): 10 INJECTION INTRAVENOUS at 20:05

## 2022-01-31 RX ADMIN — Medication 50 MICROGRAM(S): at 05:30

## 2022-01-31 RX ADMIN — Medication 50 MILLIGRAM(S): at 19:26

## 2022-01-31 RX ADMIN — OLANZAPINE 5 MILLIGRAM(S): 15 TABLET, FILM COATED ORAL at 19:26

## 2022-01-31 RX ADMIN — DIVALPROEX SODIUM 1000 MILLIGRAM(S): 500 TABLET, DELAYED RELEASE ORAL at 08:13

## 2022-01-31 NOTE — PROGRESS NOTE BEHAVIORAL HEALTH - SUMMARY
35 y/o single domiciled unemployed woman with past medical history of hypothyroidism and past psychiatric history of schizoaffective disorder bipolar type who was brought in by ambulance at the advice of her psychiatrist for increasingly erratic and manic behavior in the setting of possible medication non-compliance, found to be acutely manic and disorganized. Her presentation appears consistent with decompensation of schizoaffective d/o bipolar type in setting of possible medication nonadherence. Pt will likely benefit from IPP at this time.     #Schizoaffective d/o, bipolar type  - Continue depakote 1000 mg bid (1/24), vpa level on 1/28 is 97.0  -c/w haldol dec 150 mg q 4 weeks (last dose given 1/4/22, next due 2/1/22, confirmed)  -c/w benztropine 2 mg daily  -c/w zyprexa 5 mg qhs (1/25)    #Hypothyroidism  -c/w synthroid 50 mcg daily    #Nicotine dependence   - Continue nicotine patch 21 mg qd     #Constipation  -start senna 2 mg qhs  -start miralax 17g daily prn    #Agitation  -for agitation not amenable to verbal redirection, may give zyprexa 5 mg q6h prn with escalation to IM if pt is a danger to self or/and others with repeat EKG to ensure QTc <500 ms

## 2022-01-31 NOTE — PROGRESS NOTE BEHAVIORAL HEALTH - NSBHFUPINTERVALHXFT_PSY_A_CORE
Pt was seen, evaluated, chart reviewed. As per nursing staff, no events overnight. Pt received several PO PRNs last evening for agitation. On evaluation, pt is preoccupied with discharge, stating she is "going home tomorrow" after she receives Haldol decanoate. Is compliant with medication, denies negative side effects. At times, pt is circumstantial, requires verbal re-direction. Endorsed good sleep and appetite. Denied auditory/visual hallucinations. Denied paranoia. Denied suicidal/homicidal ideation, intent or plan.

## 2022-01-31 NOTE — PROGRESS NOTE BEHAVIORAL HEALTH - NSBHCHARTREVIEWVS_PSY_A_CORE FT
ICU Vital Signs Last 24 Hrs  T(C): 36.1 (31 Jan 2022 08:26), Max: 36.1 (31 Jan 2022 08:26)  T(F): 96.9 (31 Jan 2022 08:26), Max: 96.9 (31 Jan 2022 08:26)  HR: 95 (31 Jan 2022 08:26) (95 - 103)  BP: 143/93 (31 Jan 2022 08:26) (143/93 - 178/91)  BP(mean): --  ABP: --  ABP(mean): --  RR: 20 (31 Jan 2022 08:26) (20 - 20)  SpO2: --

## 2022-02-01 PROCEDURE — 99231 SBSQ HOSP IP/OBS SF/LOW 25: CPT

## 2022-02-01 RX ADMIN — Medication 50 MILLIGRAM(S): at 20:09

## 2022-02-01 RX ADMIN — Medication 2 MILLIGRAM(S): at 11:42

## 2022-02-01 RX ADMIN — DIVALPROEX SODIUM 1000 MILLIGRAM(S): 500 TABLET, DELAYED RELEASE ORAL at 08:14

## 2022-02-01 RX ADMIN — POLYETHYLENE GLYCOL 3350 17 GRAM(S): 17 POWDER, FOR SOLUTION ORAL at 17:36

## 2022-02-01 RX ADMIN — Medication 650 MILLIGRAM(S): at 12:30

## 2022-02-01 RX ADMIN — Medication 2 MILLIGRAM(S): at 08:15

## 2022-02-01 RX ADMIN — FAMOTIDINE 20 MILLIGRAM(S): 10 INJECTION INTRAVENOUS at 20:09

## 2022-02-01 RX ADMIN — Medication 1 PATCH: at 08:15

## 2022-02-01 RX ADMIN — FAMOTIDINE 20 MILLIGRAM(S): 10 INJECTION INTRAVENOUS at 08:15

## 2022-02-01 RX ADMIN — Medication 1 PATCH: at 07:25

## 2022-02-01 RX ADMIN — OLANZAPINE 5 MILLIGRAM(S): 15 TABLET, FILM COATED ORAL at 20:09

## 2022-02-01 RX ADMIN — Medication 50 MICROGRAM(S): at 05:40

## 2022-02-01 RX ADMIN — OLANZAPINE 5 MILLIGRAM(S): 15 TABLET, FILM COATED ORAL at 08:15

## 2022-02-01 RX ADMIN — Medication 1 PATCH: at 18:14

## 2022-02-01 RX ADMIN — Medication 1 APPLICATION(S): at 08:16

## 2022-02-01 RX ADMIN — Medication 650 MILLIGRAM(S): at 11:42

## 2022-02-01 RX ADMIN — HALOPERIDOL DECANOATE 150 MILLIGRAM(S): 100 INJECTION INTRAMUSCULAR at 14:55

## 2022-02-01 RX ADMIN — Medication 1 PATCH: at 08:05

## 2022-02-01 RX ADMIN — SENNA PLUS 2 TABLET(S): 8.6 TABLET ORAL at 20:09

## 2022-02-01 RX ADMIN — Medication 2 MILLIGRAM(S): at 20:10

## 2022-02-01 RX ADMIN — Medication 2 MILLIGRAM(S): at 05:39

## 2022-02-01 RX ADMIN — DIVALPROEX SODIUM 1000 MILLIGRAM(S): 500 TABLET, DELAYED RELEASE ORAL at 20:08

## 2022-02-01 RX ADMIN — Medication 1 APPLICATION(S): at 20:08

## 2022-02-01 NOTE — PROGRESS NOTE BEHAVIORAL HEALTH - NSBHFUPINTERVALHXFT_PSY_A_CORE
Pt was seen, evaluated, chart reviewed. As per nursing staff, this morning pt was banging on the glass, demanding the haldol injection and demanding to leave, requiring PO PRN. On evaluation, pt reports she "just wanted the injection." Pt was calmer and more re-directable. Is compliant with medication, denies negative side effects. Agreeable to receive haldol decanoate injection today. Endorsed good sleep and appetite. Denied auditory/visual hallucinations. Denied paranoia. Denied suicidal/homicidal ideation, intent or plan.

## 2022-02-01 NOTE — PROGRESS NOTE BEHAVIORAL HEALTH - NSBHCHARTREVIEWVS_PSY_A_CORE FT
ICU Vital Signs Last 24 Hrs  T(C): 35.7 (01 Feb 2022 09:28), Max: 36.1 (31 Jan 2022 16:04)  T(F): 96.3 (01 Feb 2022 09:28), Max: 97 (31 Jan 2022 16:04)  HR: 101 (01 Feb 2022 09:28) (98 - 101)  BP: 166/74 (01 Feb 2022 09:28) (127/76 - 166/74)  BP(mean): --  ABP: --  ABP(mean): --  RR: 16 (01 Feb 2022 09:28) (16 - 20)  SpO2: --

## 2022-02-02 LAB — SARS-COV-2 RNA SPEC QL NAA+PROBE: SIGNIFICANT CHANGE UP

## 2022-02-02 PROCEDURE — 99231 SBSQ HOSP IP/OBS SF/LOW 25: CPT

## 2022-02-02 RX ORDER — SENNA PLUS 8.6 MG/1
2 TABLET ORAL
Qty: 28 | Refills: 0
Start: 2022-02-02 | End: 2022-02-15

## 2022-02-02 RX ORDER — BENZTROPINE MESYLATE 1 MG
2 TABLET ORAL
Qty: 0 | Refills: 0 | DISCHARGE

## 2022-02-02 RX ORDER — HALOPERIDOL DECANOATE 100 MG/ML
150 INJECTION INTRAMUSCULAR
Qty: 0 | Refills: 0 | DISCHARGE

## 2022-02-02 RX ORDER — LEVOTHYROXINE SODIUM 125 MCG
1 TABLET ORAL
Qty: 0 | Refills: 0 | DISCHARGE

## 2022-02-02 RX ORDER — OLANZAPINE 15 MG/1
1 TABLET, FILM COATED ORAL
Qty: 14 | Refills: 0
Start: 2022-02-02 | End: 2022-02-15

## 2022-02-02 RX ORDER — DIPHENHYDRAMINE HCL 50 MG
25 CAPSULE ORAL
Qty: 0 | Refills: 0 | DISCHARGE

## 2022-02-02 RX ORDER — DIVALPROEX SODIUM 500 MG/1
2 TABLET, DELAYED RELEASE ORAL
Qty: 56 | Refills: 0
Start: 2022-02-02 | End: 2022-02-15

## 2022-02-02 RX ORDER — BENZTROPINE MESYLATE 1 MG
1 TABLET ORAL
Qty: 14 | Refills: 0
Start: 2022-02-02 | End: 2022-02-15

## 2022-02-02 RX ORDER — NICOTINE POLACRILEX 2 MG
1 GUM BUCCAL
Qty: 14 | Refills: 0
Start: 2022-02-02 | End: 2022-02-15

## 2022-02-02 RX ORDER — LEVOTHYROXINE SODIUM 125 MCG
1 TABLET ORAL
Qty: 14 | Refills: 0
Start: 2022-02-02 | End: 2022-02-15

## 2022-02-02 RX ORDER — FAMOTIDINE 10 MG/ML
1 INJECTION INTRAVENOUS
Qty: 28 | Refills: 0
Start: 2022-02-02 | End: 2022-02-15

## 2022-02-02 RX ORDER — DIVALPROEX SODIUM 500 MG/1
1000 TABLET, DELAYED RELEASE ORAL
Qty: 0 | Refills: 0 | DISCHARGE

## 2022-02-02 RX ORDER — GABAPENTIN 400 MG/1
300 CAPSULE ORAL
Qty: 0 | Refills: 0 | DISCHARGE

## 2022-02-02 RX ADMIN — Medication 2 MILLIGRAM(S): at 15:00

## 2022-02-02 RX ADMIN — Medication 50 MICROGRAM(S): at 06:25

## 2022-02-02 RX ADMIN — Medication 1 PATCH: at 08:00

## 2022-02-02 RX ADMIN — Medication 1 APPLICATION(S): at 20:38

## 2022-02-02 RX ADMIN — Medication 1 PATCH: at 08:17

## 2022-02-02 RX ADMIN — Medication 1 APPLICATION(S): at 08:16

## 2022-02-02 RX ADMIN — Medication 1 PATCH: at 18:09

## 2022-02-02 RX ADMIN — Medication 2 MILLIGRAM(S): at 12:08

## 2022-02-02 RX ADMIN — DIVALPROEX SODIUM 1000 MILLIGRAM(S): 500 TABLET, DELAYED RELEASE ORAL at 08:16

## 2022-02-02 RX ADMIN — Medication 2 MILLIGRAM(S): at 08:16

## 2022-02-02 RX ADMIN — Medication 2 MILLIGRAM(S): at 19:30

## 2022-02-02 RX ADMIN — OLANZAPINE 5 MILLIGRAM(S): 15 TABLET, FILM COATED ORAL at 20:38

## 2022-02-02 RX ADMIN — FAMOTIDINE 20 MILLIGRAM(S): 10 INJECTION INTRAVENOUS at 20:38

## 2022-02-02 RX ADMIN — SENNA PLUS 2 TABLET(S): 8.6 TABLET ORAL at 20:38

## 2022-02-02 RX ADMIN — DIVALPROEX SODIUM 1000 MILLIGRAM(S): 500 TABLET, DELAYED RELEASE ORAL at 20:38

## 2022-02-02 RX ADMIN — Medication 1 PATCH: at 08:15

## 2022-02-02 RX ADMIN — Medication 2 MILLIGRAM(S): at 08:17

## 2022-02-02 RX ADMIN — FAMOTIDINE 20 MILLIGRAM(S): 10 INJECTION INTRAVENOUS at 08:16

## 2022-02-02 NOTE — PROGRESS NOTE BEHAVIORAL HEALTH - MOOD
Anxious/Irritable
Normal
Anxious
Anxious/Irritable
Normal
Anxious/Irritable/Other
Normal
Anxious/Irritable/Other
Normal
Normal
Anxious/Irritable/Other
Normal
Normal/Anxious/Irritable

## 2022-02-02 NOTE — DISCHARGE NOTE BEHAVIORAL HEALTH - NSTOBACCOREFERRAL_GEN_A_NCS
Patient declined information Patient registered and referred to the [place] [facility]. Phone appointment scheduled for 2/5/22 at 0900./Yes

## 2022-02-02 NOTE — PROGRESS NOTE BEHAVIORAL HEALTH - AFFECT RANGE
Constricted
Constricted
Labile
Full
Labile
Labile
Constricted
Constricted
Labile

## 2022-02-02 NOTE — DISCHARGE NOTE BEHAVIORAL HEALTH - NSBHDCMEDSFT_PSY_A_CORE
Depakote er transitioned to depakote dr and titrated depakote dr 1000 mg bid, valproic acid level 92. Started on zyprexa 5 mg qhs. Discontinued gabapentin 300 gm qhs. Continued with home medications- haldol decanoate 150 mg q4 weeks (last dose given 2/1/22), benztropine 2 mg daily. Pt tolerated medications well, denied negative side effects.

## 2022-02-02 NOTE — DISCHARGE NOTE BEHAVIORAL HEALTH - NSBHDCLABSFT_PSY_A_CORE
Continue to monitor valproic acid level, EKG, weight, BP, CBC, CMP, Hemoglobin A1c, fasting glucose and Lipid profile.

## 2022-02-02 NOTE — DISCHARGE NOTE BEHAVIORAL HEALTH - NSBHDCVIOLFCTRSFT_PSY_A_CORE
Affective dysregulation, medication/treatment nonadherence, substance abuse, poor distress tolerance

## 2022-02-02 NOTE — DISCHARGE NOTE BEHAVIORAL HEALTH - NSBHPSYCHMEDOTHERFT_PSY_A_CORE
Pt with hx of multiple failed monotherapy trials (haldol, zyprexa, risperdal), refuses lab monitoring requirements of clozapine

## 2022-02-02 NOTE — PROGRESS NOTE BEHAVIORAL HEALTH - NS ED BHA MSE SPEECH VOLUME
Normal
Normal
Normal/Other
Normal
Normal
Normal/Other
Normal
Normal/Other
Normal/Other
Loud
Normal
Normal/Other
Normal

## 2022-02-02 NOTE — PROGRESS NOTE BEHAVIORAL HEALTH - AFFECT QUALITY
Elevated/Irritable/Anxious
Euthymic
Elevated/Irritable/Anxious
Elevated/Irritable/Anxious
Elevated/Irritable
Elevated/Irritable/Other
Euthymic
Elevated/Irritable/Anxious
Elevated/Irritable/Anxious
Euthymic
Elevated/Anxious

## 2022-02-02 NOTE — PROGRESS NOTE BEHAVIORAL HEALTH - SUMMARY
37 y/o single domiciled unemployed woman with past medical history of hypothyroidism and past psychiatric history of schizoaffective disorder bipolar type who was brought in by ambulance at the advice of her psychiatrist for increasingly erratic and manic behavior in the setting of possible medication non-compliance, found to be acutely manic and disorganized. Her presentation appears consistent with decompensation of schizoaffective d/o bipolar type in setting of possible medication nonadherence. Pt will likely benefit from IPP at this time. On evaluation, pt presents calm and cooperative, endorses good mood and sleep, more verbally redirectable with improved behavioral control. Discharge planning started.    #Schizoaffective d/o, bipolar type  - Continue depakote 1000 mg bid (1/24), vpa level on 1/28 is 97.0 (1/28/22)  -c/w haldol dec 150 mg q 4 weeks (last dose given 2/1/22, next dose due 3/1/22)  -c/w benztropine 2 mg daily  -c/w zyprexa 5 mg qhs (1/25)    #Hypothyroidism  -c/w synthroid 50 mcg daily    #Nicotine dependence   - Continue nicotine patch 21 mg qd     #Constipation  -start senna 2 mg qhs  -start miralax 17g daily prn    #Agitation  -for agitation not amenable to verbal redirection, may give zyprexa 5 mg q6h prn with escalation to IM if pt is a danger to self or/and others with repeat EKG to ensure QTc <500 ms

## 2022-02-02 NOTE — PROGRESS NOTE BEHAVIORAL HEALTH - NSBHCHARTREVIEWLAB_PSY_A_CORE FT
Valproic Acid Level, Serum (01.23.22 @ 08:00) Valproic Acid Level, Serum: 72.0 ug/mL   Complete Blood Count + Automated Diff (01.17.22 @ 17:12)   WBC Count: 7.75 K/uL   RBC Count: 5.19 M/uL   Hemoglobin: 16.0 g/dL   Hematocrit: 48.4 %   Mean Cell Volume: 93.3 fL   Mean Cell Hemoglobin: 30.8 pg   Mean Cell Hemoglobin Conc: 33.1 g/dL   Red Cell Distrib Width: 12.2 %   Platelet Count - Automated: 229 K/uL   Auto Neutrophil #: 3.43 K/uL   Auto Lymphocyte #: 3.34 K/uL   Auto Monocyte #: 0.86 K/uL   Auto Eosinophil #: 0.07 K/uL   Auto Basophil #: 0.03 K/uL   Auto Neutrophil %: 44.2  Auto Lymphocyte %: 43.1 %   Auto Monocyte %: 11.1 %   Auto Eosinophil %: 0.9 %   Auto Basophil %: 0.4 %   Auto Immature Granulocyte %: 0.3: (Includes meta, myelo and promyelocytes) %   Nucleated RBC: 0 /100 WBCs   Valproic Acid Level, Serum (01.17.22 @ 17:12) Valproic Acid Level, Serum: 76.0 ug/mL   Pregnancy Profile, Urine (01.17.22 @ 17:12) Pregnancy Profile, Urine: Negative  Hepatic Function Panel (01.17.22 @ 22:14)   Indirect Reacting Bilirubin: >0.2 mg/dL   Protein Total, Serum: 7.8 g/dL   Albumin, Serum: 5.0 g/dL   Bilirubin Total, Serum: 0.4 mg/dL   Bilirubin Direct, Serum: <0.2 mg/dL   Alkaline Phosphatase, Serum: 70 U/L   Aspartate Aminotransferase (AST/SGOT): 29 U/L   Alanine Aminotransferase (ALT/SGPT): 28 U/L
Valproic Acid Level, Serum: 97.0 ug/mL (01.28.22 @ 08:57)
Valproic Acid Level, Serum (01.28.22 @ 08:57) Valproic Acid Level, Serum: 97.0 ug/mL   Hepatic Function Panel in AM (01.21.22 @ 09:53)   Indirect Reacting Bilirubin: >0.1 mg/dL   Protein Total, Serum: 7.1 g/dL   Albumin, Serum: 4.4 g/dL   Bilirubin Total, Serum: 0.3 mg/dL   Bilirubin Direct, Serum: <0.2 mg/dL   Alkaline Phosphatase, Serum: 60 U/L   Aspartate Aminotransferase (AST/SGOT): 20 U/L   Alanine Aminotransferase (ALT/SGPT): 16 U/L   Pregnancy Profile, Urine (01.17.22 @ 17:12) Pregnancy Profile, Urine: Negative  Complete Blood Count + Automated Diff (01.17.22 @ 17:12)   WBC Count: 7.75 K/uL   RBC Count: 5.19 M/uL   Hemoglobin: 16.0 g/dL   Hematocrit: 48.4 %   Mean Cell Volume: 93.3 fL   Mean Cell Hemoglobin: 30.8 pg   Mean Cell Hemoglobin Conc: 33.1 g/dL   Red Cell Distrib Width: 12.2 %   Platelet Count - Automated: 229 K/uL   Auto Neutrophil #: 3.43 K/uL   Auto Lymphocyte #: 3.34 K/uL   Auto Monocyte #: 0.86 K/uL   Auto Eosinophil #: 0.07 K/uL   Auto Basophil #: 0.03 K/uL   Auto Neutrophil %: 44.2  Auto Lymphocyte %: 43.1 %   Auto Monocyte %: 11.1 %   Auto Eosinophil %: 0.9 %   Auto Basophil %: 0.4 %   Auto Immature Granulocyte %: 0.3: (Includes meta, myelo and promyelocytes) %   Nucleated RBC: 0 /100 WBCs   Basic Metabolic Panel (01.17.22 @ 17:12)   Sodium, Serum: 137 mmol/L   Potassium, Serum: 3.9 mmol/L   Chloride, Serum: 100 mmol/L   Carbon Dioxide, Serum: 21 mmol/L   Anion Gap, Serum: 16 mmol/L   Blood Urea Nitrogen, Serum: 12 mg/dL   Creatinine, Serum: 0.8 mg/dL   Glucose, Serum: 88 mg/dL   Calcium, Total Serum: 9.8 mg/dL   eGFR if Non : 94
Complete Blood Count + Automated Diff (01.17.22 @ 17:12)   WBC Count: 7.75 K/uL   RBC Count: 5.19 M/uL   Hemoglobin: 16.0 g/dL   Hematocrit: 48.4 %   Mean Cell Volume: 93.3 fL   Mean Cell Hemoglobin: 30.8 pg   Mean Cell Hemoglobin Conc: 33.1 g/dL   Red Cell Distrib Width: 12.2 %   Platelet Count - Automated: 229 K/uL   Auto Neutrophil #: 3.43 K/uL   Auto Lymphocyte #: 3.34 K/uL   Auto Monocyte #: 0.86 K/uL   Auto Eosinophil #: 0.07 K/uL   Auto Basophil #: 0.03 K/uL   Auto Neutrophil %: 44.2  Auto Lymphocyte %: 43.1 %   Auto Monocyte %: 11.1 %   Auto Eosinophil %: 0.9 %   Auto Basophil %: 0.4 %   Auto Immature Granulocyte %: 0.3: (Includes meta, myelo and promyelocytes) %   Nucleated RBC: 0 /100 WBCs   Valproic Acid Level, Serum (01.17.22 @ 17:12) Valproic Acid Level, Serum: 76.0 ug/mL   Pregnancy Profile, Urine (01.17.22 @ 17:12) Pregnancy Profile, Urine: Negative  Hepatic Function Panel (01.17.22 @ 22:14)   Indirect Reacting Bilirubin: >0.2 mg/dL   Protein Total, Serum: 7.8 g/dL   Albumin, Serum: 5.0 g/dL   Bilirubin Total, Serum: 0.4 mg/dL   Bilirubin Direct, Serum: <0.2 mg/dL   Alkaline Phosphatase, Serum: 70 U/L   Aspartate Aminotransferase (AST/SGOT): 29 U/L   Alanine Aminotransferase (ALT/SGPT): 28 U/L
Valproic Acid Level, Serum (01.23.22 @ 08:00) Valproic Acid Level, Serum: 72.0 ug/mL   Complete Blood Count + Automated Diff (01.17.22 @ 17:12)   WBC Count: 7.75 K/uL   RBC Count: 5.19 M/uL   Hemoglobin: 16.0 g/dL   Hematocrit: 48.4 %   Mean Cell Volume: 93.3 fL   Mean Cell Hemoglobin: 30.8 pg   Mean Cell Hemoglobin Conc: 33.1 g/dL   Red Cell Distrib Width: 12.2 %   Platelet Count - Automated: 229 K/uL   Auto Neutrophil #: 3.43 K/uL   Auto Lymphocyte #: 3.34 K/uL   Auto Monocyte #: 0.86 K/uL   Auto Eosinophil #: 0.07 K/uL   Auto Basophil #: 0.03 K/uL   Auto Neutrophil %: 44.2  Auto Lymphocyte %: 43.1 %   Auto Monocyte %: 11.1 %   Auto Eosinophil %: 0.9 %   Auto Basophil %: 0.4 %   Auto Immature Granulocyte %: 0.3: (Includes meta, myelo and promyelocytes) %   Nucleated RBC: 0 /100 WBCs   Valproic Acid Level, Serum (01.17.22 @ 17:12) Valproic Acid Level, Serum: 76.0 ug/mL   Pregnancy Profile, Urine (01.17.22 @ 17:12) Pregnancy Profile, Urine: Negative  Hepatic Function Panel (01.17.22 @ 22:14)   Indirect Reacting Bilirubin: >0.2 mg/dL   Protein Total, Serum: 7.8 g/dL   Albumin, Serum: 5.0 g/dL   Bilirubin Total, Serum: 0.4 mg/dL   Bilirubin Direct, Serum: <0.2 mg/dL   Alkaline Phosphatase, Serum: 70 U/L   Aspartate Aminotransferase (AST/SGOT): 29 U/L   Alanine Aminotransferase (ALT/SGPT): 28 U/L
Complete Blood Count + Automated Diff (01.17.22 @ 17:12)   WBC Count: 7.75 K/uL   RBC Count: 5.19 M/uL   Hemoglobin: 16.0 g/dL   Hematocrit: 48.4 %   Mean Cell Volume: 93.3 fL   Mean Cell Hemoglobin: 30.8 pg   Mean Cell Hemoglobin Conc: 33.1 g/dL   Red Cell Distrib Width: 12.2 %   Platelet Count - Automated: 229 K/uL   Auto Neutrophil #: 3.43 K/uL   Auto Lymphocyte #: 3.34 K/uL   Auto Monocyte #: 0.86 K/uL   Auto Eosinophil #: 0.07 K/uL   Auto Basophil #: 0.03 K/uL   Auto Neutrophil %: 44.2  Auto Lymphocyte %: 43.1 %   Auto Monocyte %: 11.1 %   Auto Eosinophil %: 0.9 %   Auto Basophil %: 0.4 %   Auto Immature Granulocyte %: 0.3: (Includes meta, myelo and promyelocytes) %   Nucleated RBC: 0 /100 WBCs   Valproic Acid Level, Serum (01.17.22 @ 17:12) Valproic Acid Level, Serum: 76.0 ug/mL   Pregnancy Profile, Urine (01.17.22 @ 17:12) Pregnancy Profile, Urine: Negative  Hepatic Function Panel (01.17.22 @ 22:14)   Indirect Reacting Bilirubin: >0.2 mg/dL   Protein Total, Serum: 7.8 g/dL   Albumin, Serum: 5.0 g/dL   Bilirubin Total, Serum: 0.4 mg/dL   Bilirubin Direct, Serum: <0.2 mg/dL   Alkaline Phosphatase, Serum: 70 U/L   Aspartate Aminotransferase (AST/SGOT): 29 U/L   Alanine Aminotransferase (ALT/SGPT): 28 U/L
Valproic Acid Level, Serum (01.23.22 @ 08:00) Valproic Acid Level, Serum: 72.0 ug/mL   Complete Blood Count + Automated Diff (01.17.22 @ 17:12)   WBC Count: 7.75 K/uL   RBC Count: 5.19 M/uL   Hemoglobin: 16.0 g/dL   Hematocrit: 48.4 %   Mean Cell Volume: 93.3 fL   Mean Cell Hemoglobin: 30.8 pg   Mean Cell Hemoglobin Conc: 33.1 g/dL   Red Cell Distrib Width: 12.2 %   Platelet Count - Automated: 229 K/uL   Auto Neutrophil #: 3.43 K/uL   Auto Lymphocyte #: 3.34 K/uL   Auto Monocyte #: 0.86 K/uL   Auto Eosinophil #: 0.07 K/uL   Auto Basophil #: 0.03 K/uL   Auto Neutrophil %: 44.2  Auto Lymphocyte %: 43.1 %   Auto Monocyte %: 11.1 %   Auto Eosinophil %: 0.9 %   Auto Basophil %: 0.4 %   Auto Immature Granulocyte %: 0.3: (Includes meta, myelo and promyelocytes) %   Nucleated RBC: 0 /100 WBCs   Valproic Acid Level, Serum (01.17.22 @ 17:12) Valproic Acid Level, Serum: 76.0 ug/mL   Pregnancy Profile, Urine (01.17.22 @ 17:12) Pregnancy Profile, Urine: Negative  Hepatic Function Panel (01.17.22 @ 22:14)   Indirect Reacting Bilirubin: >0.2 mg/dL   Protein Total, Serum: 7.8 g/dL   Albumin, Serum: 5.0 g/dL   Bilirubin Total, Serum: 0.4 mg/dL   Bilirubin Direct, Serum: <0.2 mg/dL   Alkaline Phosphatase, Serum: 70 U/L   Aspartate Aminotransferase (AST/SGOT): 29 U/L   Alanine Aminotransferase (ALT/SGPT): 28 U/L

## 2022-02-02 NOTE — DISCHARGE NOTE BEHAVIORAL HEALTH - MEDICATION SUMMARY - MEDICATIONS TO STOP TAKING
I will STOP taking the medications listed below when I get home from the hospital:    gabapentin 300 mg oral capsule  -- 300 milligram(s) by mouth once a day (at bedtime)

## 2022-02-02 NOTE — PROGRESS NOTE BEHAVIORAL HEALTH - PRN MEDS
haldol, ativan
ativan, benadryl
haldol, ativan
haldol, ativan, hydroxyzine
benadryl, ativan
haldol, ativan, hydroxyzine
haldol, ativan

## 2022-02-02 NOTE — DISCHARGE NOTE BEHAVIORAL HEALTH - NSBHDCPURPOSE1FT_PSY_A_CORE
History of Present Illness





- Stated complaint


Stated Complaint: DIZZY





- Chief complaint


Chief Complaint: Neuro





- History obtained from


History obtained from: Patient





- Additonal information


Additional information: 


75-year-old female presents the emergency department for evaluation of 

persistent dizziness for about 1 week.  She states that she has a difficult time

walking a straight line and is having to hold onto the wall to walk.  She was 

seen for similar approximately 1 week ago and at that time it was felt that her 

dizziness was likely secondary to peripheral cause that she had history of posi

tional vertigo in the past.  However since then the dizziness has not improved 

and has in fact worsened.  It is present at all times and not necessarily worse 

with movement.  She also feels that she is more dyspneic than normal. Patient 

reports that her blood sugars are typically in the 180s at home but this 

afternoon they were 330.  She denies that she has missed any of her recent 

insulin doses. 





Denies CP, leg swelling, no cough, no n/v/d.  no abdominal pain.  she denies 

focal arm or leg weakness, no slurred speech or facial droop.  





She does have a history of coronary artery disease, hypertension, type 2 

diabetes and hyperlipidemia.  She underwent stent placement in July through her 

cardiologist at [place].





Meds allopurinol 150 daily, amlodipine 5 mg daily, aspirin 81 mg daily, ch

lorothalidone 25 mg daily, Plavix 75 mg daily, aspart via sliding scale, insulin

glargine 30 units twice daily, Crestor 40 mg daily, Aldactone 25 mg 

daily,dulaglutide 1.5 mg once weekly, tolterodine 4mg qd











PD PAST MEDICAL HISTORY





- Past Medical History


Cardiovascular: Hypertension, High cholesterol, Peripheral Vascular Disease


Respiratory: Shortness of breath


Neuro: None


Endocrine/Autoimmune: Type 2 diabetes


GI: Colon polyps


: Incontinence


HEENT: Chronic vision loss, Other


Psych: Depression


Musculoskeletal: Osteoarthritis, Gout, Chronic back pain


Derm: Other





- Past Surgical History


Past Surgical History: Yes


General: Cholecystectomy, Appendectomy, Other





- Present Medications


Home Medications: 


                                Ambulatory Orders











 Medication  Instructions  Recorded  Confirmed


 


Atenolol 50 mg PO DAILY 03/14/14 05/22/20


 


Insulin Aspart (Vial) [NovoLOG] 15 - 40 units SUBQ TIDWM 03/14/14 05/22/20


 


Gabapentin 300 mg PO QPM 12/31/15 05/22/20


 


Losartan [Cozaar] 50 mg PO DAILY 12/31/15 05/22/20


 


Spironolactone 25 mg PO DAILY 12/31/15 05/22/20


 


allopurinoL [Allopurinol] 150 mg PO DAILY 12/31/15 05/22/20


 


Insulin Glargine [Lantus Solostar] 30 units SQ BID 05/31/16 05/22/20


 


Tolterodine Tartrate [Tolterodine 4 mg PO DAILY 07/15/19 05/22/20





Tartrate ER]   


 


Triamcinolone 0.1% Cream [Kenalog 1 applic TOP BID 07/15/19 05/22/20





0.1% Cream]   


 


Ascorbic Acid [Vitamin C] 500 mg PO DAILY 05/22/20 05/22/20


 


Atorvastatin Calcium 20 mg PO DAILY 05/22/20 05/22/20


 


Dulaglutide [Trulicity] 1.5 mg SQ OAW 05/22/20 05/22/20


 


Glucagon,Human Recombinant 1 mg IJ PRN PRN 05/22/20 05/22/20





[Glucagon Emergency Kit]   


 


Vitamin E 400 unit PO DAILY 05/22/20 05/22/20


 


Meclizine HCl 25 mg PO Q6HR PRN #20 tablet 09/29/20 














- Allergies


Allergies/Adverse Reactions: 


                                    Allergies











Allergy/AdvReac Type Severity Reaction Status Date / Time


 


succinylcholine Allergy Unknown Respiratory Verified 10/06/20 17:44


 


losartan [From Cozaar] Allergy  Unknown Verified 10/06/20 17:44


 


tramadol Allergy  Unknown Verified 10/06/20 17:44


 


bacitracin AdvReac Unknown Rash Verified 10/06/20 17:44





[From Neosporin     





([id number])]     


 


bacitracin zinc * AdvReac Unknown Rash Verified 10/06/20 17:44





[From Neosporin     





([id number])]     


 


diphenhydramine HCl * AdvReac Unknown Edema Verified 10/06/20 17:44





[From Benadryl]     


 


neomycin sulfate * AdvReac Unknown Rash Verified 10/06/20 17:44





[From Neosporin     





([id number])]     


 


polymyxin B AdvReac Unknown Rash Verified 10/06/20 17:44





[From Neosporin     





([id number])]     


 


Sulfa (Sulfonamide AdvReac Unknown Rash Verified 10/06/20 17:44





Antibiotics)     














- Social History


Does the pt smoke?: No


Smoking Status: Never smoker


Does the pt drink ETOH?: No


Does the pt have substance abuse?: No





- Immunizations


Immunizations are current?: Yes





- POLST


Patient has POLST: No





PD ED PE EXPANDED





- General


General: Alert, No acute distress, Well developed/nourished, Other (obese)





- HEENT


HEENT: Atraumatic, PERRL, Dry mucous membranes





- Neck


Neck: Supple w/out meningeal sx.  No: Adenopathy, No tenderness





- Cardiac


Cardiac: Regular Rate, Murmur Present, Radial strong equal, Pedal strong equal, 

Cap refill < 2 sec





- Respiratory


Respiratory: Clear to ausultation nikolay, Distress, Labored, Other (tachypnea)





- Abdomen


Abdomen: Normal Bowel sounds.  No: Tender to palpation





- Extremities


Extremities: Normal.  No: Pedal edema R, Pedal edema L, Pedal edema bilateral, 

Right calf TTP/cord, Left calf TTP/cord





- Neuro


Neuro: Alert and Oriented X 3, CNII-XII intact, PERRL, Cerebellar nl, Normal 

finger nose, Normal speech.  No: Nystagmus





- GCS


Eye Opening: Spontaneous


Motor: Obeys Commands


Verbal: Oriented


Total: 15





Results





- Vitals


Vitals: 


                               Vital Signs - 24 hr











  10/06/20 10/06/20 10/06/20





  17:45 19:47 21:22


 


Temperature 37.0 C 37 C 37 C


 


Heart Rate 81 74 75


 


Respiratory 18 25 H 18





Rate   


 


Blood Pressure 141/59 H 125/95 H 152/55 H


 


O2 Saturation 99 100 97








                                     Oxygen











O2 Source                      Room air

















- EKG (time done)


  ** 1838


Rate: Rate (enter#) (75)


Rhythm: NSR


Axis: Normal


Intervals: RBBB


QRS: Normal


Ischemia: Normal ST segments


Compare to prior EKG: Unchanged from prior EKG


Computer interpretation: Agree with computer





- Labs


Labs: 


                                Laboratory Tests











  10/06/20 10/06/20 10/06/20





  16:09 16:09 18:09


 


WBC    10.3


 


RBC    4.96


 


Hgb    14.7


 


Hct    42.2


 


MCV    85.1


 


MCH    29.6


 


MCHC    34.8


 


RDW    13.0


 


Plt Count    299


 


MPV    10.2


 


Neut # (Auto)    7.6 H


 


Lymph # (Auto)    1.5


 


Mono # (Auto)    1.0


 


Eos # (Auto)    0.1


 


Baso # (Auto)    0.1


 


Absolute Nucleated RBC    0.00


 


Nucleated RBC %    0.0


 


PT   


 


INR   


 


APTT   


 


Sodium   


 


Potassium   


 


Chloride   


 


Carbon Dioxide   


 


Anion Gap   


 


BUN   


 


Creatinine   


 


Estimated GFR (MDRD)   


 


Glucose   


 


Calcium   


 


Total Bilirubin   


 


AST   


 


ALT   


 


Alkaline Phosphatase   


 


Troponin I High Sens   


 


B-Natriuretic Peptide  18  


 


Total Protein   


 


Albumin   


 


Globulin   


 


Albumin/Globulin Ratio   


 


Lipase   


 


Urine Color   


 


Urine Clarity   


 


Urine pH   


 


Ur Specific Gravity   


 


Urine Protein   


 


Urine Glucose (UA)   


 


Urine Ketones   


 


Urine Occult Blood   


 


Urine Nitrite   


 


Urine Bilirubin   


 


Urine Urobilinogen   


 


Ur Leukocyte Esterase   


 


Urine RBC   


 


Urine WBC   


 


Ur Squamous Epith Cells   


 


Urine Bacteria   


 


Urine Casts   


 


Ur Microscopic Review   


 


Urine Culture Comments   


 


Serum Ketones   NEGATIVE 














  10/06/20 10/06/20 10/06/20





  18:09 18:09 18:09


 


WBC   


 


RBC   


 


Hgb   


 


Hct   


 


MCV   


 


MCH   


 


MCHC   


 


RDW   


 


Plt Count   


 


MPV   


 


Neut # (Auto)   


 


Lymph # (Auto)   


 


Mono # (Auto)   


 


Eos # (Auto)   


 


Baso # (Auto)   


 


Absolute Nucleated RBC   


 


Nucleated RBC %   


 


PT  13.1 H  


 


INR  1.2  


 


APTT  22.4 L  


 


Sodium   126 L 


 


Potassium   4.4 


 


Chloride   89 L 


 


Carbon Dioxide   23 


 


Anion Gap   14.0 H 


 


BUN   45 H 


 


Creatinine   1.7 H 


 


Estimated GFR (MDRD)   29 L 


 


Glucose   418 H 


 


Calcium   10.3 


 


Total Bilirubin   1.2 H 


 


AST   20 


 


ALT   18 


 


Alkaline Phosphatase   79 


 


Troponin I High Sens    12.2


 


B-Natriuretic Peptide   


 


Total Protein   8.0 


 


Albumin   4.2 


 


Globulin   3.8 


 


Albumin/Globulin Ratio   1.1 


 


Lipase   57 H 


 


Urine Color   


 


Urine Clarity   


 


Urine pH   


 


Ur Specific Gravity   


 


Urine Protein   


 


Urine Glucose (UA)   


 


Urine Ketones   


 


Urine Occult Blood   


 


Urine Nitrite   


 


Urine Bilirubin   


 


Urine Urobilinogen   


 


Ur Leukocyte Esterase   


 


Urine RBC   


 


Urine WBC   


 


Ur Squamous Epith Cells   


 


Urine Bacteria   


 


Urine Casts   


 


Ur Microscopic Review   


 


Urine Culture Comments   


 


Serum Ketones   














  10/06/20 10/06/20





  18:26 21:10


 


WBC  


 


RBC  


 


Hgb  


 


Hct  


 


MCV  


 


MCH  


 


MCHC  


 


RDW  


 


Plt Count  


 


MPV  


 


Neut # (Auto)  


 


Lymph # (Auto)  


 


Mono # (Auto)  


 


Eos # (Auto)  


 


Baso # (Auto)  


 


Absolute Nucleated RBC  


 


Nucleated RBC %  


 


PT  


 


INR  


 


APTT  


 


Sodium   132 L


 


Potassium   3.6


 


Chloride   99 L


 


Carbon Dioxide   21


 


Anion Gap   12.0


 


BUN   41 H


 


Creatinine   1.5 H


 


Estimated GFR (MDRD)   34 L


 


Glucose   232 H


 


Calcium   9.3


 


Total Bilirubin   0.8


 


AST   18


 


ALT   17


 


Alkaline Phosphatase   69


 


Troponin I High Sens  


 


B-Natriuretic Peptide  


 


Total Protein   7.0


 


Albumin   3.9


 


Globulin   3.1


 


Albumin/Globulin Ratio   1.3


 


Lipase   55 H


 


Urine Color  YELLOW 


 


Urine Clarity  CLEAR 


 


Urine pH  7.5 


 


Ur Specific Gravity  1.020 


 


Urine Protein  100 H 


 


Urine Glucose (UA)  250 H 


 


Urine Ketones  NEGATIVE 


 


Urine Occult Blood  TRACE-INTA 


 


Urine Nitrite  NEGATIVE 


 


Urine Bilirubin  NEGATIVE 


 


Urine Urobilinogen  1 (NORMAL) 


 


Ur Leukocyte Esterase  NEGATIVE 


 


Urine RBC  0-5 


 


Urine WBC  0-3 


 


Ur Squamous Epith Cells  MOD Squamous H 


 


Urine Bacteria  None Seen 


 


Urine Casts  3-5 Hyaline Casts 


 


Ur Microscopic Review  INDICATED 


 


Urine Culture Comments  NOT INDICATED 


 


Serum Ketones  














- Rads (name of study)


  ** CXR


Radiology: Final report received (No acute cardiopulmonary abnormality.)





PD MEDICAL DECISION MAKING





- ED course


Complexity details: reviewed results, re-evaluated patient, considered 

differential, d/w patient


ED course: 


5-year-old diabetic female presents to the emergency department with 

approximately 1 week of vertigo.  Seen recently for similar and thought to have 

positional vertigo.  On exam she did not have any gait alteration and no focal 

neuro deficits.  Initially her labs revealed some likely dehydration with acute 

creatinine rise to 1.7 and a mild gap.  She is noted to have been hyponatremic 

at 126 with a serum glucose greater than 400.  Her serum ketones were negative. 

Urine was not c/w infectionChest x-ray showed no acute focal infiltrates but she

did not have good cephalization of the vessels.  Initially I felt that her exam 

is more consistent with an mild dehydration.  She was given 2 L of IV fluid here

in the emergency department.  Following this she reported that her dizziness had

abated and I did note a very normal ambulation up and down the hallway in the 

emergency department. Repeat chemistry showed an improved sodium at 132 as well 

as a blood glucose in the 270s. It should be noted that her EKG was nonischemic.

 High-sensitivity troponin was negative as well as a BNP which was only 19.





She does have a follow-up appointment scheduled with Dr. Kent in 48 hours. 

Marked improvement in symptoms I feel that she is stable for discharge home.  

Her dehydration may be secondary to the diuretics that she takes for her history

of heart disease.  Advised to drink at least 2 L of fluid a day and take her 

medications with her to the primary office in order to have her meds 

reevaluated.  If at any point her dizziness returns, she develops chest pain she

is to return immediately to the ER








Departure





- Departure


Disposition: 01 Home, Self Care


Clinical Impression: 


 Dehydration, Hyperglycemia, Hyponatremia, Dizziness





Condition: Stable


Record reviewed to determine appropriate education?: Yes


Follow-Up: 


HECTOR KENT MD [Physician No Access] - 


Comments: 


Daniela chavarria were seen today in the emergency department for dizziness.  

Initially when you presented to the emergency department your labs indicated 

that you are likely dehydrated.  You were given 2 L of IV fluid.  Once this was 

completed your dizziness improved quite a bit.  It is very important that you 

drink at least 2 L of water a day.  I would like you to see Dr. Kent in 

follow-up on Thursday as already scheduled.  He should repeat your blood 

chemistry.





Please continue to take your insulins and medications at home as already 

prescribed.  If your blood glucose is greater than 350 mg/dL please return 

immediately to the ER for a second look.  Also please return if your dizziness 

continues, you develop chest pain or feel like you are about to faint
Follow up appointment

## 2022-02-02 NOTE — CHART NOTE - NSCHARTNOTEFT_GEN_A_CORE
Social Work Note:    Treatment team met with patient to discuss treatment plan, medications and discharge plan.  During the day patient is observed to be isolative to be active on unit and participatory in unit activities. . Patient denies SI HI and AVH. Patient in good behavioral control. Patient is slated for discharge tomorrow providing no regressive behaviors and or medical complications.     This worker met with patient to discuss discharge plan.  [facility]. Patient will resume treatment at [facility]. All parties aware and agreeable to same.      Mental Status Exam:    Mood – Neutral  Sleep - Good  Appetite - Good  ADLs - WNL  Thought Process – Linear    Observation – [id number]    No barriers to discharge identified at this time.     Plan is for referral to patient’s community psychiatrist.      At this time patient is not psychiatrically stable for discharge.

## 2022-02-02 NOTE — DISCHARGE NOTE BEHAVIORAL HEALTH - NSBHDCRESPONSEFT_PSY_A_CORE
Pt has made significant progress over the course of hospitalization. With continuous psychotherapy from the treatment team and the medications, patient reports feeling better sleeping and eating well. Thought process and insight improved. Pt was calm and cooperative and was in good behavioral control. Patient denied any suicidal or homicidal ideations. Patient denied any auditory or visual hallucinations. Pt was evaluated by treatment team, pt is stable for discharge and patient shows no imminent danger to self, others or property at this time. Pt understands and agrees with treatment plan and following up with outpatient. Psychoeducation provided regarding diagnosis, medications, treatment and follow up. Risks, benefits, alternatives discussed, all questions and concerns addressed and answered.

## 2022-02-02 NOTE — PROGRESS NOTE BEHAVIORAL HEALTH - THOUGHT PROCESS
Linear
Overinclusive/Tangential/Impaired reasoning/Other
Overinclusive/Tangential/Impaired reasoning/Other
Overinclusive/Tangential/Illogical/Impaired reasoning/Other
Linear
Overinclusive/Tangential/Flight of ideas/Illogical/Impaired reasoning/Disorganized
Overinclusive/Tangential/Illogical/Impaired reasoning/Disorganized
Overinclusive/Tangential/Illogical/Impaired reasoning/Other

## 2022-02-02 NOTE — DISCHARGE NOTE BEHAVIORAL HEALTH - NSBHDCHOUSINGFT_PSY_A_CORE
[facility] TLR 2  [address] [facility] [address] [facility]  [address]  [facility] [address] [facility] TLR 2  [address]  [facility] [address]

## 2022-02-02 NOTE — PROGRESS NOTE BEHAVIORAL HEALTH - NSBHFUPTYPE_PSY_A_CORE
Inpatient
Inpatient-On Service Note
Inpatient

## 2022-02-02 NOTE — PROGRESS NOTE BEHAVIORAL HEALTH - PRIMARY DX
Schizoaffective disorder, bipolar type

## 2022-02-02 NOTE — PROGRESS NOTE BEHAVIORAL HEALTH - NSBHPTASSESSDT_PSY_A_CORE
27-Jan-2022 10:41
01-Feb-2022 10:23
02-Feb-2022 08:45
19-Jan-2022 09:30
25-Jan-2022 09:15
21-Jan-2022 09:00
24-Jan-2022 09:45
26-Jan-2022 09:45
22-Jan-2022 13:01
18-Jan-2022 09:45
31-Jan-2022 09:56
28-Jan-2022 10:45
20-Jan-2022 09:45

## 2022-02-02 NOTE — PROGRESS NOTE BEHAVIORAL HEALTH - REMOTE MEMORY
Normal
Impaired
Normal
Impaired
Impaired
Normal
Impaired
Impaired
Normal
Normal
Impaired

## 2022-02-02 NOTE — PROGRESS NOTE BEHAVIORAL HEALTH - NSBHCHARTREVIEWVS_PSY_A_CORE FT
Vital Signs Last 24 Hrs  T(C): 35.7 (02 Feb 2022 08:30), Max: 35.7 (02 Feb 2022 08:30)  T(F): 96.3 (02 Feb 2022 08:30), Max: 96.3 (02 Feb 2022 08:30)  HR: 101 (02 Feb 2022 08:30) (88 - 101)  BP: 129/88 (02 Feb 2022 08:30) (122/76 - 129/88)  BP(mean): --  RR: 20 (02 Feb 2022 08:30) (20 - 20)  SpO2: --

## 2022-02-02 NOTE — PROGRESS NOTE BEHAVIORAL HEALTH - NSBHATTESTSEENBY_PSY_A_CORE
NP without Attending Psychiatrist
attending Psychiatrist without NP/Trainee
NP without Attending Psychiatrist
NP without Attending Psychiatrist
attending Psychiatrist without NP/Trainee
NP without Attending Psychiatrist
attending Psychiatrist without NP/Trainee
attending Psychiatrist without NP/Trainee
NP without Attending Psychiatrist

## 2022-02-02 NOTE — PROGRESS NOTE BEHAVIORAL HEALTH - RECENT MEMORY
Normal
Impaired
Normal
Impaired
Impaired
Normal
Impaired
Impaired

## 2022-02-02 NOTE — PROGRESS NOTE BEHAVIORAL HEALTH - MUSCLE TONE / STRENGTH
Other
Normal muscle tone/strength
Other
Normal muscle tone/strength

## 2022-02-02 NOTE — PROGRESS NOTE BEHAVIORAL HEALTH - NSBHFUPINTERVALCCFT_PSY_A_CORE
"I just wanted the injection"
"I'm good"
"Sign me out!"
"You give me your address, I clean and cook for you, pay"
"I like you!"
"I like you!"
"I'm fine"
"I go to [facility] now"
"I'm fine"
"Someone stole my $5000 coat and $1000 boots"
"I am going home tomorrow"
"I want to go back to [place]"
"They lied to me, I was supposed to go to [place]"

## 2022-02-02 NOTE — PROGRESS NOTE BEHAVIORAL HEALTH - NS ED BHA MSE SPEECH RATE
Normal
Fast, difficult to interrupt
Normal

## 2022-02-02 NOTE — PROGRESS NOTE BEHAVIORAL HEALTH - NSBHCHARTREVIEWINVESTIGATE_PSY_A_CORE FT
< from: 12 Lead ECG (01.17.22 @ 17:18) >      Ventricular Rate 94 BPM    Atrial Rate 94 BPM    P-R Interval 120 ms    QRS Duration 72 ms    Q-T Interval 356 ms    QTC Calculation(Bazett) 445 ms    P Axis 57 degrees    R Axis 7 degrees    T Axis 5 degrees    Diagnosis Line Normal sinus rhythm  Nonspecific ST-T changes  Abnormal ECG    < end of copied text >
< from: 12 Lead ECG (01.18.22 @ 13:15) >      Ventricular Rate 86 BPM    Atrial Rate 86 BPM    P-R Interval 118 ms    QRS Duration 74 ms    Q-T Interval 366 ms    QTC Calculation(Bazett) 437 ms    P Axis 54 degrees    R Axis 79 degrees    T Axis 13 degrees    Diagnosis Line Normal sinus rhythm  Normal ECG    < end of copied text >

## 2022-02-02 NOTE — PROGRESS NOTE BEHAVIORAL HEALTH - ORIENTED TO PERSON
Please call the [facility] at [phone], select OPTION #2,  if any of your medications change.  This means: if a med is discontinued, a new med is started, or a dose is changed.  These meds may interact with your warfarin and we may need to adjust your dose.  This is especially important if you start any type of ANTIBIOTIC.    Also, please call if you have any admissions to the hospital, visits to an emergency room, procedures planned, or if any other medical provider has instructed you to hold your warfarin.    
Yes

## 2022-02-02 NOTE — PROGRESS NOTE BEHAVIORAL HEALTH - RISK ASSESSMENT
Risk factors include affective dysregulation, disorganization, irritability, h/o medication noandherence. Protective factors include residential stability, social support, established OP providers, h/o good treatment response.

## 2022-02-02 NOTE — DISCHARGE NOTE BEHAVIORAL HEALTH - MEDICATION SUMMARY - MEDICATIONS TO TAKE
I will START or STAY ON the medications listed below when I get home from the hospital:    divalproex sodium 500 mg oral delayed release tablet  -- 2 tab(s) by mouth 2 times a day x 14 days Continue to take as prescribed until told otherwise by your provider  -- Indication: For Schizoaffective disorder    benztropine 2 mg oral tablet  -- 1 tab(s) by mouth once a day x 14 days  Continue to take as prescribed until told otherwise by your provider  -- Indication: For EPS    Haldol Decanoate  -- 150 milligram(s) intramuscular every 4 weeks  Continue to take as prescribed until told otherwise by your provider  Last dose given: 02/01/2022  Next dose due: 03/01/2022  -- Indication: For Schizoaffective disorder    OLANZapine 5 mg oral tablet  -- 1 tab(s) by mouth once a day (at bedtime) x 14 days  Continue to take as prescribed until told otherwise by your provider  -- Indication: For Schizoaffective disorder    famotidine 20 mg oral tablet  -- 1 tab(s) by mouth 2 times a day x 14 days  Continue to take as prescribed until told otherwise by your provider  -- Indication: For GERD    senna oral tablet  -- 2 tab(s) by mouth once a day (at bedtime) x 14 days  Continue to take as prescribed until told otherwise by your provider   -- Indication: For Constipation    nicotine 21 mg/24 hr transdermal film, extended release  -- 1 patch by transdermal patch once a day x 14 days  Continue to take as prescribed until told otherwise by your provider  -- Indication: For Smoking cessation    levothyroxine 50 mcg (0.05 mg) oral tablet  -- 1 tab(s) by mouth once a day x 14 days  Continue to take as prescribed until told otherwise by your provider  -- Indication: For Hypothyroidism

## 2022-02-02 NOTE — DISCHARGE NOTE BEHAVIORAL HEALTH - NSBHDCVIOLFCTROTHERFT_PSY_A_CORE
Patient and provider identified future stressors as being arguments with loved ones, nonadherence with medication/outpatient follow up, relapse with illicit substances, employment instability, financial instability.

## 2022-02-02 NOTE — DISCHARGE NOTE BEHAVIORAL HEALTH - NSBHDCVIOLPROTECTFT_PSY_A_CORE
Supportive OP established treatment team, medication and treatment adherence, future orientation, ability to state reasons for living, improved insight, sobriety, willingness to seek care in moment of crisis

## 2022-02-02 NOTE — DISCHARGE NOTE BEHAVIORAL HEALTH - HPI (INCLUDE ILLNESS QUALITY, SEVERITY, DURATION, TIMING, CONTEXT, MODIFYING FACTORS, ASSOCIATED SIGNS AND SYMPTOMS)
Ms. Blas is a 36 year old single domiciled unemployed woman with past medical history of hypothyroidism and past psychiatric history of schizoaffective disorder bipolar type who was brought in by ambulance at the advice of her psychiatrist for increasingly erratic and manic behavior in the setting of medication non-compliance. On multiple assessments, patient was acutely manic and largely unable to participate in interview.    On first approach, patient is asleep in bed and responsive to touch. She immediately starts talking about [place] upon awakening, however content of speech is not intelligible. Patient demands multiple interpreters at various times (Turkmen, Kyrgyz, Syriac) but is noted to understand and speak English well. She is able to say that she's in [place] and that it is January 2022 but that she doesn't know the date. She identifies herself as "Ashlyn Blas, [place]." Her speech is intermittently garbled and she provides an approximate phone number of her stepmother for collateral.    Stepmother [place] contacted, she is Turkmen speaking and requested her  Jaquan speak in her place. He reports that the patient called them this morning saying that she's in the hospital. He reports that she sounded off, "not normal" and that this happens to her when she stops taking her medication, which she hasn't been taking in a while. She said odd things like "the President says hi to you". He reports that patient "lives with a family who takes care of her and feeds her" but is unable to provide further details of the arrangement. He reports that she hasn't been back to the house in a while and that's why she hasn't been taking her medications. He provides a contact number for the family as [phone]. Jaquan reports that patient's last known inpatient stay was 6 months ago when she went to [facility] near [facility] and called his home repeatedly and was put in the hospital. He notes that when patient is hospitalized and put back on her medication she responds well and appears completely normal. She is not , no children, and not employed.    On reassessment, patient is being escorted back to her room by security for attempting to elope. She is agitated and cursing at staff members. She spills water and coffee on the floor and is preoccupied with cleaning it up. She reports that she lives by herself "with a big doggie". She reports that her mother passed away at age 28 from an issue with her stomach and that her father passed away in 2006. She reports that her psychiatrist recently switched her to Zyprexa (?unclear)    Per [facility] paper chart, patient currently takes:  Haldol Decanoate  mg q 4 weeks, "last dose given 1/4/21" "next due 2/1/21"  Depakote ER 1000 mg PO at 9 PM  Gabapentin 300 mg PO at 9 PM  Benztropine 2 mg PO at 9 AM  Diphenhydramine 25 mg PO 9 PM  Synthroid 50 mcg PO 7 AM  Famotidine 20 mg PO 9 AM/PM (?unclear handwriting)    In IPP,  pt presents disheveled and labile, easily agitated. Of note, pt offered use of Turkmen , she refused at this time. She repeatedly states she was lied to and she was supposed to go to [place]. When attempting to evaluate on events leading to admission, pt repeatedly states "everything is there" and is difficult to redirect back to evaluation questions. She is unable to state her medications, then reports being distressed that she needs her thyroid medications and letting this writer know that she has been taking her depakote. She states she is "fine" and wants to go home, denies changes to her mood despite observed mood lability, often fluctuating between crying and irritated. She reports she is sleeping and eating well. She denies AVH, paranoia. Denies SI/HI, intent and plan. When attempting to evaluate substance use, pt dismissive and becomes preservative on this writer outreaching to her numerous collaterals, unable to be redirected.     Pt gives permission to outreach to her ? Don ([phone]); as per collateral, he is her friend and not , and states she has been taking her medications; and to her stepmother Grace ([phone]) - left VM and callback #    Spoke with pt's OP psychiatrist, Dr. Yan Guevara ([phone]) - confirms pt's medications, states pt has been on current regimen for years with no recent changes, reports when she is adherent to her medication regimen, pt does well. He states 2 weeks ago pt left her  [facility] 2 weeks ago to move in with a new man she has met who she said she is going to  (Obdulio). States pt likely stopped medications at that time which led to pt eviction and return to her  the day of admission. He reports pt with h/o similar episodes, most recently in 7-8/2021, states pt with dreams with getting , getting her citizenship and starting a family. He reports when pt decompensates, pt becomes manic, grandiose and sexually preoccupied. Reports pt to return to [facility] ([phone]) prior to returning to [facility] ([place], [phone]).

## 2022-02-02 NOTE — PROGRESS NOTE BEHAVIORAL HEALTH - NSBHFUPINTERVALHXFT_PSY_A_CORE
Pt seen and evaluated, chart reviewed. As per nursing report, pt observed to sleep overnight with improved behavioral control. On evaluation, pt presents well-groomed, pleasant and calm, reports she doing "good" and is looking forward to discharge. She endorses good sleep and appetite, regular BMs. Denies AVH, paranoia. Denies SI/HI, intent and plan. Pt visible on unit and more verbally re-directable. She is adherent with medications, denies negative side effects; given due haldol dec yesterday with no complications. Discharge planning started.  Pt denies s/sx of Covid-19.

## 2022-02-02 NOTE — PROGRESS NOTE BEHAVIORAL HEALTH - AXIS III
hypothyroidism

## 2022-02-02 NOTE — PROGRESS NOTE BEHAVIORAL HEALTH - FUND OF KNOWLEDGE
Impaired
Normal
Impaired
Normal
Impaired
Impaired

## 2022-02-02 NOTE — PROGRESS NOTE BEHAVIORAL HEALTH - NSBHADMITIPOBS_PSY_A_CORE
Enhanced supervision

## 2022-02-02 NOTE — PROGRESS NOTE BEHAVIORAL HEALTH - AFFECT CONGRUENCE
Congruent
Congruent
Not congruent
Congruent
Not congruent
Congruent
Not congruent
Congruent

## 2022-02-02 NOTE — PROGRESS NOTE BEHAVIORAL HEALTH - NSBHADMITIPOBSFT_PSY_A_CORE
As per unit protocol

## 2022-02-02 NOTE — DISCHARGE NOTE BEHAVIORAL HEALTH - NSBHDCVIOLFCTRMIT_PSY_A_CORE
Patient has been treatment and medication compliant, not endorsing any side effects. Patient is future-oriented, looking forward to returning to [facility] and resuming work. Patient verbalizing reasons for living. Patient with improved insight into events that led up to hospitalization. Patient to use positive coping skills learned during the course of the inpatient hospitalization, eg STOP Skill. Patient verbalized willingness to seek care in moment of crisis. Patient is agreeable that should she have any thoughts of hurting herself or others, she will call 911, return to the ED.

## 2022-02-02 NOTE — PROGRESS NOTE BEHAVIORAL HEALTH - BEHAVIOR
Cooperative
Other
Cooperative
Other

## 2022-02-02 NOTE — DISCHARGE NOTE BEHAVIORAL HEALTH - MEDICATION SUMMARY - MEDICATIONS TO CHANGE
I will SWITCH the dose or number of times a day I take the medications listed below when I get home from the hospital:    Depakote  mg oral tablet, extended release  -- 1000 milligram(s) by mouth once a day (at bedtime)

## 2022-02-02 NOTE — DISCHARGE NOTE BEHAVIORAL HEALTH - PAST PSYCHIATRIC HISTORY
As noted above.  As per chart review, Patient with multiple past IPP admissions per collateral, has been with psychiatrist Dr. Guevara for 10-12 years. Diagnosed with schizoaffective disorder bipolar type

## 2022-02-02 NOTE — DISCHARGE NOTE BEHAVIORAL HEALTH - NS MD DC FALL RISK RISK
For information on Fall & Injury Prevention, visit: [email or web address] OR  [email or web address] OR  https://www.cdc.gov/steadi/patient.html

## 2022-02-02 NOTE — DISCHARGE NOTE BEHAVIORAL HEALTH - NSBHDCTESTSFT_PSY_A_CORE
Valproic acid level: 92 (22)  AST/ALT: 20/16  Plts: 229  A1C: 5.0  TSH: 0.85  Chol: 153  T  HDL: 45  LDL: 93 Valproic acid level: 97 (22)  AST/ALT: 20/16  Plts: 229  A1C: 5.0  TSH: 0.85  Chol: 153  T  HDL: 45  LDL: 93

## 2022-02-02 NOTE — PROGRESS NOTE BEHAVIORAL HEALTH - BODY HABITUS
Overweight

## 2022-02-02 NOTE — PROGRESS NOTE BEHAVIORAL HEALTH - IMPULSE CONTROL
Normal
Impaired
Normal
Impaired
Normal

## 2022-02-03 VITALS
SYSTOLIC BLOOD PRESSURE: 123 MMHG | RESPIRATION RATE: 18 BRPM | DIASTOLIC BLOOD PRESSURE: 76 MMHG | HEART RATE: 101 BPM | TEMPERATURE: 97 F

## 2022-02-03 PROCEDURE — 99238 HOSP IP/OBS DSCHRG MGMT 30/<: CPT

## 2022-02-03 RX ADMIN — Medication 1 PATCH: at 07:32

## 2022-02-03 RX ADMIN — FAMOTIDINE 20 MILLIGRAM(S): 10 INJECTION INTRAVENOUS at 08:13

## 2022-02-03 RX ADMIN — DIVALPROEX SODIUM 1000 MILLIGRAM(S): 500 TABLET, DELAYED RELEASE ORAL at 08:13

## 2022-02-03 RX ADMIN — Medication 50 MICROGRAM(S): at 06:26

## 2022-02-03 RX ADMIN — Medication 2 MILLIGRAM(S): at 06:27

## 2022-02-03 RX ADMIN — Medication 1 PATCH: at 08:13

## 2022-02-03 RX ADMIN — Medication 2 MILLIGRAM(S): at 08:13

## 2022-02-03 NOTE — CHART NOTE - NSCHARTNOTEFT_GEN_A_CORE
Pt was seen and evaluated, chart reviewed. As per nursing staff, no events overnight. On evaluation, pt reports she is doing well. Feels ready for discharge. Is compliant with medication, denies negative side effects. Endorsed good sleep and appetite. Denied auditory/visual hallucinations. Denied paranoia. Denied suicidal/homicidal ideation, intent or plan. Pt is for discharge today and verbalizes understanding of discharge instructions. Agreeable with outpatient follow up.

## 2022-02-07 DIAGNOSIS — K59.00 CONSTIPATION, UNSPECIFIED: ICD-10-CM

## 2022-02-07 DIAGNOSIS — R45.1 RESTLESSNESS AND AGITATION: ICD-10-CM

## 2022-02-07 DIAGNOSIS — Z59.00 HOMELESSNESS UNSPECIFIED: ICD-10-CM

## 2022-02-07 DIAGNOSIS — F17.210 NICOTINE DEPENDENCE, CIGARETTES, UNCOMPLICATED: ICD-10-CM

## 2022-02-07 DIAGNOSIS — E03.9 HYPOTHYROIDISM, UNSPECIFIED: ICD-10-CM

## 2022-02-07 DIAGNOSIS — F25.0 SCHIZOAFFECTIVE DISORDER, BIPOLAR TYPE: ICD-10-CM

## 2022-02-07 SDOH — ECONOMIC STABILITY - HOUSING INSECURITY: HOMELESSNESS UNSPECIFIED: Z59.00

## 2022-02-08 ENCOUNTER — NON-APPOINTMENT (OUTPATIENT)
Age: 37
End: 2022-02-08

## 2022-02-24 ENCOUNTER — LABORATORY RESULT (OUTPATIENT)
Age: 37
End: 2022-02-24

## 2022-02-25 ENCOUNTER — RESULT CHARGE (OUTPATIENT)
Age: 37
End: 2022-02-25

## 2022-02-25 ENCOUNTER — APPOINTMENT (OUTPATIENT)
Dept: OBGYN | Facility: CLINIC | Age: 37
End: 2022-02-25
Payer: COMMERCIAL

## 2022-02-25 ENCOUNTER — OUTPATIENT (OUTPATIENT)
Dept: OUTPATIENT SERVICES | Facility: HOSPITAL | Age: 37
LOS: 1 days | Discharge: HOME | End: 2022-02-25

## 2022-02-25 VITALS
HEIGHT: 66 IN | DIASTOLIC BLOOD PRESSURE: 70 MMHG | WEIGHT: 212 LBS | BODY MASS INDEX: 34.07 KG/M2 | SYSTOLIC BLOOD PRESSURE: 110 MMHG

## 2022-02-25 DIAGNOSIS — Z11.3 ENCOUNTER FOR SCREENING FOR INFECTIONS WITH A PREDOMINANTLY SEXUAL MODE OF TRANSMISSION: ICD-10-CM

## 2022-02-25 LAB
HCG UR QL: NEGATIVE
QUALITY CONTROL: YES

## 2022-02-25 PROCEDURE — 99213 OFFICE O/P EST LOW 20 MIN: CPT

## 2022-02-25 NOTE — HISTORY OF PRESENT ILLNESS
[FreeTextEntry1] : 37yo G0 with LMP several years ago here for follow-up GYN.  Amenorrheic secondary to DMPA, however last DMPA was 10/8.  Upreg today negative.  Currently sexually active with several male partners, desires STD testing today.  She was admitted to [facility] facility in Jan 2022. Desires DMPA for contraception at this time.  She reports having a "round something put in her vagina in 2006 by a [place]oc[place] doctor," and wants to see if it is still there.  Denies vaginal discharge or pelvic pain. No other complaints.

## 2022-02-25 NOTE — PLAN
[FreeTextEntry1] : 37yo G0, currently amenorrheic secondary to DMPA, here for gyn follow-up.\par \par - f/u vaginitis swab\par - f/u HIV, HBsAg, HCV, RPR\par - f/u pap smear, HPV\par - counseled on DMPA use, DMPA given in right deltoid\par - counseled on safe sex practices, offered barrier contraception\par - RTC in 3 months for DMPA

## 2022-03-03 ENCOUNTER — INPATIENT (INPATIENT)
Facility: HOSPITAL | Age: 37
LOS: 13 days | Discharge: HOME | End: 2022-03-17
Attending: PSYCHIATRY & NEUROLOGY | Admitting: PSYCHIATRY & NEUROLOGY
Payer: MEDICAID

## 2022-03-03 VITALS
WEIGHT: 169.98 LBS | RESPIRATION RATE: 18 BRPM | OXYGEN SATURATION: 100 % | HEART RATE: 77 BPM | TEMPERATURE: 98 F | HEIGHT: 67 IN | SYSTOLIC BLOOD PRESSURE: 159 MMHG | DIASTOLIC BLOOD PRESSURE: 103 MMHG

## 2022-03-03 DIAGNOSIS — F25.0 SCHIZOAFFECTIVE DISORDER, BIPOLAR TYPE: ICD-10-CM

## 2022-03-03 DIAGNOSIS — F17.200 NICOTINE DEPENDENCE, UNSPECIFIED, UNCOMPLICATED: ICD-10-CM

## 2022-03-03 DIAGNOSIS — Z11.3 ENCOUNTER FOR SCREENING FOR INFECTIONS WITH A PREDOMINANTLY SEXUAL MODE OF TRANSMISSION: ICD-10-CM

## 2022-03-03 LAB
ALBUMIN SERPL ELPH-MCNC: 4 G/DL — SIGNIFICANT CHANGE UP (ref 3.5–5.2)
ALP SERPL-CCNC: 54 U/L — SIGNIFICANT CHANGE UP (ref 30–115)
ALT FLD-CCNC: 10 U/L — SIGNIFICANT CHANGE UP (ref 0–41)
ANION GAP SERPL CALC-SCNC: 14 MMOL/L — SIGNIFICANT CHANGE UP (ref 7–14)
AST SERPL-CCNC: 14 U/L — SIGNIFICANT CHANGE UP (ref 0–41)
BASOPHILS # BLD AUTO: 0.03 K/UL — SIGNIFICANT CHANGE UP (ref 0–0.2)
BASOPHILS NFR BLD AUTO: 0.4 % — SIGNIFICANT CHANGE UP (ref 0–1)
BILIRUB SERPL-MCNC: <0.2 MG/DL — SIGNIFICANT CHANGE UP (ref 0.2–1.2)
BUN SERPL-MCNC: 21 MG/DL — HIGH (ref 10–20)
CALCIUM SERPL-MCNC: 9.2 MG/DL — SIGNIFICANT CHANGE UP (ref 8.5–10.1)
CHLORIDE SERPL-SCNC: 105 MMOL/L — SIGNIFICANT CHANGE UP (ref 98–110)
CO2 SERPL-SCNC: 21 MMOL/L — SIGNIFICANT CHANGE UP (ref 17–32)
COVID-19 SPIKE DOMAIN AB INTERP: POSITIVE
COVID-19 SPIKE DOMAIN ANTIBODY RESULT: >250 U/ML — HIGH
CREAT SERPL-MCNC: 0.7 MG/DL — SIGNIFICANT CHANGE UP (ref 0.7–1.5)
EGFR: 113 ML/MIN/1.73M2 — SIGNIFICANT CHANGE UP
EOSINOPHIL # BLD AUTO: 0.08 K/UL — SIGNIFICANT CHANGE UP (ref 0–0.7)
EOSINOPHIL NFR BLD AUTO: 1 % — SIGNIFICANT CHANGE UP (ref 0–8)
ETHANOL SERPL-MCNC: <10 MG/DL — SIGNIFICANT CHANGE UP
GLUCOSE SERPL-MCNC: 98 MG/DL — SIGNIFICANT CHANGE UP (ref 70–99)
HCG SERPL QL: NEGATIVE — SIGNIFICANT CHANGE UP
HCT VFR BLD CALC: 42.4 % — SIGNIFICANT CHANGE UP (ref 37–47)
HGB BLD-MCNC: 14.2 G/DL — SIGNIFICANT CHANGE UP (ref 12–16)
IMM GRANULOCYTES NFR BLD AUTO: 0.4 % — HIGH (ref 0.1–0.3)
LYMPHOCYTES # BLD AUTO: 2.14 K/UL — SIGNIFICANT CHANGE UP (ref 1.2–3.4)
LYMPHOCYTES # BLD AUTO: 26.1 % — SIGNIFICANT CHANGE UP (ref 20.5–51.1)
MCHC RBC-ENTMCNC: 30.3 PG — SIGNIFICANT CHANGE UP (ref 27–31)
MCHC RBC-ENTMCNC: 33.5 G/DL — SIGNIFICANT CHANGE UP (ref 32–37)
MCV RBC AUTO: 90.4 FL — SIGNIFICANT CHANGE UP (ref 81–99)
MONOCYTES # BLD AUTO: 1.1 K/UL — HIGH (ref 0.1–0.6)
MONOCYTES NFR BLD AUTO: 13.4 % — HIGH (ref 1.7–9.3)
NEUTROPHILS # BLD AUTO: 4.83 K/UL — SIGNIFICANT CHANGE UP (ref 1.4–6.5)
NEUTROPHILS NFR BLD AUTO: 58.7 % — SIGNIFICANT CHANGE UP (ref 42.2–75.2)
NRBC # BLD: 0 /100 WBCS — SIGNIFICANT CHANGE UP (ref 0–0)
PLATELET # BLD AUTO: 166 K/UL — SIGNIFICANT CHANGE UP (ref 130–400)
POTASSIUM SERPL-MCNC: 4.2 MMOL/L — SIGNIFICANT CHANGE UP (ref 3.5–5)
POTASSIUM SERPL-SCNC: 4.2 MMOL/L — SIGNIFICANT CHANGE UP (ref 3.5–5)
PROT SERPL-MCNC: 6.6 G/DL — SIGNIFICANT CHANGE UP (ref 6–8)
RBC # BLD: 4.69 M/UL — SIGNIFICANT CHANGE UP (ref 4.2–5.4)
RBC # FLD: 12.4 % — SIGNIFICANT CHANGE UP (ref 11.5–14.5)
SARS-COV-2 IGG+IGM SERPL QL IA: >250 U/ML — HIGH
SARS-COV-2 IGG+IGM SERPL QL IA: POSITIVE
SARS-COV-2 RNA SPEC QL NAA+PROBE: SIGNIFICANT CHANGE UP
SODIUM SERPL-SCNC: 140 MMOL/L — SIGNIFICANT CHANGE UP (ref 135–146)
VALPROATE SERPL-MCNC: 47 UG/ML — LOW (ref 50–100)
WBC # BLD: 8.21 K/UL — SIGNIFICANT CHANGE UP (ref 4.8–10.8)
WBC # FLD AUTO: 8.21 K/UL — SIGNIFICANT CHANGE UP (ref 4.8–10.8)

## 2022-03-03 PROCEDURE — 90792 PSYCH DIAG EVAL W/MED SRVCS: CPT

## 2022-03-03 PROCEDURE — 99285 EMERGENCY DEPT VISIT HI MDM: CPT

## 2022-03-03 RX ORDER — BENZTROPINE MESYLATE 1 MG
2 TABLET ORAL
Refills: 0 | Status: DISCONTINUED | OUTPATIENT
Start: 2022-03-03 | End: 2022-03-17

## 2022-03-03 RX ORDER — DIVALPROEX SODIUM 500 MG/1
1000 TABLET, DELAYED RELEASE ORAL
Refills: 0 | Status: DISCONTINUED | OUTPATIENT
Start: 2022-03-03 | End: 2022-03-17

## 2022-03-03 RX ORDER — GABAPENTIN 400 MG/1
300 CAPSULE ORAL
Refills: 0 | Status: DISCONTINUED | OUTPATIENT
Start: 2022-03-03 | End: 2022-03-07

## 2022-03-03 RX ORDER — NICOTINE POLACRILEX 2 MG
1 GUM BUCCAL DAILY
Refills: 0 | Status: DISCONTINUED | OUTPATIENT
Start: 2022-03-03 | End: 2022-03-04

## 2022-03-03 RX ORDER — LEVOTHYROXINE SODIUM 125 MCG
50 TABLET ORAL DAILY
Refills: 0 | Status: DISCONTINUED | OUTPATIENT
Start: 2022-03-03 | End: 2022-03-17

## 2022-03-03 RX ORDER — HALOPERIDOL DECANOATE 100 MG/ML
5 INJECTION INTRAMUSCULAR
Refills: 0 | Status: DISCONTINUED | OUTPATIENT
Start: 2022-03-03 | End: 2022-03-17

## 2022-03-03 RX ORDER — HALOPERIDOL DECANOATE 100 MG/ML
5 INJECTION INTRAMUSCULAR EVERY 8 HOURS
Refills: 0 | Status: DISCONTINUED | OUTPATIENT
Start: 2022-03-03 | End: 2022-03-04

## 2022-03-03 RX ADMIN — Medication 2 MILLIGRAM(S): at 22:00

## 2022-03-03 RX ADMIN — Medication 2 MILLIGRAM(S): at 11:36

## 2022-03-03 RX ADMIN — HALOPERIDOL DECANOATE 5 MILLIGRAM(S): 100 INJECTION INTRAMUSCULAR at 21:00

## 2022-03-03 NOTE — ED PROVIDER NOTE - ATTENDING CONTRIBUTION TO CARE
36-year-old woman, history of bipolar disorder was sent from [facility] due to increased homicidal ideation, patient threatened to kill her roommate.  On my eval she is alert, communicative, mildly agitated, stating she needs to be admitted to the "closed unit."  She is cooperative, however, and agreed for lab work and psychiatric consultation.  Vital signs, exam as noted.

## 2022-03-03 NOTE — ED BEHAVIORAL HEALTH ASSESSMENT NOTE - RISK ASSESSMENT
Low Acute Suicide Risk patient risk factors of impulsivity, risk behaviors and recent medication change are not sufficiently mitigated by her denial of SI/HI, and current medication compliance.

## 2022-03-03 NOTE — ED BEHAVIORAL HEALTH ASSESSMENT NOTE - DETAILS
s/w NP Ming s/w Dr. Jose previously on zyprexa and lithium, both stopped due to leg edema. only hospital records reviewed. denied s/w Katia Atwood (NP) Admitted 1/17 until 2/3 for manic like episode, discharged on additional zyprexa

## 2022-03-03 NOTE — ED PROVIDER NOTE - OBJECTIVE STATEMENT
37 y/o F, PMHx Bipolar Disorder, presents to the ED biba from [facility] after ''threatening to kill roommate." When asked why patient is here, she repeatedly states that she would like to be admitted to inpatient unit. She denies recent etoh/illicit drug use and denies any suicidal ideation. She additionally is without physical complaints including chest pain, dyspnea, nausea and vomiting.

## 2022-03-03 NOTE — ED BEHAVIORAL HEALTH ASSESSMENT NOTE - CURRENT MEDICATION
-- depakote 1000 mg bid  -- haldol dec 150 mg q 4 weeks (last dose given 3/1/22)  -- benztropine 2 mg daily  -- synthroid 50 mcg daily  -- famotidine 20mg po 9am, 5pm  -- senna 8.6mg tabs, 2 tabs at 9pm  -- gabapentin 300mg po q9am, 5pm

## 2022-03-03 NOTE — ED BEHAVIORAL HEALTH ASSESSMENT NOTE - CASE SUMMARY
36 year old female, from [place] [address], with schizoaffective disorder,  last discharged from inpatient psychiatry on 2/3/22,  in active treatment with Dr. Guevara on Haldol Decanoate 150mg every 4 weeks, last received on 3/1/22  and Depakote medical history of hypothyroidism who presents to ED for homicidal threat made toward her roommate. In the Ed, patient fluctuates between calm to belligerent requiring Ativan for agitation in the ed. Her speech was rumbling for most of the conversation which was not her baseline. She has been more aggressive toward her roommate at the group home and recently the threats and aggressive behaviors are now being geared toward family members. Given the worsening of psychotic symptoms and aggression despite being compliant with at  last her monthly injection medication of Haldol, this patient requires inpatient psychiatry admission for safety and further stabilization, and possibly medication reevaluation, particularly monthly Haldol Decanoate injection.

## 2022-03-03 NOTE — ED ADULT NURSE NOTE - CHIEF COMPLAINT QUOTE
pt BIBA from [address] [place] [address] for threatening to kill her roommate. pt admits to HI in triage. denies SI. denies drug/alcohol use. 1:1 placed in triage. pt restless and verbally aggressive in triage.

## 2022-03-03 NOTE — ED BEHAVIORAL HEALTH ASSESSMENT NOTE - SUMMARY
Ms. 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 Guevara outpatient, on Haldol decanoate, zyprexa and Depakote, pmh of hypothyroidism, bibems activated by residence for making homicidal statements of "I'm going to take a knife and kill my room mate. She stole expensive clothes from me". Psychiatry was consulted for evaluation of homicidal ideation.    Encounter found patient to be cooperative but with loud and with expansive circumstantial answers and mild grandiosity. Collateral from outpatient psychiatrist reports that the patient is not at baseline and has demonstrated a pattern of emotional lability as well making threatening statements towards others including towards family. The patient warrants inpatient psychiatric admission for safety, stabilization and further medication adjustment.     Recommendations:  - Admit to inpatient psychiatric unit under 9.39 legal status once patient is medially cleared  - Restart home medications:   -- depakote 1000 mg bid  -- haldol dec 150 mg q 4 weeks (last dose given 3/1/22)  -- benztropine 2 mg daily  -- synthroid 50 mcg daily  - for agitation not amenable to verbal redirection, may give zyprexa 5 mg q6h prn with escalation to IM if pt is a danger to self or/and others with repeat EKG to ensure QTc <500 ms. Ms. 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 Guevara outpatient, on Haldol decanoate and Depakote, pmh of hypothyroidism, bibems activated by residence for making homicidal statements of "I'm going to take a knife and kill my room mate. She stole expensive clothes from me". Psychiatry was consulted for evaluation of homicidal ideation.    Encounter found patient to be cooperative but with loud and with expansive circumstantial answers and mild grandiosity. Collateral from outpatient psychiatrist reports that the patient is not at baseline and has demonstrated a pattern of emotional lability as well making threatening statements towards others including towards family. The patient warrants inpatient psychiatric admission for safety, stabilization and further medication adjustment.     Recommendations:  - Admit to inpatient psychiatric unit under 9.39 legal status once patient is medially cleared  - Restart home medications:   -- depakote 1000 mg bid  -- haldol dec 150 mg q 4 weeks (last dose given 3/1/22)  -- benztropine 2 mg daily  -- synthroid 50 mcg daily  - Start Haldol 5mg po qHS so that haldol decanoate may be increased to 200mg in the future  - for agitation not amenable to verbal redirection, may give zyprexa 5 mg q6h prn with escalation to IM if pt is a danger to self or/and others with repeat EKG to ensure QTc <500 ms. Ms. 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 Guevara outpatient, on Haldol decanoate and Depakote, pmh of hypothyroidism, bibems activated by residence for making homicidal statements of "I'm going to take a knife and kill my room mate. She stole expensive clothes from me". Psychiatry was consulted for evaluation of homicidal ideation.    Encounter found patient to be cooperative but with loud and with expansive circumstantial answers and mild grandiosity. Collateral from outpatient psychiatrist reports that the patient is not at baseline and has demonstrated a pattern of emotional lability as well making threatening statements towards others including towards family. The patient warrants inpatient psychiatric admission for safety, stabilization and further medication adjustment.     Recommendations:  - Admit to inpatient psychiatric unit under 9.39 legal status once patient is medially cleared  - Restart home medications:   -- depakote 1000 mg bid  -- haldol dec 150 mg q 4 weeks (last dose given 3/1/22)  -- benztropine 2 mg daily  -- synthroid 50 mcg daily  -- gabapentin 300mg po q9am, 5pm  - Start Haldol 5mg po qHS so that haldol decanoate may be increased to 200mg in the future  - for agitation not amenable to verbal redirection, may give haldol 5 mg q6h prn with escalation to IM if pt is a danger to self or/and others with repeat EKG to ensure QTc <500 ms. Ms. Blas is a 36 year old female, single, domiciled at 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 Guevara outpatient, on Haldol decanoate and Depakote, pmh of hypothyroidism,  brought to ED,  activated by residence for making homicidal statements of "I'm going to take a knife and kill my room mate. She stole expensive clothes from me". Psychiatry was consulted for evaluation of homicidal ideation.    Encounter found patient to be cooperative but with loud and with expansive circumstantial answers and mild grandiosity. Collateral from outpatient psychiatrist reports that the patient is not at baseline and has demonstrated a pattern of emotional lability as well making threatening statements towards others including towards family. The patient warrants inpatient psychiatric admission for safety, stabilization and further medication adjustment.     Recommendations:  - Admit to inpatient psychiatric unit under 9.39 legal status once patient is medially cleared  - Restart home medications:   -- depakote 1000 mg bid  -- haldol dec 150 mg q 4 weeks (last dose given 3/1/22)  -- benztropine 2 mg daily  -- synthroid 50 mcg daily  -- gabapentin 300mg po q9am, 5pm  - Start Haldol 5mg po qHS so that haldol decanoate may be increased to 200mg in the future  - for agitation not amenable to verbal redirection, may give haldol 5 mg q6h prn with escalation to IM if pt is a danger to self or/and others with repeat EKG to ensure QTc <500 ms.

## 2022-03-03 NOTE — H&P ADULT - NSHPPHYSICALEXAM_GEN_ALL_CORE
Vital Signs Last 24 Hrs  T(C): 35.9 (03 Mar 2022 20:19), Max: 37.1 (03 Mar 2022 15:43)  T(F): 96.7 (03 Mar 2022 20:19), Max: 98.8 (03 Mar 2022 15:43)  HR: 94 (03 Mar 2022 20:19) (77 - 94)  BP: 135/86 (03 Mar 2022 20:19) (99/55 - 159/103)  BP(mean): --  RR: 20 (03 Mar 2022 20:19) (18 - 20)  SpO2: 99% (03 Mar 2022 15:43) (99% - 100%)          Constitutional: NAD, A&O x3    Eyes: PERRLA, no conjuctivitis    Neck: no lymphadenopathy    Respiratory: +air entry, no rales, no rhonchi, no wheezes    Cardiovascular: +S1 and S2, regular rate and rhythm    Gastrointestinal: +BS, soft, non-tender, not distended    Extremities:  no edema, no calf tenderness    Neurological: sensation intact, ROM equal B/L, CN II-XII intact    Skin: no rashes, normal turgor

## 2022-03-03 NOTE — ED BEHAVIORAL HEALTH ASSESSMENT NOTE - COMMENTS ON VIOLENCE RISK/PROTECTIVE FACTORS:
risk factors of impulsivity only partially mitigated by his residential stability and engagement in treatment

## 2022-03-03 NOTE — ED BEHAVIORAL HEALTH ASSESSMENT NOTE - CURRENT ACTIVE IDEATION:
RX PROGRESS NOTE: Vancomycin Therapeutic Drug Monitoring    Day of therapy: 1    Indication and target trough: Pneumonia (10-15 mcg/mL)    Current vancomycin dosing regimen: 1250 mg IVPB every 12 hours    Most recent height and weight information:  Weight: 73.9 kg (06/27/21 0021)  Height: 5' 2\" (157.5 cm) (06/27/21 0021)    The Following are the Calculated  Current Weights for Ashlie Mccallum            Adjusted Ideal    59.6 kg 50.1 kg          Labs:  Serum Creatinine and Creatinine Clearance:  Serum creatinine: 0.96 mg/dL (H) 06/27/21 0455  Estimated creatinine clearance: 58.6 mL/min (A)    Vancomycin Serum Concentrations:       Assessment:  Based on the slight increase in SCr, will adjust regimen to vancomycin 1000 mg IVPB every 12 hours.    Additional serum concentrations may be necessary depending on pathogen identified, risk factors for adverse events, and/or duration of therapy.  Pharmacy will continue to follow and adjust as needed.    Thank you,    Katrin Hu [facility]  6/27/2021 9:01 AM  Contact # [phone]     None known

## 2022-03-03 NOTE — H&P ADULT - HISTORY OF PRESENT ILLNESS
35 y/o F, PMHx Bipolar Disorder, presents to the ED biba from [facility] after ''threatening to kill roommate." When asked why patient is here, she repeatedly states that she would like to be admitted to inpatient unit. She denies recent etoh/illicit drug use and denies any suicidal ideation. She additionally is without physical complaints including chest pain, dyspnea, nausea and vomiting.  35 y/o F, PMHx Bipolar Disorder, hypothyroidism admitted for schizoaffective disorder. According to chart, pt brought to the ED by ambulance from [facility] after ''threatening to kill roommate."  Pt seen and examined, NAD, denies recent etoh/illicit drug use and denies any suicidal ideation. Denies CP, SOB cough and palpitations. Pt has mild left LE swelling, when asked, pt states, she fell about 3 weeks ago but did not break any bone. Pt states, it has improved from what it was 2 weeks ago.

## 2022-03-03 NOTE — PATIENT PROFILE BEHAVIORAL HEALTH - FALL HARM RISK - UNIVERSAL INTERVENTIONS
Bed in lowest position, wheels locked, appropriate side rails in place/Call bell, personal items and telephone in reach/Instruct patient to call for assistance before getting out of bed or chair/Non-slip footwear when patient is out of bed/[place] to call system/Physically safe environment - no spills, clutter or unnecessary equipment/Purposeful Proactive Rounding/Room/bathroom lighting operational, light cord in reach
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
## 2022-03-03 NOTE — ED BEHAVIORAL HEALTH ASSESSMENT NOTE - PATIENT'S CHIEF COMPLAINT
"My name is Ashlyn Vivarcristopher. My father is Kristan lBas. He  many years ago in an accident, My mother, Justin Blas is also dead"

## 2022-03-03 NOTE — H&P ADULT - ASSESSMENT
35 y/o female with PMHx Bipolar Disorder, hypothyroidism admitted for schizoaffective disorder.  A/P:  -Admit to [facility]PP  -Psych evaluation and mgmt   -Hypothyroidism on synthroid

## 2022-03-03 NOTE — ED BEHAVIORAL HEALTH ASSESSMENT NOTE - OTHER PAST PSYCHIATRIC HISTORY (INCLUDE DETAILS REGARDING ONSET, COURSE OF ILLNESS, INPATIENT/OUTPATIENT TREATMENT)
Patient with multiple past IPP admissions, has been with psychiatrist Dr. Guevara for 10-12 years. Diagnosed with schizoaffective disorder bipolar type
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
## 2022-03-03 NOTE — ED BEHAVIORAL HEALTH ASSESSMENT NOTE - DESCRIPTION
36 year old female, single, domiciled at [facility] TLR 2, undocumented hypothyroidism, prolactinemia, GERD Patient brought in by ems from [facility]  placed on 1:1  psychiatry consulted.
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
## 2022-03-03 NOTE — PATIENT PROFILE BEHAVIORAL HEALTH - NSBHTYPESPROP_PSY_A_CORE
postmenopausal age. The endometrium is homogeneous in appearance. Right Ovary: The right ovary is normal in appearance. There is no right adnexal mass identified. Left Ovary: There is asymmetric enlargement of the left ovary relative to the right. There is no discrete mass identified within the left ovary. If this truly is a left adnexal mass, it is inseparable from the left ovary. Free Fluid: No free fluid is identified. 1. Asymmetric enlargement of the left ovary which is homogeneous and solid in appearance. This corresponds to the findings on the previous CT scan. This may represent a normal left ovary, larger than the right. However, given the asymmetry in size, consider pelvic MRI for further characterization. There is no distinct mass identified within the ovary. 2. Mild thickening of the endometrium measuring 6 mm. A follow-up pelvic ultrasound in 12 weeks is recommended to ensure resolution given the patient is postmenopausal.     Us Dup Abd Pel Retro Scrot Limited    Result Date: 3/3/2020  EXAMINATION: PELVIC ULTRASOUND 3/3/2020 TECHNIQUE: Transabdominal pelvic ultrasound was performed with color Doppler flow evaluation. COMPARISON: CT abdomen and pelvis 03/02/2020 HISTORY: ORDERING SYSTEM PROVIDED HISTORY: left adnexal mass on CT TECHNOLOGIST PROVIDED HISTORY: left adnexal mass on CT FINDINGS: Measurements: Uterus:  6 x 9 x 4.6 x 2.8 cm Endometrial stripe:  0.6 cm Right Ovary:  1.4 x 1.5 x 1.4 cm Left Ovary:  3.3 x 3.2 x 2.3 cm Ultrasound Findings: Uterus: Uterus demonstrates normal myometrial echotexture. Endometrial stripe: The endometrium is mildly thickened given the patient's postmenopausal age. The endometrium is homogeneous in appearance. Right Ovary: The right ovary is normal in appearance. There is no right adnexal mass identified. Left Ovary: There is asymmetric enlargement of the left ovary relative to the right. There is no discrete mass identified within the left ovary.   If lobe with the largest 2 masses seen in the left frontal lobe anteriorly/inferiorly and left frontal lobe posteriorly. These 2 larger masses have adjacent edema with associated mass effect and rightward midline shift. These are concerning for neoplastic process and neurosurgical consultation is recommended. Findings were discussed with Nba Macdonald at 11:50 am on 3/2/2020. A/P      Multiple Brain Massess, likely metastatic  Left Adnexal mass        - Continue Decadron 4mg ever 6 hours  - Continue Keppra 500mg  Twice daily  - CT chest abdomen and pelvis completed showing left adnexal mass  - Hematology/Oncology on board- recommending Gyn Onc consult for left adnexal mass for possible biopsy  - Will continue to follow        Please contact neurosurgery with any changes in patients neurologic status. Mellissa Suarez CNP  3/4/20  7:26 AM      I have seen and examined the patient independently. I reviewed all laboratory and imaging studies that are relevant. I agree with the resident's note with the below addendum. behavior

## 2022-03-03 NOTE — H&P ADULT - NSHPLABSRESULTS_GEN_ALL_CORE
14.2   8.21  )-----------( 166      ( 03 Mar 2022 08:11 )             42.4     03-03    140  |  105  |  21<H>  ----------------------------<  98  4.2   |  21  |  0.7    Ca    9.2      03 Mar 2022 08:11    TPro  6.6  /  Alb  4.0  /  TBili  <0.2  /  DBili  x   /  AST  14  /  ALT  10  /  AlkPhos  54  03-03

## 2022-03-03 NOTE — ED ADULT NURSE NOTE - NSIMPLEMENTINTERV_GEN_ALL_ED
Implemented All Universal Safety Interventions:  [place] to call system. Call bell, personal items and telephone within reach. Instruct patient to call for assistance. Room bathroom lighting operational. Non-slip footwear when patient is off stretcher. Physically safe environment: no spills, clutter or unnecessary equipment. Stretcher in lowest position, wheels locked, appropriate side rails in place.

## 2022-03-03 NOTE — ED PROVIDER NOTE - PROGRESS NOTE DETAILS
MS Teams consult placed to psychiatry Spoke with pt's outpt psychiatrist Dr Guevara who reports pt has been noncompliant w meds, threatening to family members. Callback number for him is [phone]. Pt admitted to psych, but endorsed to Dr Chambers pending transfer. She is currently calm and cooparative.

## 2022-03-04 LAB
A1C WITH ESTIMATED AVERAGE GLUCOSE RESULT: 5 % — SIGNIFICANT CHANGE UP (ref 4–5.6)
ALBUMIN SERPL ELPH-MCNC: 4.3 G/DL — SIGNIFICANT CHANGE UP (ref 3.5–5.2)
ALP SERPL-CCNC: 58 U/L — SIGNIFICANT CHANGE UP (ref 30–115)
ALT FLD-CCNC: 11 U/L — SIGNIFICANT CHANGE UP (ref 0–41)
ANION GAP SERPL CALC-SCNC: 13 MMOL/L — SIGNIFICANT CHANGE UP (ref 7–14)
AST SERPL-CCNC: 14 U/L — SIGNIFICANT CHANGE UP (ref 0–41)
BASOPHILS # BLD AUTO: 0.03 K/UL — SIGNIFICANT CHANGE UP (ref 0–0.2)
BASOPHILS NFR BLD AUTO: 0.4 % — SIGNIFICANT CHANGE UP (ref 0–1)
BILIRUB SERPL-MCNC: 0.3 MG/DL — SIGNIFICANT CHANGE UP (ref 0.2–1.2)
BUN SERPL-MCNC: 17 MG/DL — SIGNIFICANT CHANGE UP (ref 10–20)
CALCIUM SERPL-MCNC: 9.2 MG/DL — SIGNIFICANT CHANGE UP (ref 8.5–10.1)
CHLORIDE SERPL-SCNC: 103 MMOL/L — SIGNIFICANT CHANGE UP (ref 98–110)
CHOLEST SERPL-MCNC: 150 MG/DL — SIGNIFICANT CHANGE UP
CO2 SERPL-SCNC: 21 MMOL/L — SIGNIFICANT CHANGE UP (ref 17–32)
COVID-19 SPIKE DOMAIN AB INTERP: POSITIVE
COVID-19 SPIKE DOMAIN ANTIBODY RESULT: >250 U/ML — HIGH
CREAT SERPL-MCNC: 0.7 MG/DL — SIGNIFICANT CHANGE UP (ref 0.7–1.5)
EGFR: 113 ML/MIN/1.73M2 — SIGNIFICANT CHANGE UP
EOSINOPHIL # BLD AUTO: 0.12 K/UL — SIGNIFICANT CHANGE UP (ref 0–0.7)
EOSINOPHIL NFR BLD AUTO: 1.5 % — SIGNIFICANT CHANGE UP (ref 0–8)
ESTIMATED AVERAGE GLUCOSE: 97 MG/DL — SIGNIFICANT CHANGE UP (ref 68–114)
GLUCOSE SERPL-MCNC: 111 MG/DL — HIGH (ref 70–99)
HCG SERPL-ACNC: <0.6 MIU/ML — SIGNIFICANT CHANGE UP
HCT VFR BLD CALC: 45.9 % — SIGNIFICANT CHANGE UP (ref 37–47)
HDLC SERPL-MCNC: 41 MG/DL — LOW
HGB BLD-MCNC: 15.2 G/DL — SIGNIFICANT CHANGE UP (ref 12–16)
IMM GRANULOCYTES NFR BLD AUTO: 0.5 % — HIGH (ref 0.1–0.3)
LIPID PNL WITH DIRECT LDL SERPL: 94 MG/DL — SIGNIFICANT CHANGE UP
LYMPHOCYTES # BLD AUTO: 2.31 K/UL — SIGNIFICANT CHANGE UP (ref 1.2–3.4)
LYMPHOCYTES # BLD AUTO: 28.3 % — SIGNIFICANT CHANGE UP (ref 20.5–51.1)
MCHC RBC-ENTMCNC: 29.8 PG — SIGNIFICANT CHANGE UP (ref 27–31)
MCHC RBC-ENTMCNC: 33.1 G/DL — SIGNIFICANT CHANGE UP (ref 32–37)
MCV RBC AUTO: 90 FL — SIGNIFICANT CHANGE UP (ref 81–99)
MONOCYTES # BLD AUTO: 1.22 K/UL — HIGH (ref 0.1–0.6)
MONOCYTES NFR BLD AUTO: 14.9 % — HIGH (ref 1.7–9.3)
NEUTROPHILS # BLD AUTO: 4.45 K/UL — SIGNIFICANT CHANGE UP (ref 1.4–6.5)
NEUTROPHILS NFR BLD AUTO: 54.4 % — SIGNIFICANT CHANGE UP (ref 42.2–75.2)
NON HDL CHOLESTEROL: 109 MG/DL — SIGNIFICANT CHANGE UP
NRBC # BLD: 0 /100 WBCS — SIGNIFICANT CHANGE UP (ref 0–0)
NT-PROBNP SERPL-SCNC: 98 PG/ML — SIGNIFICANT CHANGE UP (ref 0–300)
PLATELET # BLD AUTO: 164 K/UL — SIGNIFICANT CHANGE UP (ref 130–400)
POTASSIUM SERPL-MCNC: 4.5 MMOL/L — SIGNIFICANT CHANGE UP (ref 3.5–5)
POTASSIUM SERPL-SCNC: 4.5 MMOL/L — SIGNIFICANT CHANGE UP (ref 3.5–5)
PROT SERPL-MCNC: 7.3 G/DL — SIGNIFICANT CHANGE UP (ref 6–8)
RBC # BLD: 5.1 M/UL — SIGNIFICANT CHANGE UP (ref 4.2–5.4)
RBC # FLD: 12.3 % — SIGNIFICANT CHANGE UP (ref 11.5–14.5)
SARS-COV-2 IGG+IGM SERPL QL IA: >250 U/ML — HIGH
SARS-COV-2 IGG+IGM SERPL QL IA: POSITIVE
SODIUM SERPL-SCNC: 137 MMOL/L — SIGNIFICANT CHANGE UP (ref 135–146)
TRIGL SERPL-MCNC: 123 MG/DL — SIGNIFICANT CHANGE UP
TSH SERPL-MCNC: 2.45 UIU/ML — SIGNIFICANT CHANGE UP (ref 0.27–4.2)
WBC # BLD: 8.17 K/UL — SIGNIFICANT CHANGE UP (ref 4.8–10.8)
WBC # FLD AUTO: 8.17 K/UL — SIGNIFICANT CHANGE UP (ref 4.8–10.8)

## 2022-03-04 PROCEDURE — 93926 LOWER EXTREMITY STUDY: CPT | Mod: 26,LT

## 2022-03-04 PROCEDURE — 93971 EXTREMITY STUDY: CPT | Mod: 26,LT

## 2022-03-04 PROCEDURE — 93010 ELECTROCARDIOGRAM REPORT: CPT

## 2022-03-04 PROCEDURE — 99232 SBSQ HOSP IP/OBS MODERATE 35: CPT

## 2022-03-04 RX ORDER — IBUPROFEN 200 MG
400 TABLET ORAL EVERY 6 HOURS
Refills: 0 | Status: DISCONTINUED | OUTPATIENT
Start: 2022-03-04 | End: 2022-03-17

## 2022-03-04 RX ORDER — NICOTINE POLACRILEX 2 MG
1 GUM BUCCAL DAILY
Refills: 0 | Status: DISCONTINUED | OUTPATIENT
Start: 2022-03-04 | End: 2022-03-05

## 2022-03-04 RX ORDER — NICOTINE POLACRILEX 2 MG
2 GUM BUCCAL
Refills: 0 | Status: DISCONTINUED | OUTPATIENT
Start: 2022-03-04 | End: 2022-03-09

## 2022-03-04 RX ORDER — FAMOTIDINE 10 MG/ML
20 INJECTION INTRAVENOUS
Refills: 0 | Status: DISCONTINUED | OUTPATIENT
Start: 2022-03-04 | End: 2022-03-17

## 2022-03-04 RX ORDER — OLANZAPINE 15 MG/1
5 TABLET, FILM COATED ORAL EVERY 8 HOURS
Refills: 0 | Status: DISCONTINUED | OUTPATIENT
Start: 2022-03-04 | End: 2022-03-07

## 2022-03-04 RX ORDER — SENNA PLUS 8.6 MG/1
2 TABLET ORAL AT BEDTIME
Refills: 0 | Status: DISCONTINUED | OUTPATIENT
Start: 2022-03-04 | End: 2022-03-17

## 2022-03-04 RX ORDER — NICOTINE POLACRILEX 2 MG
1 GUM BUCCAL DAILY
Refills: 0 | Status: DISCONTINUED | OUTPATIENT
Start: 2022-03-05 | End: 2022-03-17

## 2022-03-04 RX ORDER — OLANZAPINE 15 MG/1
5 TABLET, FILM COATED ORAL EVERY 8 HOURS
Refills: 0 | Status: DISCONTINUED | OUTPATIENT
Start: 2022-03-04 | End: 2022-03-04

## 2022-03-04 RX ORDER — HALOPERIDOL DECANOATE 100 MG/ML
50 INJECTION INTRAMUSCULAR ONCE
Refills: 0 | Status: COMPLETED | OUTPATIENT
Start: 2022-03-04 | End: 2022-03-04

## 2022-03-04 RX ADMIN — Medication 2 MILLIGRAM(S): at 08:09

## 2022-03-04 RX ADMIN — OLANZAPINE 5 MILLIGRAM(S): 15 TABLET, FILM COATED ORAL at 20:55

## 2022-03-04 RX ADMIN — Medication 2 MILLIGRAM(S): at 08:10

## 2022-03-04 RX ADMIN — HALOPERIDOL DECANOATE 5 MILLIGRAM(S): 100 INJECTION INTRAMUSCULAR at 20:38

## 2022-03-04 RX ADMIN — SENNA PLUS 2 TABLET(S): 8.6 TABLET ORAL at 20:41

## 2022-03-04 RX ADMIN — DIVALPROEX SODIUM 1000 MILLIGRAM(S): 500 TABLET, DELAYED RELEASE ORAL at 08:09

## 2022-03-04 RX ADMIN — Medication 400 MILLIGRAM(S): at 17:46

## 2022-03-04 RX ADMIN — HALOPERIDOL DECANOATE 50 MILLIGRAM(S): 100 INJECTION INTRAMUSCULAR at 16:35

## 2022-03-04 RX ADMIN — GABAPENTIN 300 MILLIGRAM(S): 400 CAPSULE ORAL at 17:27

## 2022-03-04 RX ADMIN — Medication 1 PATCH: at 08:12

## 2022-03-04 RX ADMIN — FAMOTIDINE 20 MILLIGRAM(S): 10 INJECTION INTRAVENOUS at 20:45

## 2022-03-04 RX ADMIN — GABAPENTIN 300 MILLIGRAM(S): 400 CAPSULE ORAL at 08:10

## 2022-03-04 RX ADMIN — Medication 50 MICROGRAM(S): at 05:54
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
## 2022-03-04 NOTE — PROGRESS NOTE BEHAVIORAL HEALTH - NSBHADDHXSUBSTFT_PSY_A_CORE
Pt reports cigarettes use, 2 PPD. She denies recent use of alcohol and cannabis. Denies recent use of other illicit substances. Pt reports cigarettes use, 1-2 PPD. She denies recent use of alcohol and cannabis. Denies recent use of other illicit substances.
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
## 2022-03-04 NOTE — PROGRESS NOTE BEHAVIORAL HEALTH - NSBHADDHXPSYCHFT_PSY_A_CORE
As per chart review, Patient with multiple past [facility] admissions per collateral, has been with psychiatrist Dr. Guevara for 10-12 years. Diagnosed with schizoaffective disorder bipolar type As per chart review, Patient with multiple past IPP admissions per collateral, has been with psychiatrist Dr. Guevara for 10-12 years. Diagnosed with schizoaffective disorder bipolar type. Pt with hx of not compliant with oral medications.

## 2022-03-04 NOTE — PROGRESS NOTE BEHAVIORAL HEALTH - NSBHADMITMEDEDUDETAILS_A_CORE FT
Reeducated on risks and benefits of zyprexa, and side effects profile with emphasis on peripheral edema and chart review of h/o peripheral edema possibly 2/2 zyprexa. Pt verbalizes understanding, reports h/o good response to zyprexa and requests to restart medication. Reeducated on risks and benefits of haldol dec, and side effects profile with plan to titrate monthly haldol dec dose. Pt verbalized understanding.
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
## 2022-03-05 RX ORDER — POLYETHYLENE GLYCOL 3350 17 G/17G
17 POWDER, FOR SOLUTION ORAL EVERY 24 HOURS
Refills: 0 | Status: DISCONTINUED | OUTPATIENT
Start: 2022-03-05 | End: 2022-03-17

## 2022-03-05 RX ADMIN — Medication 2 MILLIGRAM(S): at 12:55

## 2022-03-05 RX ADMIN — DIVALPROEX SODIUM 1000 MILLIGRAM(S): 500 TABLET, DELAYED RELEASE ORAL at 10:43

## 2022-03-05 RX ADMIN — DIVALPROEX SODIUM 1000 MILLIGRAM(S): 500 TABLET, DELAYED RELEASE ORAL at 20:48

## 2022-03-05 RX ADMIN — Medication 2 MILLIGRAM(S): at 05:52

## 2022-03-05 RX ADMIN — Medication 50 MICROGRAM(S): at 05:50

## 2022-03-05 RX ADMIN — Medication 400 MILLIGRAM(S): at 16:36

## 2022-03-05 RX ADMIN — HALOPERIDOL DECANOATE 5 MILLIGRAM(S): 100 INJECTION INTRAMUSCULAR at 20:47

## 2022-03-05 RX ADMIN — Medication 400 MILLIGRAM(S): at 12:52

## 2022-03-05 RX ADMIN — Medication 2 MILLIGRAM(S): at 08:47

## 2022-03-05 RX ADMIN — Medication 1 PATCH: at 20:00

## 2022-03-05 RX ADMIN — Medication 2 MILLIGRAM(S): at 19:42

## 2022-03-05 RX ADMIN — Medication 1 PATCH: at 08:23

## 2022-03-05 RX ADMIN — Medication 1 PATCH: at 08:24

## 2022-03-05 RX ADMIN — GABAPENTIN 300 MILLIGRAM(S): 400 CAPSULE ORAL at 17:10

## 2022-03-05 RX ADMIN — OLANZAPINE 5 MILLIGRAM(S): 15 TABLET, FILM COATED ORAL at 08:52

## 2022-03-05 RX ADMIN — POLYETHYLENE GLYCOL 3350 17 GRAM(S): 17 POWDER, FOR SOLUTION ORAL at 21:11

## 2022-03-05 RX ADMIN — SENNA PLUS 2 TABLET(S): 8.6 TABLET ORAL at 20:48

## 2022-03-05 RX ADMIN — FAMOTIDINE 20 MILLIGRAM(S): 10 INJECTION INTRAVENOUS at 08:22

## 2022-03-05 RX ADMIN — FAMOTIDINE 20 MILLIGRAM(S): 10 INJECTION INTRAVENOUS at 20:48

## 2022-03-05 RX ADMIN — Medication 2 MILLIGRAM(S): at 08:22

## 2022-03-05 RX ADMIN — Medication 2 MILLIGRAM(S): at 16:06

## 2022-03-05 RX ADMIN — GABAPENTIN 300 MILLIGRAM(S): 400 CAPSULE ORAL at 08:22

## 2022-03-05 RX ADMIN — Medication 2 MILLIGRAM(S): at 03:22

## 2022-03-05 NOTE — PROGRESS NOTE BEHAVIORAL HEALTH - NSBHFUPINTERVALHXFT_PSY_A_CORE
chart reviewed, patient seen and evaluated at bedside. No acute overnight events reported.     Encounter found patient to be sitting comfortably in bed, no acute distress. Patient reports she took her depakote this morning and is once more reporting that "zyprex works for me". Patient offered no new complaints at this time.
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
## 2022-03-06 PROCEDURE — 99231 SBSQ HOSP IP/OBS SF/LOW 25: CPT

## 2022-03-06 RX ADMIN — Medication 2 MILLIGRAM(S): at 19:54

## 2022-03-06 RX ADMIN — Medication 2 MILLIGRAM(S): at 12:13

## 2022-03-06 RX ADMIN — SENNA PLUS 2 TABLET(S): 8.6 TABLET ORAL at 20:00

## 2022-03-06 RX ADMIN — Medication 50 MICROGRAM(S): at 05:18

## 2022-03-06 RX ADMIN — GABAPENTIN 300 MILLIGRAM(S): 400 CAPSULE ORAL at 16:46

## 2022-03-06 RX ADMIN — DIVALPROEX SODIUM 1000 MILLIGRAM(S): 500 TABLET, DELAYED RELEASE ORAL at 08:02

## 2022-03-06 RX ADMIN — FAMOTIDINE 20 MILLIGRAM(S): 10 INJECTION INTRAVENOUS at 08:02

## 2022-03-06 RX ADMIN — DIVALPROEX SODIUM 1000 MILLIGRAM(S): 500 TABLET, DELAYED RELEASE ORAL at 20:01

## 2022-03-06 RX ADMIN — Medication 1 PATCH: at 07:54

## 2022-03-06 RX ADMIN — Medication 2 MILLIGRAM(S): at 08:02

## 2022-03-06 RX ADMIN — OLANZAPINE 5 MILLIGRAM(S): 15 TABLET, FILM COATED ORAL at 09:26

## 2022-03-06 RX ADMIN — GABAPENTIN 300 MILLIGRAM(S): 400 CAPSULE ORAL at 08:02

## 2022-03-06 RX ADMIN — Medication 1 PATCH: at 08:02

## 2022-03-06 RX ADMIN — FAMOTIDINE 20 MILLIGRAM(S): 10 INJECTION INTRAVENOUS at 20:00

## 2022-03-06 RX ADMIN — Medication 1 PATCH: at 20:01

## 2022-03-06 RX ADMIN — HALOPERIDOL DECANOATE 5 MILLIGRAM(S): 100 INJECTION INTRAMUSCULAR at 20:00

## 2022-03-06 NOTE — PROGRESS NOTE BEHAVIORAL HEALTH - NSBHFUPINTERVALHXFT_PSY_A_CORE
chart reviewed, patient seen and evaluated at bedside. No acute overnight events reported. As per nurses, pt is religiously preoccupied, with disorganised behavior and loud outbursts, but redirectable and taking her medications. 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 Denies si/hi/ah/vh.
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
## 2022-03-07 LAB
ALBUMIN SERPL ELPH-MCNC: 4.1 G/DL — SIGNIFICANT CHANGE UP (ref 3.5–5.2)
ALP SERPL-CCNC: 50 U/L — SIGNIFICANT CHANGE UP (ref 30–115)
ALT FLD-CCNC: 10 U/L — SIGNIFICANT CHANGE UP (ref 0–41)
AST SERPL-CCNC: 14 U/L — SIGNIFICANT CHANGE UP (ref 0–41)
BILIRUB DIRECT SERPL-MCNC: <0.2 MG/DL — SIGNIFICANT CHANGE UP (ref 0–0.3)
BILIRUB INDIRECT FLD-MCNC: >0 MG/DL — LOW (ref 0.2–1.2)
BILIRUB SERPL-MCNC: 0.2 MG/DL — SIGNIFICANT CHANGE UP (ref 0.2–1.2)
PROT SERPL-MCNC: 6.9 G/DL — SIGNIFICANT CHANGE UP (ref 6–8)
SARS-COV-2 RNA SPEC QL NAA+PROBE: SIGNIFICANT CHANGE UP
VALPROATE SERPL-MCNC: 103 UG/ML — CRITICAL HIGH (ref 50–100)

## 2022-03-07 PROCEDURE — 99231 SBSQ HOSP IP/OBS SF/LOW 25: CPT

## 2022-03-07 RX ORDER — GABAPENTIN 400 MG/1
300 CAPSULE ORAL THREE TIMES A DAY
Refills: 0 | Status: DISCONTINUED | OUTPATIENT
Start: 2022-03-07 | End: 2022-03-09

## 2022-03-07 RX ORDER — CHLORPROMAZINE HCL 10 MG
50 TABLET ORAL EVERY 8 HOURS
Refills: 0 | Status: DISCONTINUED | OUTPATIENT
Start: 2022-03-07 | End: 2022-03-17

## 2022-03-07 RX ADMIN — Medication 50 MICROGRAM(S): at 05:13

## 2022-03-07 RX ADMIN — Medication 1 PATCH: at 19:53

## 2022-03-07 RX ADMIN — OLANZAPINE 5 MILLIGRAM(S): 15 TABLET, FILM COATED ORAL at 10:29

## 2022-03-07 RX ADMIN — DIVALPROEX SODIUM 1000 MILLIGRAM(S): 500 TABLET, DELAYED RELEASE ORAL at 20:44

## 2022-03-07 RX ADMIN — FAMOTIDINE 20 MILLIGRAM(S): 10 INJECTION INTRAVENOUS at 06:14

## 2022-03-07 RX ADMIN — Medication 1 PATCH: at 09:40

## 2022-03-07 RX ADMIN — GABAPENTIN 300 MILLIGRAM(S): 400 CAPSULE ORAL at 20:44

## 2022-03-07 RX ADMIN — Medication 2 MILLIGRAM(S): at 06:34

## 2022-03-07 RX ADMIN — FAMOTIDINE 20 MILLIGRAM(S): 10 INJECTION INTRAVENOUS at 20:44

## 2022-03-07 RX ADMIN — Medication 2 MILLIGRAM(S): at 05:13

## 2022-03-07 RX ADMIN — Medication 2 MILLIGRAM(S): at 09:40

## 2022-03-07 RX ADMIN — GABAPENTIN 300 MILLIGRAM(S): 400 CAPSULE ORAL at 09:39

## 2022-03-07 RX ADMIN — HALOPERIDOL DECANOATE 5 MILLIGRAM(S): 100 INJECTION INTRAMUSCULAR at 20:43

## 2022-03-07 RX ADMIN — DIVALPROEX SODIUM 1000 MILLIGRAM(S): 500 TABLET, DELAYED RELEASE ORAL at 09:39

## 2022-03-07 RX ADMIN — Medication 2 MILLIGRAM(S): at 20:44

## 2022-03-07 RX ADMIN — GABAPENTIN 300 MILLIGRAM(S): 400 CAPSULE ORAL at 14:20

## 2022-03-07 RX ADMIN — SENNA PLUS 2 TABLET(S): 8.6 TABLET ORAL at 20:43

## 2022-03-07 NOTE — PROGRESS NOTE BEHAVIORAL HEALTH - NSBHFUPINTERVALHXFT_PSY_A_CORE
Pt seen and evaluated, chart reviewed. As per nursing report, pt with several episodes of loud outbursts, PRN zyprexa and ativan given. On evaluation, pt presents with improved grooming and calmer than prior evaluation, focused on discharge. She reports she is doing "fine", states she is taking her medications and agrees to adherence after discharge. She reports improved sleep since admission, states she has been sleeping through the night. She denies AVH. She denies paranoia; of note, pt interjects several times regarding her checks, repeatedly stating "she is stealing from me!" She denies SI/HI, intent and plan. Pt adherent to medications, denies negative side effects. Pt visible on unit and more verbally redirectable. Pt endorses improving LLE edema, denies pain. Pt seen and evaluated, chart reviewed. As per nursing report, pt with several episodes of loud outbursts, PRN zyprexa and ativan given. On evaluation, pt presents with improved grooming and calmer than prior evaluation, focused on discharge. She reports she is doing "fine", states she is taking her medications and agrees to adherence after discharge. She reports improved sleep since admission, states she has been sleeping through the night. She denies AVH. She denies paranoia; of note, pt interjects several times regarding her checks, repeatedly stating "she is stealing from me!" She denies SI/HI, intent and plan. Pt adherent to medications, denies negative side effects. Pt visible on unit and more verbally redirectable. Pt endorses improving LLE edema, denies pain. Valproic acid level 103 today.

## 2022-03-07 NOTE — PROGRESS NOTE BEHAVIORAL HEALTH - SUMMARY
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 Guevara outpatient, on Haldol decanoate, zyprexa and Depakote, PMH of hypothyroidism, brought to ED,  activated by residence for making homicidal statements of "I'm going to take a knife and kill my room mate. She stole expensive clothes from me". Psychiatry was consulted for evaluation of homicidal ideation. Her presentation appears consistent with decompensation of schizoaffective d/o bipolar type in setting of medication nonadherence AEB VPA level 42 on admission.     #Schizoaffective d/o, bipolar type  -c/w depakote dr 1000 mg bid, f/u vpa level on 3/7  -titrate haldol dec 200 mg q4 weeks (last haldol dec 150 mg given on 3/1/22) --> give haldol dec 50 mg IM today 3/4/22, c/w haldol 5 mg qhs  -titrate gabapentin 300 mg at tid  -c/w benztropine 2 mg daily    #Hypothyroidism, GERD  -medicine consult --> c/w home medications    #Unilateral LLE 2+ Pitting edema  -medicine consult    #Agitation  -for agitation not amenable to verbal redirection, may give zyprexa 5 mg q8h prn with escalation to IM if pt is a danger to self or/and others. 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 Guevara outpatient, on Haldol decanoate, zyprexa and Depakote, PMH of hypothyroidism, brought to ED,  activated by residence for making homicidal statements of "I'm going to take a knife and kill my room mate. She stole expensive clothes from me". Psychiatry was consulted for evaluation of homicidal ideation. Her presentation appears consistent with decompensation of schizoaffective d/o bipolar type in setting of medication nonadherence AEB VPA level 42 on admission.     #Schizoaffective d/o, bipolar type  -c/w depakote dr 1000 mg bid, vpa level 103 (3/7/22)  -titrate haldol dec 200 mg q4 weeks (last haldol dec 150 mg given on 3/1/22) --> give haldol dec 50 mg IM today 3/4/22, c/w haldol 5 mg qhs  -titrate gabapentin 300 mg at tid  -c/w benztropine 2 mg daily    #Hypothyroidism, GERD  -medicine consult --> c/w home medications    #Unilateral LLE 2+ Pitting edema  -medicine consult    #Agitation  -for agitation not amenable to verbal redirection, may give zyprexa 5 mg q8h prn with escalation to IM if pt is a danger to self or/and others.

## 2022-03-08 PROCEDURE — 99231 SBSQ HOSP IP/OBS SF/LOW 25: CPT

## 2022-03-08 RX ADMIN — Medication 2 MILLIGRAM(S): at 10:39

## 2022-03-08 RX ADMIN — Medication 1 PATCH: at 08:10

## 2022-03-08 RX ADMIN — Medication 50 MICROGRAM(S): at 05:45

## 2022-03-08 RX ADMIN — Medication 2 MILLIGRAM(S): at 08:09

## 2022-03-08 RX ADMIN — Medication 400 MILLIGRAM(S): at 10:39

## 2022-03-08 RX ADMIN — Medication 1 PATCH: at 10:20

## 2022-03-08 RX ADMIN — DIVALPROEX SODIUM 1000 MILLIGRAM(S): 500 TABLET, DELAYED RELEASE ORAL at 09:30

## 2022-03-08 RX ADMIN — HALOPERIDOL DECANOATE 5 MILLIGRAM(S): 100 INJECTION INTRAMUSCULAR at 20:23

## 2022-03-08 RX ADMIN — Medication 1 PATCH: at 18:48

## 2022-03-08 RX ADMIN — Medication 2 MILLIGRAM(S): at 20:56

## 2022-03-08 RX ADMIN — GABAPENTIN 300 MILLIGRAM(S): 400 CAPSULE ORAL at 08:09

## 2022-03-08 RX ADMIN — Medication 400 MILLIGRAM(S): at 11:16

## 2022-03-08 RX ADMIN — Medication 50 MILLIGRAM(S): at 12:37

## 2022-03-08 RX ADMIN — GABAPENTIN 300 MILLIGRAM(S): 400 CAPSULE ORAL at 13:09

## 2022-03-08 RX ADMIN — Medication 1 PATCH: at 07:46

## 2022-03-08 RX ADMIN — Medication 2 MILLIGRAM(S): at 20:23

## 2022-03-08 RX ADMIN — DIVALPROEX SODIUM 1000 MILLIGRAM(S): 500 TABLET, DELAYED RELEASE ORAL at 20:22

## 2022-03-08 RX ADMIN — FAMOTIDINE 20 MILLIGRAM(S): 10 INJECTION INTRAVENOUS at 08:09

## 2022-03-08 RX ADMIN — FAMOTIDINE 20 MILLIGRAM(S): 10 INJECTION INTRAVENOUS at 20:22

## 2022-03-08 RX ADMIN — GABAPENTIN 300 MILLIGRAM(S): 400 CAPSULE ORAL at 20:22

## 2022-03-08 RX ADMIN — SENNA PLUS 2 TABLET(S): 8.6 TABLET ORAL at 20:22

## 2022-03-08 NOTE — PROGRESS NOTE BEHAVIORAL HEALTH - NSBHFUPINTERVALHXFT_PSY_A_CORE
Pt seen and evaluated, chart reviewed. As per nursing report, no acute events overnight, no PRNs. On evaluation, pt presents well-groomed and discharge-focused, continues to be intrusive however more easily redirectable. She reports she is doing "good", endorses good sleep and appetite. She denies AVH. She denies paranoia; of note, pt continues to perseverate that her checks are being stolen and requesting this writer to confirm she will get her March check. She denies SI/HI, intent and plan. Pt adherent to medications, denies negative side effects. Pt visible on unit. Pt endorses improving LLE edema, denies pain. Spoke with pt's OP psychiatrist, Dr. Guevara- reports pt has been calling his office 3-4x per day, offering "bribes" of vodka/fruits and being sexually inappropriate.

## 2022-03-08 NOTE — PROGRESS NOTE BEHAVIORAL HEALTH - SUMMARY
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 Guevara outpatient, on Haldol decanoate, zyprexa and Depakote, PMH of hypothyroidism, brought to ED,  activated by residence for making homicidal statements of "I'm going to take a knife and kill my room mate. She stole expensive clothes from me". Psychiatry was consulted for evaluation of homicidal ideation. Her presentation appears consistent with decompensation of schizoaffective d/o bipolar type in setting of medication nonadherence AEB VPA level 42 on admission.     #Schizoaffective d/o, bipolar type  -c/w depakote dr 1000 mg bid, vpa level 103 (3/7/22)  -titrate haldol dec 200 mg q4 weeks (last haldol dec 150 mg given on 3/1/22) --> give haldol dec 50 mg IM today 3/4/22, c/w haldol 5 mg qhs  -titrate gabapentin 300 mg at tid  -c/w benztropine 2 mg daily    #Hypothyroidism, GERD  -medicine consult --> c/w home medications    #Unilateral LLE 2+ Pitting edema  -medicine consult    #Agitation  -for agitation not amenable to verbal redirection, may give zyprexa 5 mg q8h prn with escalation to IM if pt is a danger to self or/and others.

## 2022-03-09 PROCEDURE — 99231 SBSQ HOSP IP/OBS SF/LOW 25: CPT

## 2022-03-09 RX ORDER — GABAPENTIN 400 MG/1
400 CAPSULE ORAL THREE TIMES A DAY
Refills: 0 | Status: DISCONTINUED | OUTPATIENT
Start: 2022-03-09 | End: 2022-03-17

## 2022-03-09 RX ORDER — NICOTINE POLACRILEX 2 MG
2 GUM BUCCAL
Refills: 0 | Status: DISCONTINUED | OUTPATIENT
Start: 2022-03-09 | End: 2022-03-17

## 2022-03-09 RX ADMIN — SENNA PLUS 2 TABLET(S): 8.6 TABLET ORAL at 21:03

## 2022-03-09 RX ADMIN — Medication 2 MILLIGRAM(S): at 20:13

## 2022-03-09 RX ADMIN — Medication 2 MILLIGRAM(S): at 08:04

## 2022-03-09 RX ADMIN — Medication 50 MICROGRAM(S): at 05:30

## 2022-03-09 RX ADMIN — FAMOTIDINE 20 MILLIGRAM(S): 10 INJECTION INTRAVENOUS at 08:04

## 2022-03-09 RX ADMIN — GABAPENTIN 300 MILLIGRAM(S): 400 CAPSULE ORAL at 11:57

## 2022-03-09 RX ADMIN — GABAPENTIN 300 MILLIGRAM(S): 400 CAPSULE ORAL at 08:04

## 2022-03-09 RX ADMIN — DIVALPROEX SODIUM 1000 MILLIGRAM(S): 500 TABLET, DELAYED RELEASE ORAL at 08:04

## 2022-03-09 RX ADMIN — GABAPENTIN 400 MILLIGRAM(S): 400 CAPSULE ORAL at 20:13

## 2022-03-09 RX ADMIN — Medication 2 MILLIGRAM(S): at 11:26

## 2022-03-09 RX ADMIN — DIVALPROEX SODIUM 1000 MILLIGRAM(S): 500 TABLET, DELAYED RELEASE ORAL at 20:13

## 2022-03-09 RX ADMIN — Medication 2 MILLIGRAM(S): at 08:06

## 2022-03-09 RX ADMIN — Medication 50 MILLIGRAM(S): at 23:06

## 2022-03-09 RX ADMIN — Medication 2 MILLIGRAM(S): at 15:45

## 2022-03-09 RX ADMIN — Medication 1 PATCH: at 08:04

## 2022-03-09 RX ADMIN — FAMOTIDINE 20 MILLIGRAM(S): 10 INJECTION INTRAVENOUS at 20:13

## 2022-03-09 RX ADMIN — Medication 400 MILLIGRAM(S): at 11:25

## 2022-03-09 RX ADMIN — Medication 2 MILLIGRAM(S): at 20:03

## 2022-03-09 RX ADMIN — HALOPERIDOL DECANOATE 5 MILLIGRAM(S): 100 INJECTION INTRAMUSCULAR at 20:13

## 2022-03-09 NOTE — PROGRESS NOTE BEHAVIORAL HEALTH - NSBHFUPINTERVALHXFT_PSY_A_CORE
Pt seen and evaluated, chart reviewed. As per nursing report, pt c/o anxiety, PRN ativan given with fair results. On evaluation, pt presents well-groomed and discharge-focused, continues to be intrusive however more easily redirectable. She states "all is fine"; endorses good mood, sleep and appetite. She denies AVH, paranoia. She continues to report concern that her checks are being stolen and perseverates on discharge. She denies SI/HI, intent and plan. Pt adherent to medications, denies negative side effects. Pt endorses improving LLE edema, denies pain. Pt visible on unit with improved behavioral control.

## 2022-03-09 NOTE — PROGRESS NOTE BEHAVIORAL HEALTH - SUMMARY
37 y/o female, single, domiciled at 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 Guevara outpatient, on Haldol decanoate, zyprexa and Depakote, PMH of hypothyroidism, brought to ED,  activated by residence for making homicidal statements of "I'm going to take a knife and kill my room mate. She stole expensive clothes from me". Psychiatry was consulted for evaluation of homicidal ideation. Her presentation appears consistent with decompensation of schizoaffective d/o bipolar type in setting of medication nonadherence AEB VPA level 42 on admission. On evaluation, pt with improved     #Schizoaffective d/o, bipolar type  -c/w depakote dr 1000 mg bid, vpa level 103 (3/7/22)  -titrate haldol dec 200 mg q4 weeks (last haldol dec 150 mg given on 3/1/22) --> give haldol dec 50 mg IM today 3/4/22, c/w haldol 5 mg qhs  -titrate gabapentin 300 mg at tid --> titrate gabapentin 400 mg tid (3/9)  -c/w benztropine 2 mg daily    #Hypothyroidism, GERD  -medicine consult --> c/w home medications    #Unilateral LLE 2+ Pitting edema  -improving  -medicine consult    #Agitation  -for agitation not amenable to verbal redirection, may give thorazine 50 mg q8h prn with escalation to IM if pt is a danger to self or/and others

## 2022-03-10 PROCEDURE — 99231 SBSQ HOSP IP/OBS SF/LOW 25: CPT

## 2022-03-10 RX ADMIN — Medication 50 MILLIGRAM(S): at 16:42

## 2022-03-10 RX ADMIN — GABAPENTIN 400 MILLIGRAM(S): 400 CAPSULE ORAL at 08:01

## 2022-03-10 RX ADMIN — DIVALPROEX SODIUM 1000 MILLIGRAM(S): 500 TABLET, DELAYED RELEASE ORAL at 08:02

## 2022-03-10 RX ADMIN — Medication 1 PATCH: at 08:02

## 2022-03-10 RX ADMIN — Medication 2 MILLIGRAM(S): at 19:33

## 2022-03-10 RX ADMIN — FAMOTIDINE 20 MILLIGRAM(S): 10 INJECTION INTRAVENOUS at 08:02

## 2022-03-10 RX ADMIN — Medication 1 PATCH: at 09:11

## 2022-03-10 RX ADMIN — Medication 2 MILLIGRAM(S): at 16:26

## 2022-03-10 RX ADMIN — HALOPERIDOL DECANOATE 5 MILLIGRAM(S): 100 INJECTION INTRAMUSCULAR at 21:49

## 2022-03-10 RX ADMIN — FAMOTIDINE 20 MILLIGRAM(S): 10 INJECTION INTRAVENOUS at 16:42

## 2022-03-10 RX ADMIN — GABAPENTIN 400 MILLIGRAM(S): 400 CAPSULE ORAL at 22:05

## 2022-03-10 RX ADMIN — Medication 2 MILLIGRAM(S): at 08:02

## 2022-03-10 RX ADMIN — SENNA PLUS 2 TABLET(S): 8.6 TABLET ORAL at 21:49

## 2022-03-10 RX ADMIN — Medication 50 MICROGRAM(S): at 06:16

## 2022-03-10 RX ADMIN — DIVALPROEX SODIUM 1000 MILLIGRAM(S): 500 TABLET, DELAYED RELEASE ORAL at 21:49

## 2022-03-10 RX ADMIN — GABAPENTIN 400 MILLIGRAM(S): 400 CAPSULE ORAL at 14:23

## 2022-03-10 NOTE — PROGRESS NOTE BEHAVIORAL HEALTH - NSBHFUPINTERVALHXFT_PSY_A_CORE
Pt seen and evaluated during treatment team, chart reviewed. As per nursing report, pt became agitated with roommate over bathroom, PRN ativan and thorazine given with fair results. On evaluation, pt presents less intrusive and discharge-focused, reports she is doing well and ready for discharge. She states she wants to return to [facility] so she can transition back to her former residence, [facility]. She also states she wants to be discharged because she wants to go to [place] to play the slot machine, states "I'll make a billion dollars". She reports mood "good", states she slept well throughout the night. She reports good appetite, regular BMs. She denies AVH. Denies others are trying to harm her, however perseverates that her SW is stealing her monthly checks and repeatedly requests for this writer to ensure her check is safe. She denies SI/HI, intent and plan. Pt adherent to medications, denies negative side effects. Pt with some improved insight, verbalizes benefits of adhering to medication regimen, states depakote "keeps me stable". Pt endorses improving LLE edema, denies pain. Pt visible on unit and in groups.

## 2022-03-10 NOTE — PROGRESS NOTE BEHAVIORAL HEALTH - SUMMARY
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 Guevara outpatient, on Haldol decanoate, zyprexa and Depakote, PMH of hypothyroidism, brought to ED,  activated by residence for making homicidal statements of "I'm going to take a knife and kill my room mate. She stole expensive clothes from me". Psychiatry was consulted for evaluation of homicidal ideation. Her presentation appears consistent with decompensation of schizoaffective d/o bipolar type in setting of medication nonadherence AEB VPA level 42 on admission. On evaluation, pt with improved     #Schizoaffective d/o, bipolar type  -c/w depakote dr 1000 mg bid, vpa level 103 (3/7/22)  -titrate haldol dec 200 mg q4 weeks (last haldol dec 150 mg given on 3/1/22) --> give haldol dec 50 mg IM today 3/4/22, c/w haldol 5 mg qhs  -titrate gabapentin 300 mg at tid --> titrate gabapentin 400 mg tid (3/9)  -c/w benztropine 2 mg daily    #Hypothyroidism, GERD  -medicine consult --> c/w home medications    #Unilateral LLE 2+ Pitting edema  -improving  -medicine consult    #Agitation  -for agitation not amenable to verbal redirection, may give thorazine 50 mg q8h prn with escalation to IM if pt is a danger to self or/and others

## 2022-03-11 PROCEDURE — 99231 SBSQ HOSP IP/OBS SF/LOW 25: CPT

## 2022-03-11 RX ADMIN — SENNA PLUS 2 TABLET(S): 8.6 TABLET ORAL at 20:36

## 2022-03-11 RX ADMIN — DIVALPROEX SODIUM 1000 MILLIGRAM(S): 500 TABLET, DELAYED RELEASE ORAL at 20:36

## 2022-03-11 RX ADMIN — GABAPENTIN 400 MILLIGRAM(S): 400 CAPSULE ORAL at 08:41

## 2022-03-11 RX ADMIN — Medication 2 MILLIGRAM(S): at 08:42

## 2022-03-11 RX ADMIN — Medication 2 MILLIGRAM(S): at 22:24

## 2022-03-11 RX ADMIN — Medication 1 PATCH: at 08:43

## 2022-03-11 RX ADMIN — HALOPERIDOL DECANOATE 5 MILLIGRAM(S): 100 INJECTION INTRAMUSCULAR at 20:36

## 2022-03-11 RX ADMIN — Medication 50 MICROGRAM(S): at 06:39

## 2022-03-11 RX ADMIN — GABAPENTIN 400 MILLIGRAM(S): 400 CAPSULE ORAL at 20:35

## 2022-03-11 RX ADMIN — Medication 2 MILLIGRAM(S): at 06:44

## 2022-03-11 RX ADMIN — Medication 1 PATCH: at 07:23

## 2022-03-11 RX ADMIN — DIVALPROEX SODIUM 1000 MILLIGRAM(S): 500 TABLET, DELAYED RELEASE ORAL at 08:41

## 2022-03-11 RX ADMIN — Medication 2 MILLIGRAM(S): at 12:27

## 2022-03-11 RX ADMIN — FAMOTIDINE 20 MILLIGRAM(S): 10 INJECTION INTRAVENOUS at 08:41

## 2022-03-11 RX ADMIN — Medication 2 MILLIGRAM(S): at 18:11

## 2022-03-11 RX ADMIN — GABAPENTIN 400 MILLIGRAM(S): 400 CAPSULE ORAL at 13:37

## 2022-03-11 RX ADMIN — FAMOTIDINE 20 MILLIGRAM(S): 10 INJECTION INTRAVENOUS at 20:36

## 2022-03-11 RX ADMIN — Medication 2 MILLIGRAM(S): at 10:15

## 2022-03-11 NOTE — PROGRESS NOTE BEHAVIORAL HEALTH - NSBHFUPINTERVALHXFT_PSY_A_CORE
Pt seen and evaluated, chart reviewed. As per nursing report, no acute events overnight. On evaluation, pt presents well-groomed, less intrusive and discharge-focused, reports she is doing well and denies all psychiatric complaints. She reports mood "good", endorses she slept through the night with good appetite, regular BMs. She denies AVH. She denies paranoia, states she feels safe to return to [facility] so she can go back to her former residence, [facility]. She continues to perseverate that her SW is stealing her monthly checks and repeatedly requests for this writer to ensure her check is safe. She denies SI/HI, intent and plan. Pt adherent to medications, denies negative side effects. Pt endorses improving LLE edema, denies pain. Pt visible on unit and in groups.

## 2022-03-11 NOTE — PROGRESS NOTE BEHAVIORAL HEALTH - SUMMARY
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 Guevara outpatient, on Haldol decanoate, zyprexa and Depakote, PMH of hypothyroidism, brought to ED,  activated by residence for making homicidal statements of "I'm going to take a knife and kill my room mate. She stole expensive clothes from me". Psychiatry was consulted for evaluation of homicidal ideation. Her presentation appears consistent with decompensation of schizoaffective d/o bipolar type in setting of medication nonadherence AEB VPA level 42 on admission.     #Schizoaffective d/o, bipolar type  -c/w depakote dr 1000 mg bid, vpa level 103 (3/7/22)  -titrate haldol dec 200 mg q4 weeks (last haldol dec 150 mg given on 3/1/22) --> give haldol dec 50 mg IM today 3/4/22, c/w haldol 5 mg qhs  -titrate gabapentin 300 mg at tid --> titrate gabapentin 400 mg tid (3/9)  -c/w benztropine 2 mg daily    #Hypothyroidism, GERD  -medicine consult --> c/w home medications    #Unilateral LLE 2+ Pitting edema  -improving  -medicine consult    #Agitation  -for agitation not amenable to verbal redirection, may give thorazine 50 mg q8h prn with escalation to IM if pt is a danger to self or/and others

## 2022-03-12 RX ADMIN — GABAPENTIN 400 MILLIGRAM(S): 400 CAPSULE ORAL at 08:17

## 2022-03-12 RX ADMIN — HALOPERIDOL DECANOATE 5 MILLIGRAM(S): 100 INJECTION INTRAMUSCULAR at 20:08

## 2022-03-12 RX ADMIN — GABAPENTIN 400 MILLIGRAM(S): 400 CAPSULE ORAL at 20:08

## 2022-03-12 RX ADMIN — FAMOTIDINE 20 MILLIGRAM(S): 10 INJECTION INTRAVENOUS at 08:17

## 2022-03-12 RX ADMIN — Medication 2 MILLIGRAM(S): at 22:48

## 2022-03-12 RX ADMIN — Medication 2 MILLIGRAM(S): at 22:09

## 2022-03-12 RX ADMIN — DIVALPROEX SODIUM 1000 MILLIGRAM(S): 500 TABLET, DELAYED RELEASE ORAL at 20:08

## 2022-03-12 RX ADMIN — Medication 1 PATCH: at 08:53

## 2022-03-12 RX ADMIN — Medication 2 MILLIGRAM(S): at 19:28

## 2022-03-12 RX ADMIN — FAMOTIDINE 20 MILLIGRAM(S): 10 INJECTION INTRAVENOUS at 20:08

## 2022-03-12 RX ADMIN — Medication 50 MICROGRAM(S): at 06:34

## 2022-03-12 RX ADMIN — Medication 1 PATCH: at 08:17

## 2022-03-12 RX ADMIN — Medication 1 PATCH: at 08:52

## 2022-03-12 RX ADMIN — Medication 2 MILLIGRAM(S): at 13:25

## 2022-03-12 RX ADMIN — Medication 2 MILLIGRAM(S): at 08:17

## 2022-03-12 RX ADMIN — SENNA PLUS 2 TABLET(S): 8.6 TABLET ORAL at 20:08

## 2022-03-12 RX ADMIN — Medication 1 PATCH: at 20:09

## 2022-03-12 RX ADMIN — GABAPENTIN 400 MILLIGRAM(S): 400 CAPSULE ORAL at 13:25

## 2022-03-12 RX ADMIN — DIVALPROEX SODIUM 1000 MILLIGRAM(S): 500 TABLET, DELAYED RELEASE ORAL at 08:17

## 2022-03-13 RX ADMIN — GABAPENTIN 400 MILLIGRAM(S): 400 CAPSULE ORAL at 08:14

## 2022-03-13 RX ADMIN — Medication 50 MICROGRAM(S): at 06:35

## 2022-03-13 RX ADMIN — Medication 1 PATCH: at 08:15

## 2022-03-13 RX ADMIN — DIVALPROEX SODIUM 1000 MILLIGRAM(S): 500 TABLET, DELAYED RELEASE ORAL at 20:01

## 2022-03-13 RX ADMIN — GABAPENTIN 400 MILLIGRAM(S): 400 CAPSULE ORAL at 20:02

## 2022-03-13 RX ADMIN — FAMOTIDINE 20 MILLIGRAM(S): 10 INJECTION INTRAVENOUS at 20:01

## 2022-03-13 RX ADMIN — Medication 1 PATCH: at 18:51

## 2022-03-13 RX ADMIN — DIVALPROEX SODIUM 1000 MILLIGRAM(S): 500 TABLET, DELAYED RELEASE ORAL at 08:14

## 2022-03-13 RX ADMIN — Medication 50 MILLIGRAM(S): at 15:20

## 2022-03-13 RX ADMIN — GABAPENTIN 400 MILLIGRAM(S): 400 CAPSULE ORAL at 15:17

## 2022-03-13 RX ADMIN — FAMOTIDINE 20 MILLIGRAM(S): 10 INJECTION INTRAVENOUS at 08:15

## 2022-03-13 RX ADMIN — Medication 2 MILLIGRAM(S): at 15:20

## 2022-03-13 RX ADMIN — HALOPERIDOL DECANOATE 5 MILLIGRAM(S): 100 INJECTION INTRAMUSCULAR at 20:01

## 2022-03-13 RX ADMIN — SENNA PLUS 2 TABLET(S): 8.6 TABLET ORAL at 20:01

## 2022-03-13 RX ADMIN — Medication 2 MILLIGRAM(S): at 08:14

## 2022-03-14 PROCEDURE — 99231 SBSQ HOSP IP/OBS SF/LOW 25: CPT

## 2022-03-14 RX ORDER — CHLORPROMAZINE HCL 10 MG
50 TABLET ORAL AT BEDTIME
Refills: 0 | Status: DISCONTINUED | OUTPATIENT
Start: 2022-03-14 | End: 2022-03-17

## 2022-03-14 RX ADMIN — HALOPERIDOL DECANOATE 5 MILLIGRAM(S): 100 INJECTION INTRAMUSCULAR at 20:23

## 2022-03-14 RX ADMIN — Medication 2 MILLIGRAM(S): at 08:02

## 2022-03-14 RX ADMIN — GABAPENTIN 400 MILLIGRAM(S): 400 CAPSULE ORAL at 08:02

## 2022-03-14 RX ADMIN — DIVALPROEX SODIUM 1000 MILLIGRAM(S): 500 TABLET, DELAYED RELEASE ORAL at 20:22

## 2022-03-14 RX ADMIN — Medication 400 MILLIGRAM(S): at 10:09

## 2022-03-14 RX ADMIN — GABAPENTIN 400 MILLIGRAM(S): 400 CAPSULE ORAL at 20:23

## 2022-03-14 RX ADMIN — DIVALPROEX SODIUM 1000 MILLIGRAM(S): 500 TABLET, DELAYED RELEASE ORAL at 08:01

## 2022-03-14 RX ADMIN — GABAPENTIN 400 MILLIGRAM(S): 400 CAPSULE ORAL at 14:19

## 2022-03-14 RX ADMIN — Medication 2 MILLIGRAM(S): at 10:09

## 2022-03-14 RX ADMIN — SENNA PLUS 2 TABLET(S): 8.6 TABLET ORAL at 20:22

## 2022-03-14 RX ADMIN — Medication 1 PATCH: at 08:01

## 2022-03-14 RX ADMIN — Medication 50 MICROGRAM(S): at 06:01

## 2022-03-14 RX ADMIN — FAMOTIDINE 20 MILLIGRAM(S): 10 INJECTION INTRAVENOUS at 20:22

## 2022-03-14 RX ADMIN — FAMOTIDINE 20 MILLIGRAM(S): 10 INJECTION INTRAVENOUS at 08:03

## 2022-03-14 RX ADMIN — Medication 400 MILLIGRAM(S): at 11:26

## 2022-03-14 RX ADMIN — Medication 50 MILLIGRAM(S): at 20:23

## 2022-03-14 NOTE — PROGRESS NOTE BEHAVIORAL HEALTH - PRN MEDS
olanzapine 5mg in AM for agitation due to not getting nicotine gum fast enough.
ativan, thorazine
ativan, thorazine
zyprexa, ativan
ativan, thorazine
ativan
ativan

## 2022-03-14 NOTE — PROGRESS NOTE BEHAVIORAL HEALTH - SUMMARY
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 Guevara outpatient, on Haldol decanoate, zyprexa and Depakote, PMH of hypothyroidism, brought to ED,  activated by residence for making homicidal statements of "I'm going to take a knife and kill my room mate. She stole expensive clothes from me". Psychiatry was consulted for evaluation of homicidal ideation. Her presentation appears consistent with decompensation of schizoaffective d/o bipolar type in setting of medication nonadherence AEB VPA level 42 on admission.     #Schizoaffective d/o, bipolar type  -c/w depakote dr 1000 mg bid, vpa level 103 (3/7/22)  -titrate haldol dec 200 mg q4 weeks (last haldol dec 150 mg given on 3/1/22) --> give haldol dec 50 mg IM today 3/4/22, c/w haldol 5 mg qhs  -titrate gabapentin 300 mg at tid --> titrate gabapentin 400 mg tid (3/9)  -c/w benztropine 2 mg daily  -start thorazine 50 mg qhs (3/14)    #Hypothyroidism, GERD  -medicine consult --> c/w home medications    #Unilateral LLE 2+ Pitting edema  -improving  -medicine consult    #Agitation  -for agitation not amenable to verbal redirection, may give thorazine 50 mg q8h prn with escalation to IM if pt is a danger to self or/and others

## 2022-03-14 NOTE — PROGRESS NOTE BEHAVIORAL HEALTH - NSBHFUPINTERVALHXFT_PSY_A_CORE
Pt seen and evaluated, chart reviewed. As per nursing report, pt agitated overnight, PRN thorazine with good effects. On evaluation, pt presents singing British songs in hallway, well-groomed and greets this writer appropriately. She states she feels "very good" and is looking forward to going back to [facility] to transition back to [facility]. She reports she is sleeping through the night and eating well. She denies AVH. She denies paranoia. Of note, pt previously perseverated on that her SW is stealing her monthly checks; today, pt denies belief that her checks her being stolen, reports she feels safe returning back to [facility]. She denies SI/HI, intent and plan. Pt adherent to medications, denies negative side effects. Pt endorses improving LLE edema, denies pain. Pt visible on unit and in groups.

## 2022-03-15 ENCOUNTER — NON-APPOINTMENT (OUTPATIENT)
Age: 37
End: 2022-03-15

## 2022-03-15 PROCEDURE — 99231 SBSQ HOSP IP/OBS SF/LOW 25: CPT

## 2022-03-15 RX ADMIN — Medication 400 MILLIGRAM(S): at 09:13

## 2022-03-15 RX ADMIN — GABAPENTIN 400 MILLIGRAM(S): 400 CAPSULE ORAL at 20:02

## 2022-03-15 RX ADMIN — Medication 400 MILLIGRAM(S): at 17:15

## 2022-03-15 RX ADMIN — HALOPERIDOL DECANOATE 5 MILLIGRAM(S): 100 INJECTION INTRAMUSCULAR at 20:02

## 2022-03-15 RX ADMIN — GABAPENTIN 400 MILLIGRAM(S): 400 CAPSULE ORAL at 13:36

## 2022-03-15 RX ADMIN — DIVALPROEX SODIUM 1000 MILLIGRAM(S): 500 TABLET, DELAYED RELEASE ORAL at 20:02

## 2022-03-15 RX ADMIN — Medication 1 PATCH: at 07:17

## 2022-03-15 RX ADMIN — DIVALPROEX SODIUM 1000 MILLIGRAM(S): 500 TABLET, DELAYED RELEASE ORAL at 08:02

## 2022-03-15 RX ADMIN — Medication 50 MICROGRAM(S): at 07:00

## 2022-03-15 RX ADMIN — Medication 1 PATCH: at 18:29

## 2022-03-15 RX ADMIN — Medication 1 PATCH: at 08:01

## 2022-03-15 RX ADMIN — SENNA PLUS 2 TABLET(S): 8.6 TABLET ORAL at 20:02

## 2022-03-15 RX ADMIN — Medication 50 MILLIGRAM(S): at 20:02

## 2022-03-15 RX ADMIN — FAMOTIDINE 20 MILLIGRAM(S): 10 INJECTION INTRAVENOUS at 20:02

## 2022-03-15 RX ADMIN — Medication 1 PATCH: at 08:03

## 2022-03-15 RX ADMIN — GABAPENTIN 400 MILLIGRAM(S): 400 CAPSULE ORAL at 08:02

## 2022-03-15 RX ADMIN — Medication 2 MILLIGRAM(S): at 08:02

## 2022-03-15 RX ADMIN — Medication 400 MILLIGRAM(S): at 10:10

## 2022-03-15 RX ADMIN — Medication 400 MILLIGRAM(S): at 16:21

## 2022-03-15 RX ADMIN — FAMOTIDINE 20 MILLIGRAM(S): 10 INJECTION INTRAVENOUS at 08:02

## 2022-03-15 NOTE — PROGRESS NOTE BEHAVIORAL HEALTH - NSBHFUPINTERVALHXFT_PSY_A_CORE
Pt seen and evaluated, chart reviewed. As per nursing report, no acute events overnight, no PRNs. On evaluation, pt presents pleasant and appropriate, shows this writer a drawing she did in group yesterday. She endorses mood "good", reports good sleep and appetite, regular BMs. She denies AVH. She denies paranoia, denies others are stealing her clothes/checks, states she feels safe and looks forward to returning to [facility]. She denies SI/HI, intent and plan. Pt adherent to medications, denies negative side effects. Pt endorses improving LLE edema, denies pain. Pt visible on unit and in groups. Pt in behavioral control.

## 2022-03-15 NOTE — PROGRESS NOTE BEHAVIORAL HEALTH - SUMMARY
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 Guevara outpatient, on Haldol decanoate, zyprexa and Depakote, PMH of hypothyroidism, brought to ED,  activated by residence for making homicidal statements of "I'm going to take a knife and kill my room mate. She stole expensive clothes from me". Psychiatry was consulted for evaluation of homicidal ideation. Her presentation appears consistent with decompensation of schizoaffective d/o bipolar type in setting of medication nonadherence AEB VPA level 42 on admission. On evaluation, pt presents pleasant and cooperative, less intrusive with improved behavioral control. She reports good mood and sleep with no overt psychosis noted.     #Schizoaffective d/o, bipolar type  -c/w depakote dr 1000 mg bid, vpa level 103 (3/7/22)  -titrate haldol dec 200 mg q4 weeks (last haldol dec 150 mg given on 3/1/22) --> give haldol dec 50 mg IM today 3/4/22, c/w haldol 5 mg qhs; next haldol dec 200 mg IM due on 3/29/22  -titrate gabapentin 300 mg at tid --> titrate gabapentin 400 mg tid (3/9)  -c/w benztropine 2 mg daily  -start thorazine 50 mg qhs (3/14)    #Hypothyroidism, GERD  -medicine consult --> c/w home medications    #Unilateral LLE 2+ Pitting edema  -improving  -medicine consult    #Agitation  -for agitation not amenable to verbal redirection, may give thorazine 50 mg q8h prn with escalation to IM if pt is a danger to self or/and others

## 2022-03-16 LAB — VALPROATE SERPL-MCNC: 72 UG/ML — SIGNIFICANT CHANGE UP (ref 50–100)

## 2022-03-16 PROCEDURE — 99231 SBSQ HOSP IP/OBS SF/LOW 25: CPT

## 2022-03-16 RX ORDER — DIVALPROEX SODIUM 500 MG/1
2 TABLET, DELAYED RELEASE ORAL
Qty: 56 | Refills: 0
Start: 2022-03-16 | End: 2022-03-29

## 2022-03-16 RX ORDER — NICOTINE POLACRILEX 2 MG
1 GUM BUCCAL
Qty: 14 | Refills: 0
Start: 2022-03-16 | End: 2022-03-29

## 2022-03-16 RX ORDER — HALOPERIDOL DECANOATE 100 MG/ML
150 INJECTION INTRAMUSCULAR
Qty: 0 | Refills: 0 | DISCHARGE

## 2022-03-16 RX ORDER — CHLORPROMAZINE HCL 10 MG
1 TABLET ORAL
Qty: 14 | Refills: 0
Start: 2022-03-16 | End: 2022-03-29

## 2022-03-16 RX ORDER — FAMOTIDINE 10 MG/ML
1 INJECTION INTRAVENOUS
Qty: 28 | Refills: 0
Start: 2022-03-16 | End: 2022-03-29

## 2022-03-16 RX ORDER — LEVOTHYROXINE SODIUM 125 MCG
1 TABLET ORAL
Qty: 14 | Refills: 0
Start: 2022-03-16 | End: 2022-03-29

## 2022-03-16 RX ORDER — HALOPERIDOL DECANOATE 100 MG/ML
1 INJECTION INTRAMUSCULAR
Qty: 14 | Refills: 0
Start: 2022-03-16 | End: 2022-03-29

## 2022-03-16 RX ORDER — SENNA PLUS 8.6 MG/1
2 TABLET ORAL
Qty: 28 | Refills: 0
Start: 2022-03-16 | End: 2022-03-29

## 2022-03-16 RX ORDER — GABAPENTIN 400 MG/1
1 CAPSULE ORAL
Qty: 0 | Refills: 0 | DISCHARGE

## 2022-03-16 RX ORDER — BENZTROPINE MESYLATE 1 MG
1 TABLET ORAL
Qty: 14 | Refills: 0
Start: 2022-03-16 | End: 2022-03-29

## 2022-03-16 RX ORDER — HALOPERIDOL DECANOATE 100 MG/ML
200 INJECTION INTRAMUSCULAR
Qty: 0 | Refills: 0 | DISCHARGE

## 2022-03-16 RX ORDER — GABAPENTIN 400 MG/1
1 CAPSULE ORAL
Qty: 42 | Refills: 0
Start: 2022-03-16 | End: 2022-03-29

## 2022-03-16 RX ADMIN — Medication 400 MILLIGRAM(S): at 10:09

## 2022-03-16 RX ADMIN — FAMOTIDINE 20 MILLIGRAM(S): 10 INJECTION INTRAVENOUS at 17:20

## 2022-03-16 RX ADMIN — GABAPENTIN 400 MILLIGRAM(S): 400 CAPSULE ORAL at 20:29

## 2022-03-16 RX ADMIN — GABAPENTIN 400 MILLIGRAM(S): 400 CAPSULE ORAL at 09:10

## 2022-03-16 RX ADMIN — FAMOTIDINE 20 MILLIGRAM(S): 10 INJECTION INTRAVENOUS at 09:10

## 2022-03-16 RX ADMIN — Medication 2 MILLIGRAM(S): at 09:10

## 2022-03-16 RX ADMIN — SENNA PLUS 2 TABLET(S): 8.6 TABLET ORAL at 20:27

## 2022-03-16 RX ADMIN — Medication 2 MILLIGRAM(S): at 16:53

## 2022-03-16 RX ADMIN — Medication 1 PATCH: at 09:09

## 2022-03-16 RX ADMIN — DIVALPROEX SODIUM 1000 MILLIGRAM(S): 500 TABLET, DELAYED RELEASE ORAL at 09:09

## 2022-03-16 RX ADMIN — Medication 50 MICROGRAM(S): at 06:30

## 2022-03-16 RX ADMIN — GABAPENTIN 400 MILLIGRAM(S): 400 CAPSULE ORAL at 12:08

## 2022-03-16 RX ADMIN — Medication 2 MILLIGRAM(S): at 12:03

## 2022-03-16 RX ADMIN — HALOPERIDOL DECANOATE 5 MILLIGRAM(S): 100 INJECTION INTRAMUSCULAR at 20:28

## 2022-03-16 RX ADMIN — Medication 2 MILLIGRAM(S): at 14:30

## 2022-03-16 RX ADMIN — Medication 50 MILLIGRAM(S): at 20:28

## 2022-03-16 RX ADMIN — Medication 2 MILLIGRAM(S): at 09:11

## 2022-03-16 RX ADMIN — DIVALPROEX SODIUM 1000 MILLIGRAM(S): 500 TABLET, DELAYED RELEASE ORAL at 20:28

## 2022-03-16 NOTE — DISCHARGE NOTE BEHAVIORAL HEALTH - NSTOBACCOREFERRAL_GEN_A_NCS
Patient declined information Patient registered and referred to the [place] [facility]. Phone appointment scheduled for 3/19/22 at 0900./Yes Patient registered and referred to the [place] [facility]. Phone appointment scheduled for 3/19/22 at 0900./Patient declined information

## 2022-03-16 NOTE — DISCHARGE NOTE BEHAVIORAL HEALTH - NSBHPSYCHMEDFAILFT_PSY_A_CORE
Pt with hx of multiple failed monotherapy trials (haldol, zyprexa, risperdal), refuses lab monitoring requirements of clozapine. H/o treatment nonadherence and on haldol dec

## 2022-03-16 NOTE — DISCHARGE NOTE BEHAVIORAL HEALTH - NSBHDCHOUSINGFT_PSY_A_CORE
[facility]  [address]  [facility] [id number]

## 2022-03-16 NOTE — PROGRESS NOTE BEHAVIORAL HEALTH - MOOD
Normal
Normal/Anxious
Normal/Anxious
Normal
Normal/Anxious
Normal

## 2022-03-16 NOTE — DISCHARGE NOTE BEHAVIORAL HEALTH - NSBHDCSUBSTHXFT_PSY_A_CORE
As noted above.  Pt reports cigarettes use, 1-2 PPD. She denies recent use of alcohol and cannabis. Denies recent use of other illicit substances.

## 2022-03-16 NOTE — DISCHARGE NOTE BEHAVIORAL HEALTH - NS MD DC FALL RISK RISK
For information on Fall & Injury Prevention, visit: [email or web address] OR  [email or web address] OR  https://www.cdc.gov/steadi/patient.html

## 2022-03-16 NOTE — DISCHARGE NOTE BEHAVIORAL HEALTH - NSBHDCMEDSFT_PSY_A_CORE
Pt given additional haldol dec 50 mg IM on 3/4/22, for total of haldol dec 200 mg q4 weeks (next dose due on 3/29/22). Pt started on haldol 5 mg qhs **to taper as indicated. Titrated gabapentin 400 mg tid. Pt started on thorazine 50 mg q6h PRN for agitation, pt noted with good response; started on thorazine 50 mg qhs. Continued with benzotropine 2 mg daily. Pt tolerated medications well, denied negative side effects.

## 2022-03-16 NOTE — PROGRESS NOTE BEHAVIORAL HEALTH - NSBHCHARTREVIEWVS_PSY_A_CORE FT
Vital Signs Last 24 Hrs  T(C): 35.9 (04 Mar 2022 07:39), Max: 37.1 (03 Mar 2022 15:43)  T(F): 96.6 (04 Mar 2022 07:39), Max: 98.8 (03 Mar 2022 15:43)  HR: 81 (04 Mar 2022 07:39) (81 - 95)  BP: 99/65 (04 Mar 2022 07:39) (99/55 - 146/85)  BP(mean): --  RR: 16 (04 Mar 2022 07:39) (16 - 20)  SpO2: 99% (03 Mar 2022 15:43) (99% - 99%)
Vital Signs Last 24 Hrs  T(C): 35.8 (11 Mar 2022 10:12), Max: 35.8 (11 Mar 2022 10:12)  T(F): 96.4 (11 Mar 2022 10:12), Max: 96.4 (11 Mar 2022 10:12)  HR: 112 (11 Mar 2022 10:12) (112 - 112)  BP: 121/69 (11 Mar 2022 10:12) (121/69 - 121/69)  BP(mean): --  RR: 20 (11 Mar 2022 10:12) (20 - 20)  SpO2: --
Vital Signs Last 24 Hrs  T(C): 36.7 (16 Mar 2022 08:39), Max: 36.7 (16 Mar 2022 08:39)  T(F): 98 (16 Mar 2022 08:39), Max: 98 (16 Mar 2022 08:39)  HR: 85 (16 Mar 2022 08:39) (77 - 86)  BP: 107/65 (16 Mar 2022 08:39) (107/65 - 127/73)  BP(mean): --  RR: 19 (16 Mar 2022 08:39) (16 - 19)  SpO2: --
Vital Signs Last 24 Hrs  T(C): 35.6 (08 Mar 2022 10:15), Max: 37.1 (08 Mar 2022 06:13)  T(F): 96.1 (08 Mar 2022 10:15), Max: 98.7 (08 Mar 2022 06:13)  HR: 87 (08 Mar 2022 10:15) (76 - 87)  BP: 131/83 (08 Mar 2022 10:15) (103/65 - 131/83)  BP(mean): --  RR: 18 (08 Mar 2022 10:15) (18 - 20)  SpO2: --
Vital Signs Last 24 Hrs  T(C): 35.7 (06 Mar 2022 09:22), Max: 36.7 (05 Mar 2022 15:22)  T(F): 96.2 (06 Mar 2022 09:22), Max: 98.1 (05 Mar 2022 15:22)  HR: 91 (06 Mar 2022 09:22) (88 - 123)  BP: 111/70 (06 Mar 2022 09:22) (104/58 - 114/79)  BP(mean): --  RR: 16 (06 Mar 2022 09:22) (16 - 18)  SpO2: --
Vital Signs Last 24 Hrs  T(C): 42.2 (09 Mar 2022 09:18), Max: 42.2 (09 Mar 2022 09:18)  T(F): 108 (09 Mar 2022 09:18), Max: 108 (09 Mar 2022 09:18)  HR: 108 (09 Mar 2022 09:18) (79 - 108)  BP: 118/72 (09 Mar 2022 09:18) (100/60 - 136/71)  BP(mean): --  RR: 18 (09 Mar 2022 09:18) (16 - 18)  SpO2: --
Vital Signs Last 24 Hrs  T(C): 36.7 (07 Mar 2022 11:38), Max: 37.1 (06 Mar 2022 16:00)  T(F): 98.1 (07 Mar 2022 11:38), Max: 98.8 (06 Mar 2022 16:00)  HR: 88 (07 Mar 2022 11:38) (88 - 99)  BP: 117/68 (07 Mar 2022 11:38) (117/68 - 148/74)  BP(mean): --  RR: 18 (07 Mar 2022 11:38) (18 - 18)  SpO2: --
Vital Signs Last 24 Hrs  T(C): 36.1 (14 Mar 2022 10:34), Max: 36.1 (14 Mar 2022 10:34)  T(F): 96.9 (14 Mar 2022 10:34), Max: 96.9 (14 Mar 2022 10:34)  HR: 91 (14 Mar 2022 10:34) (86 - 91)  BP: 116/65 (14 Mar 2022 10:34) (116/65 - 124/72)  BP(mean): --  RR: 18 (14 Mar 2022 10:34) (18 - 18)  SpO2: --
Vital Signs Last 24 Hrs  T(C): 36.1 (05 Mar 2022 11:01), Max: 36.1 (05 Mar 2022 11:01)  T(F): 96.9 (05 Mar 2022 11:01), Max: 96.9 (05 Mar 2022 11:01)  HR: 91 (05 Mar 2022 11:01) (86 - 91)  BP: 108/60 (05 Mar 2022 11:01) (108/60 - 118/81)  BP(mean): --  RR: 19 (05 Mar 2022 11:01) (1 - 19)  SpO2: --
Vital Signs Last 24 Hrs  T(C): 35.8 (09 Mar 2022 16:33), Max: 35.8 (09 Mar 2022 16:33)  T(F): 96.4 (09 Mar 2022 16:33), Max: 96.4 (09 Mar 2022 16:33)  HR: 86 (09 Mar 2022 16:33) (86 - 86)  BP: 152/90 (09 Mar 2022 16:33) (152/90 - 152/90)  BP(mean): --  RR: 18 (09 Mar 2022 16:33) (18 - 18)  SpO2: --
Vital Signs Last 24 Hrs  T(C): 35.9 (15 Mar 2022 08:50), Max: 36.4 (14 Mar 2022 16:00)  T(F): 96.6 (15 Mar 2022 08:50), Max: 97.5 (14 Mar 2022 16:00)  HR: 102 (15 Mar 2022 08:50) (84 - 102)  BP: 112/78 (15 Mar 2022 08:50) (112/78 - 124/71)  BP(mean): --  RR: 20 (15 Mar 2022 08:50) (16 - 20)  SpO2: --

## 2022-03-16 NOTE — DISCHARGE NOTE BEHAVIORAL HEALTH - NSBHDCMEDICALFT_PSY_A_CORE
Pt with unilateral +2 pitting edema of left lower extremity --> medicine consult, VA duplex/arterial study negative, negative DVT, "Diminished Arterial Flow in L Posterior tibial artery: Outpatient vascular follow up"  Restarted on home medications- synthroid for hypothyroidism, pepcid for GERD

## 2022-03-16 NOTE — PROGRESS NOTE BEHAVIORAL HEALTH - NSBHFUPTYPE_PSY_A_CORE
Inpatient-On Service Note
Inpatient

## 2022-03-16 NOTE — PROGRESS NOTE BEHAVIORAL HEALTH - NSBHATTESTSEENBY_PSY_A_CORE
NP without Attending Psychiatrist
attending Psychiatrist without NP/Trainee
NP without Attending Psychiatrist
attending Psychiatrist without NP/Trainee
NP without Attending Psychiatrist
attending Psychiatrist without NP/Trainee
Attending Psychiatrist supervising NP/Trainee, meeting pt...
NP without Attending Psychiatrist

## 2022-03-16 NOTE — PROGRESS NOTE BEHAVIORAL HEALTH - NSBHROSSYSTEMS_PSY_ALL_CORE
LLE +2 edema
Unilateral LLE 2+ pitting edema

## 2022-03-16 NOTE — DISCHARGE NOTE BEHAVIORAL HEALTH - NSBHDCSWCOMMENTSFT_PSY_A_CORE
"SUBJECTIVE:   Jono Templeton is a 47 year old female presenting with   Chief Complaint   Patient presents with     Urgent Care     Cough     bad cough [id number] worse \"red/yellow phelgm, rib pain from coughing     Symptoms started at the beginning of November with cough that is productive at times, head and chest congestion.  This past week the chest has hurt with coughing (points to sternum) and feels harder to take a full breath at times.  No fevers during illness.     Predisposing factors include:  Non smoker.  No vaping.  No h/o asthma.       OBJECTIVE  BP 94/74 (BP Location: Right arm, Patient Position: Sitting, Cuff Size: Adult Regular)   Pulse 80   Temp 97.5  F (36.4  C) (Tympanic)   LMP 05/08/2018 (Exact Date)   SpO2 97%   Breastfeeding No   GENERAL:  Awake, alert and interactive. No acute distress.  HEENT:   NC/AT, EOMI, clear conjunctiva.  Nose congested.  Oropharynx moist and clear.  TM's dull and EAC's benign.  NECK: supple and free of adenopathy  CHEST:  Lungs are clear, no rhonchi, wheezing or rales. Normal symmetric air entry throughout both lung fields.   HEART:  S1 and S2 normal, no murmurs. Regular rate and rhythm.    CXR - patient declines today      ASSESSMENT/PLAN    ICD-10-CM    1. Respiratory infection J98.8 azithromycin (ZITHROMAX) 250 MG tablet   2. Cough R05 benzonatate (TESSALON) 200 MG capsule       Prolonged head and chest cold that is worsening over the past week.  Will cover with zithromax today.  We discussed the expected course of the illness and symptomatic cares in detail.   Advised to return to care if symptoms not improving as expected, do not resolve completely, or if any new or worsening symptoms develop.    Patient Instructions   Start the antibiotic today.  Return to care if any fevers (a temperature of 100.5 or above) develop after you've been on the antibiotic more than 48 hours.                          "
discharge faxed to  and emailed to [email or web address] 3-17-22 12:00

## 2022-03-16 NOTE — DISCHARGE NOTE BEHAVIORAL HEALTH - NSBHDCVIOLFCTRMIT_PSY_A_CORE
Patient has been treatment and medication compliant, not endorsing any side effects. Patient is future-oriented, looking forward to returning to [employer] and resuming work. Patient with improved insight into events that led up to hospitalization. Patient to use positive coping skills learned during the course of the inpatient hospitalization, eg STOP Skill. Patient verbalized willingness to seek care in moment of crisis. Patient is agreeable that should she have any thoughts of hurting herself or others, she will call 911, return to the ED.

## 2022-03-16 NOTE — PROGRESS NOTE BEHAVIORAL HEALTH - SECONDARY DX1
Nicotine dependence

## 2022-03-16 NOTE — PROGRESS NOTE BEHAVIORAL HEALTH - AFFECT CONGRUENCE
Congruent
Not congruent
Congruent
Not congruent
Congruent
Not congruent

## 2022-03-16 NOTE — PROGRESS NOTE BEHAVIORAL HEALTH - NSBHCHARTREVIEWINVESTIGATE_PSY_A_CORE FT
< from: 12 Lead ECG (01.18.22 @ 13:15) >      Ventricular Rate 86 BPM    Atrial Rate 86 BPM    P-R Interval 118 ms    QRS Duration 74 ms    Q-T Interval 366 ms    QTC Calculation(Bazett) 437 ms    P Axis 54 degrees    R Axis 79 degrees    T Axis 13 degrees    Diagnosis Line Normal sinus rhythm  Normal ECG    < end of copied text >
< from: 12 Lead ECG (03.04.22 @ 11:47) >      Ventricular Rate 83 BPM    Atrial Rate 83 BPM    P-R Interval 114 ms    QRS Duration 74 ms    Q-T Interval 358 ms    QTC Calculation(Bazett) 420 ms    P Axis 53 degrees    R Axis 65 degrees    T Axis 14 degrees    Diagnosis Line Normal sinus rhythm  Poor R wave progression    < end of copied text >
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
## 2022-03-16 NOTE — PROGRESS NOTE BEHAVIORAL HEALTH - NSBHCHARTREVIEWIMAGING_PSY_A_CORE FT
< from: Xray Chest 2 Views (12.13.21 @ 09:55) >    Impression:    Negative study.    < end of copied text >
< from: VA Duplex Lower Ext Vein Scan, Left (03.04.22 @ 11:00) >    Impression:    No evidence of deep venous thrombosis or superficial thrombophlebitis in   the left lower extremity.    < end of copied text >

## 2022-03-16 NOTE — DISCHARGE NOTE BEHAVIORAL HEALTH - MEDICATION SUMMARY - MEDICATIONS TO TAKE
I will START or STAY ON the medications listed below when I get home from the hospital:    divalproex sodium 500 mg oral delayed release tablet  -- 2 tab(s) by mouth 2 times a day x 14 days Continue to take as prescribed until told otherwise by your provider  -- Indication: For Schizoaffective disorder    gabapentin 400 mg oral capsule  -- 1 cap(s) by mouth 3 times a day x 14 days  Continue to take as prescribed until told otherwise by your provider  -- Indication: For Anxiety    chlorproMAZINE 50 mg oral tablet  -- 1 tab(s) by mouth once a day (at bedtime) x 14 days  Continue to take as prescribed until told otherwise by your provider  -- Indication: For Schizoaffective disorde    benztropine 2 mg oral tablet  -- 1 tab(s) by mouth once a day x 14 days  Continue to take as prescribed until told otherwise by your provider  -- Indication: For EPS    haloperidol 5 mg oral tablet  -- 1 tab(s) by mouth once a day (at bedtime) x 14 days  Continue to take as prescribed until told otherwise by your provider  -- Indication: For Schizoaffective disorder    Haldol Decanoate  -- 200 milligram(s) intramuscular every 4 weeks  Continue to take as prescribed until told otherwise by your provider  Last dose given: 03/01/2022  Next dose due: 03/29/2022  -- Indication: For Schizoaffective disorder    famotidine 20 mg oral tablet  -- 1 tab(s) by mouth 2 times a day x 14 days  Continue to take as prescribed until told otherwise by your provider  -- Indication: For GERD    senna oral tablet  -- 2 tab(s) by mouth once a day (at bedtime) x 14 days  Continue to take as prescribed until told otherwise by your provider   -- Indication: For Constipation    nicotine 21 mg/24 hr transdermal film, extended release  -- 1 patch by transdermal patch once a day x 14 days  Continue to take as prescribed until told otherwise by your provider  -- Indication: For Smoking cessation    levothyroxine 50 mcg (0.05 mg) oral tablet  -- 1 tab(s) by mouth once a day x 14 days  Continue to take as prescribed until told otherwise by your provider  -- Indication: For Hypothyroidism

## 2022-03-16 NOTE — PROGRESS NOTE BEHAVIORAL HEALTH - NSBHPTASSESSDT_PSY_A_CORE
07-Mar-2022 09:15
11-Mar-2022 09:15
08-Mar-2022 09:15
15-Mar-2022 09:30
14-Mar-2022 09:30
16-Mar-2022 09:45
10-Mar-2022 09:45
06-Mar-2022 13:32
04-Mar-2022 08:45
05-Mar-2022 12:59
09-Mar-2022 09:15

## 2022-03-16 NOTE — PROGRESS NOTE BEHAVIORAL HEALTH - NSBHADMITIPOBSFT_PSY_A_CORE
As per unit protocol

## 2022-03-16 NOTE — PROGRESS NOTE BEHAVIORAL HEALTH - NSBHCHARTREVIEWLAB_PSY_A_CORE FT
Complete Blood Count + Automated Diff (03.04.22 @ 08:33)   WBC Count: 8.17 K/uL   RBC Count: 5.10 M/uL   Hemoglobin: 15.2 g/dL   Hematocrit: 45.9 %   Mean Cell Volume: 90.0 fL   Mean Cell Hemoglobin: 29.8 pg   Mean Cell Hemoglobin Conc: 33.1 g/dL   Red Cell Distrib Width: 12.3 %   Platelet Count - Automated: 164 K/uL   Auto Neutrophil #: 4.45 K/uL   Auto Lymphocyte #: 2.31 K/uL   Auto Monocyte #: 1.22 K/uL   Auto Eosinophil #: 0.12 K/uL   Auto Basophil #: 0.03 K/uL   Auto Neutrophil %: 54.4  Auto Lymphocyte %: 28.3 %   Auto Monocyte %: 14.9 %   Auto Eosinophil %: 1.5 %   Auto Basophil %: 0.4 %   Auto Immature Granulocyte %: 0.5: (Includes meta, myelo and promyelocytes) %   Nucleated RBC: 0 /100 WBCs   HCG Quantitative, Serum (03.04.22 @ 08:33) HCG Quantitative, Serum: <0.6  Comprehensive Metabolic Panel (03.04.22 @ 08:33)   Sodium, Serum: 137 mmol/L   Potassium, Serum: 4.5 mmol/L   Chloride, Serum: 103 mmol/L   Carbon Dioxide, Serum: 21 mmol/L   Anion Gap, Serum: 13 mmol/L   Blood Urea Nitrogen, Serum: 17 mg/dL   Creatinine, Serum: 0.7 mg/dL   Glucose, Serum: 111 mg/dL   Calcium, Total Serum: 9.2 mg/dL   Protein Total, Serum: 7.3 g/dL   Albumin, Serum: 4.3 g/dL   Bilirubin Total, Serum: 0.3 mg/dL   Alkaline Phosphatase, Serum: 58 U/L   Aspartate Aminotransferase (AST/SGOT): 14 U/L   Alanine Aminotransferase (ALT/SGPT): 11 U/L   eGFR: 113  Lipid Profile (03.04.22 @ 08:33)   Cholesterol, Serum: 150 mg/dL   Triglycerides, Serum: 123 mg/dL   HDL Cholesterol, Serum: 41 mg/dL   Non HDL Cholesterol: 109  LDL Cholesterol Calculated: 94 mg/dL
Valproic Acid Level, Serum (03.07.22 @ 10:57) Valproic Acid Level, Serum: 103.0  Hepatic Function Panel in AM (03.07.22 @ 10:57)   Indirect Reacting Bilirubin: >0.0 mg/dL   Protein Total, Serum: 6.9 g/dL   Albumin, Serum: 4.1 g/dL   Bilirubin Total, Serum: 0.2 mg/dL   Bilirubin Direct, Serum: <0.2 mg/dL   Alkaline Phosphatase, Serum: 50 U/L   Aspartate Aminotransferase (AST/SGOT): 14 U/L   Alanine Aminotransferase (ALT/SGPT): 10 U/L   Serum Pro-Brain Natriuretic Peptide (03.04.22 @ 11:24) Serum Pro-Brain Natriuretic Peptide: 98 pg/mL   Complete Blood Count + Automated Diff (03.04.22 @ 08:33)   WBC Count: 8.17 K/uL   RBC Count: 5.10 M/uL   Hemoglobin: 15.2 g/dL   Hematocrit: 45.9 %   Mean Cell Volume: 90.0 fL   Mean Cell Hemoglobin: 29.8 pg   Mean Cell Hemoglobin Conc: 33.1 g/dL   Red Cell Distrib Width: 12.3 %   Platelet Count - Automated: 164 K/uL   Auto Neutrophil #: 4.45 K/uL   Auto Lymphocyte #: 2.31 K/uL   Auto Monocyte #: 1.22 K/uL   Auto Eosinophil #: 0.12 K/uL   Auto Basophil #: 0.03 K/uL   Auto Neutrophil %: 54.4  Auto Lymphocyte %: 28.3 %   Auto Monocyte %: 14.9 %   Auto Eosinophil %: 1.5 %   Auto Basophil %: 0.4 %   Auto Immature Granulocyte %: 0.5: (Includes meta, myelo and promyelocytes) %   Nucleated RBC: 0 /100 WBCs   Valproic Acid Level, Serum (03.03.22 @ 08:11)   Valproic Acid Level, Serum: 47.0 ug/mL   Pregnancy Test Routine, Serum (03.03.22 @ 08:11)   Serum Pregnancy: Negative
Valproic Acid Level, Serum (03.16.22 @ 06:45) Valproic Acid Level, Serum: 72.0 ug/mL   Hepatic Function Panel in AM (03.07.22 @ 10:57)   Indirect Reacting Bilirubin: >0.0 mg/dL   Protein Total, Serum: 6.9 g/dL   Albumin, Serum: 4.1 g/dL   Bilirubin Total, Serum: 0.2 mg/dL   Bilirubin Direct, Serum: <0.2 mg/dL   Alkaline Phosphatase, Serum: 50 U/L   Aspartate Aminotransferase (AST/SGOT): 14 U/L   Alanine Aminotransferase (ALT/SGPT): 10 U/L   Serum Pro-Brain Natriuretic Peptide (03.04.22 @ 11:24) Serum Pro-Brain Natriuretic Peptide: 98 pg/mL   Complete Blood Count + Automated Diff (03.04.22 @ 08:33)   WBC Count: 8.17 K/uL   RBC Count: 5.10 M/uL   Hemoglobin: 15.2 g/dL   Hematocrit: 45.9 %   Mean Cell Volume: 90.0 fL   Mean Cell Hemoglobin: 29.8 pg   Mean Cell Hemoglobin Conc: 33.1 g/dL   Red Cell Distrib Width: 12.3 %   Platelet Count - Automated: 164 K/uL   Auto Neutrophil #: 4.45 K/uL   Auto Lymphocyte #: 2.31 K/uL   Auto Monocyte #: 1.22 K/uL   Auto Eosinophil #: 0.12 K/uL   Auto Basophil #: 0.03 K/uL   Auto Neutrophil %: 54.4  Auto Lymphocyte %: 28.3 %   Auto Monocyte %: 14.9 %   Auto Eosinophil %: 1.5 %   Auto Basophil %: 0.4 %   Auto Immature Granulocyte %: 0.5: (Includes meta, myelo and promyelocytes) %   Nucleated RBC: 0 /100 WBCs   Valproic Acid Level, Serum (03.03.22 @ 08:11)   Valproic Acid Level, Serum: 47.0 ug/mL   Pregnancy Test Routine, Serum (03.03.22 @ 08:11)   Serum Pregnancy: Negative
Hepatic Function Panel in AM (03.07.22 @ 10:57)   Indirect Reacting Bilirubin: >0.0 mg/dL   Protein Total, Serum: 6.9 g/dL   Albumin, Serum: 4.1 g/dL   Bilirubin Total, Serum: 0.2 mg/dL   Bilirubin Direct, Serum: <0.2 mg/dL   Alkaline Phosphatase, Serum: 50 U/L   Aspartate Aminotransferase (AST/SGOT): 14 U/L   Alanine Aminotransferase (ALT/SGPT): 10 U/L   Serum Pro-Brain Natriuretic Peptide (03.04.22 @ 11:24) Serum Pro-Brain Natriuretic Peptide: 98 pg/mL   Complete Blood Count + Automated Diff (03.04.22 @ 08:33)   WBC Count: 8.17 K/uL   RBC Count: 5.10 M/uL   Hemoglobin: 15.2 g/dL   Hematocrit: 45.9 %   Mean Cell Volume: 90.0 fL   Mean Cell Hemoglobin: 29.8 pg   Mean Cell Hemoglobin Conc: 33.1 g/dL   Red Cell Distrib Width: 12.3 %   Platelet Count - Automated: 164 K/uL   Auto Neutrophil #: 4.45 K/uL   Auto Lymphocyte #: 2.31 K/uL   Auto Monocyte #: 1.22 K/uL   Auto Eosinophil #: 0.12 K/uL   Auto Basophil #: 0.03 K/uL   Auto Neutrophil %: 54.4  Auto Lymphocyte %: 28.3 %   Auto Monocyte %: 14.9 %   Auto Eosinophil %: 1.5 %   Auto Basophil %: 0.4 %   Auto Immature Granulocyte %: 0.5: (Includes meta, myelo and promyelocytes) %   Nucleated RBC: 0 /100 WBCs   Valproic Acid Level, Serum (03.03.22 @ 08:11)   Valproic Acid Level, Serum: 47.0 ug/mL   Pregnancy Test Routine, Serum (03.03.22 @ 08:11)   Serum Pregnancy: Negative
Valproic Acid Level, Serum (03.07.22 @ 10:57) Valproic Acid Level, Serum: 103.0  Hepatic Function Panel in AM (03.07.22 @ 10:57)   Indirect Reacting Bilirubin: >0.0 mg/dL   Protein Total, Serum: 6.9 g/dL   Albumin, Serum: 4.1 g/dL   Bilirubin Total, Serum: 0.2 mg/dL   Bilirubin Direct, Serum: <0.2 mg/dL   Alkaline Phosphatase, Serum: 50 U/L   Aspartate Aminotransferase (AST/SGOT): 14 U/L   Alanine Aminotransferase (ALT/SGPT): 10 U/L   Serum Pro-Brain Natriuretic Peptide (03.04.22 @ 11:24) Serum Pro-Brain Natriuretic Peptide: 98 pg/mL   Complete Blood Count + Automated Diff (03.04.22 @ 08:33)   WBC Count: 8.17 K/uL   RBC Count: 5.10 M/uL   Hemoglobin: 15.2 g/dL   Hematocrit: 45.9 %   Mean Cell Volume: 90.0 fL   Mean Cell Hemoglobin: 29.8 pg   Mean Cell Hemoglobin Conc: 33.1 g/dL   Red Cell Distrib Width: 12.3 %   Platelet Count - Automated: 164 K/uL   Auto Neutrophil #: 4.45 K/uL   Auto Lymphocyte #: 2.31 K/uL   Auto Monocyte #: 1.22 K/uL   Auto Eosinophil #: 0.12 K/uL   Auto Basophil #: 0.03 K/uL   Auto Neutrophil %: 54.4  Auto Lymphocyte %: 28.3 %   Auto Monocyte %: 14.9 %   Auto Eosinophil %: 1.5 %   Auto Basophil %: 0.4 %   Auto Immature Granulocyte %: 0.5: (Includes meta, myelo and promyelocytes) %   Nucleated RBC: 0 /100 WBCs   Valproic Acid Level, Serum (03.03.22 @ 08:11)   Valproic Acid Level, Serum: 47.0 ug/mL   Pregnancy Test Routine, Serum (03.03.22 @ 08:11)   Serum Pregnancy: Negative
Valproic Acid Level, Serum (03.07.22 @ 10:57) Valproic Acid Level, Serum: 103.0  Hepatic Function Panel in AM (03.07.22 @ 10:57)   Indirect Reacting Bilirubin: >0.0 mg/dL   Protein Total, Serum: 6.9 g/dL   Albumin, Serum: 4.1 g/dL   Bilirubin Total, Serum: 0.2 mg/dL   Bilirubin Direct, Serum: <0.2 mg/dL   Alkaline Phosphatase, Serum: 50 U/L   Aspartate Aminotransferase (AST/SGOT): 14 U/L   Alanine Aminotransferase (ALT/SGPT): 10 U/L   Serum Pro-Brain Natriuretic Peptide (03.04.22 @ 11:24) Serum Pro-Brain Natriuretic Peptide: 98 pg/mL   Complete Blood Count + Automated Diff (03.04.22 @ 08:33)   WBC Count: 8.17 K/uL   RBC Count: 5.10 M/uL   Hemoglobin: 15.2 g/dL   Hematocrit: 45.9 %   Mean Cell Volume: 90.0 fL   Mean Cell Hemoglobin: 29.8 pg   Mean Cell Hemoglobin Conc: 33.1 g/dL   Red Cell Distrib Width: 12.3 %   Platelet Count - Automated: 164 K/uL   Auto Neutrophil #: 4.45 K/uL   Auto Lymphocyte #: 2.31 K/uL   Auto Monocyte #: 1.22 K/uL   Auto Eosinophil #: 0.12 K/uL   Auto Basophil #: 0.03 K/uL   Auto Neutrophil %: 54.4  Auto Lymphocyte %: 28.3 %   Auto Monocyte %: 14.9 %   Auto Eosinophil %: 1.5 %   Auto Basophil %: 0.4 %   Auto Immature Granulocyte %: 0.5: (Includes meta, myelo and promyelocytes) %   Nucleated RBC: 0 /100 WBCs   Valproic Acid Level, Serum (03.03.22 @ 08:11)   Valproic Acid Level, Serum: 47.0 ug/mL   Pregnancy Test Routine, Serum (03.03.22 @ 08:11)   Serum Pregnancy: Negative

## 2022-03-16 NOTE — PROGRESS NOTE BEHAVIORAL HEALTH - RISK ASSESSMENT
Risk factors include affective dysregulation, disorganization, irritability, h/o medication nonadherence. Protective factors include residential stability, social support, established OP providers, h/o good treatment response.
Risk factors include psychosocial stressors, h/o medication nonadherence, prior alcohol abuse. Protective factors include residential stability, social support, established OP providers, h/o good treatment response, improved insight, motivation to maintain sobriety, cooperative with treatment, willingness to utilize crisis services/ED in times of crisis.
Risk factors include affective dysregulation, disorganization, irritability, h/o medication nonadherence. Protective factors include residential stability, social support, established OP providers, h/o good treatment response.
Risk factors include psychosocial stressors, h/o medication nonadherence, prior alcohol abuse. Protective factors include residential stability, social support, established OP providers, h/o good treatment response, improved insight, motivation to maintain sobriety, cooperative with treatment, willingness to utilize crisis services/ED in times of crisis.
Risk factors include affective dysregulation, disorganization, irritability, h/o medication nonadherence. Protective factors include residential stability, social support, established OP providers, h/o good treatment response.
Risk factors include affective dysregulation, disorganization, irritability, h/o medication nonadherence. Protective factors include residential stability, social support, established OP providers, h/o good treatment response.
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
## 2022-03-16 NOTE — PROGRESS NOTE BEHAVIORAL HEALTH - NSBHFUPINTERVALHXFT_PSY_A_CORE
Pt seen and evaluated, chart reviewed. As per nursing report, no acute events overnight, no PRNs. On evaluation, pt presents pleasant and appropriate, reports she had her AM labs done and is looking forward to joining art [employer] today. She endorses good mood, sleep and appetite, regular BMs. She denies AVH, paranoia. She denies others are stealing her clothes/checks, states she feels safe and looks forward to returning to [facility] to work on returning to her former residence, [facility]. She denies SI/HI, intent and plan. Pt adherent to medications, denies negative side effects. Pt endorses improving LLE edema, denies pain. Pt visible on unit and in groups. Pt in behavioral control. Discharge for tomorrow.

## 2022-03-16 NOTE — PROGRESS NOTE BEHAVIORAL HEALTH - AXIS III
hypothyroidism, prolactinemia, GERD

## 2022-03-16 NOTE — PROGRESS NOTE BEHAVIORAL HEALTH - NSBHADMITIPOBS_PSY_A_CORE
Enhanced supervision

## 2022-03-16 NOTE — PROGRESS NOTE BEHAVIORAL HEALTH - THOUGHT CONTENT
Unremarkable
Preoccupations
Unremarkable
Preoccupations
Unremarkable
Preoccupations

## 2022-03-16 NOTE — DISCHARGE NOTE BEHAVIORAL HEALTH - MEDICATION SUMMARY - MEDICATIONS TO STOP TAKING
I will STOP taking the medications listed below when I get home from the hospital:    OLANZapine 5 mg oral tablet  -- 1 tab(s) by mouth once a day (at bedtime) x 14 days  Continue to take as prescribed until told otherwise by your provider

## 2022-03-16 NOTE — DISCHARGE NOTE BEHAVIORAL HEALTH - PAST PSYCHIATRIC HISTORY
As noted above.  As per chart review, Patient with multiple past IPP admissions per collateral, has been with psychiatrist Dr. Guevara for 10-12 years. Diagnosed with schizoaffective disorder bipolar type. Pt with hx of not compliant with oral medications.

## 2022-03-16 NOTE — DISCHARGE NOTE BEHAVIORAL HEALTH - NSBHDCCONDITIONFT_PSY_A_CORE
Patient is psychiatrically stable for discharge at this time. Patient does not present an imminent risk to self or others at this time

## 2022-03-16 NOTE — DISCHARGE NOTE BEHAVIORAL HEALTH - MEDICATION SUMMARY - MEDICATIONS TO CHANGE
I will SWITCH the dose or number of times a day I take the medications listed below when I get home from the hospital:    Haldol Decanoate  -- 150 milligram(s) intramuscular every 4 weeks  Continue to take as prescribed until told otherwise by your provider  Last dose given: 02/01/2022  Next dose due: 03/01/2022    gabapentin 300 mg oral capsule  -- 1 cap(s) by mouth 2 times a day at 0900, 1700

## 2022-03-17 VITALS
SYSTOLIC BLOOD PRESSURE: 103 MMHG | RESPIRATION RATE: 18 BRPM | HEART RATE: 84 BPM | TEMPERATURE: 97 F | DIASTOLIC BLOOD PRESSURE: 75 MMHG

## 2022-03-17 PROCEDURE — 99238 HOSP IP/OBS DSCHRG MGMT 30/<: CPT

## 2022-03-17 RX ORDER — HYDROXYZINE HCL 10 MG
50 TABLET ORAL ONCE
Refills: 0 | Status: DISCONTINUED | OUTPATIENT
Start: 2022-03-17 | End: 2022-03-17

## 2022-03-17 RX ADMIN — Medication 1 PATCH: at 08:09

## 2022-03-17 RX ADMIN — Medication 400 MILLIGRAM(S): at 08:55

## 2022-03-17 RX ADMIN — GABAPENTIN 400 MILLIGRAM(S): 400 CAPSULE ORAL at 12:14

## 2022-03-17 RX ADMIN — Medication 1 PATCH: at 08:08

## 2022-03-17 RX ADMIN — Medication 2 MILLIGRAM(S): at 12:11

## 2022-03-17 RX ADMIN — Medication 1 PATCH: at 08:04

## 2022-03-17 RX ADMIN — GABAPENTIN 400 MILLIGRAM(S): 400 CAPSULE ORAL at 08:04

## 2022-03-17 RX ADMIN — Medication 2 MILLIGRAM(S): at 08:04

## 2022-03-17 RX ADMIN — Medication 400 MILLIGRAM(S): at 10:59

## 2022-03-17 RX ADMIN — FAMOTIDINE 20 MILLIGRAM(S): 10 INJECTION INTRAVENOUS at 08:04

## 2022-03-17 RX ADMIN — Medication 50 MICROGRAM(S): at 06:20

## 2022-03-17 RX ADMIN — DIVALPROEX SODIUM 1000 MILLIGRAM(S): 500 TABLET, DELAYED RELEASE ORAL at 08:04

## 2022-03-17 RX ADMIN — Medication 2 MILLIGRAM(S): at 08:07

## 2022-03-17 NOTE — CHART NOTE - NSCHARTNOTESELECT_GEN_ALL_CORE
Event Note
attending note/Event Note
Event Note
attending note/Event Note
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
## 2022-03-19 LAB
A VAGINAE DNA VAG QL NAA+PROBE: NORMAL
BVAB2 DNA VAG QL NAA+PROBE: NORMAL
C KRUSEI DNA VAG QL NAA+PROBE: NEGATIVE
C TRACH RRNA SPEC QL NAA+PROBE: NEGATIVE
CYTOLOGY CVX/VAG DOC THIN PREP: ABNORMAL
HPV HIGH+LOW RISK DNA PNL CVX: DETECTED
MEGA1 DNA VAG QL NAA+PROBE: NORMAL
N GONORRHOEA RRNA SPEC QL NAA+PROBE: NEGATIVE
T VAGINALIS RRNA SPEC QL NAA+PROBE: NEGATIVE

## 2022-03-20 DIAGNOSIS — Z79.890 HORMONE REPLACEMENT THERAPY: ICD-10-CM

## 2022-03-20 DIAGNOSIS — F17.210 NICOTINE DEPENDENCE, CIGARETTES, UNCOMPLICATED: ICD-10-CM

## 2022-03-20 DIAGNOSIS — Z28.21 IMMUNIZATION NOT CARRIED OUT BECAUSE OF PATIENT REFUSAL: ICD-10-CM

## 2022-03-20 DIAGNOSIS — F25.9 SCHIZOAFFECTIVE DISORDER, UNSPECIFIED: ICD-10-CM

## 2022-03-20 DIAGNOSIS — F25.0 SCHIZOAFFECTIVE DISORDER, BIPOLAR TYPE: ICD-10-CM

## 2022-03-20 DIAGNOSIS — E03.9 HYPOTHYROIDISM, UNSPECIFIED: ICD-10-CM

## 2022-03-20 DIAGNOSIS — K21.9 GASTRO-ESOPHAGEAL REFLUX DISEASE WITHOUT ESOPHAGITIS: ICD-10-CM

## 2022-03-20 DIAGNOSIS — R60.0 LOCALIZED EDEMA: ICD-10-CM

## 2022-04-08 ENCOUNTER — APPOINTMENT (OUTPATIENT)
Dept: OBGYN | Facility: CLINIC | Age: 37
End: 2022-04-08

## 2022-04-28 ENCOUNTER — APPOINTMENT (OUTPATIENT)
Dept: OBGYN | Facility: CLINIC | Age: 37
End: 2022-04-28

## 2022-05-04 ENCOUNTER — EMERGENCY (EMERGENCY)
Facility: HOSPITAL | Age: 37
LOS: 0 days | Discharge: HOME | End: 2022-05-04
Attending: STUDENT IN AN ORGANIZED HEALTH CARE EDUCATION/TRAINING PROGRAM | Admitting: STUDENT IN AN ORGANIZED HEALTH CARE EDUCATION/TRAINING PROGRAM
Payer: MEDICAID

## 2022-05-04 VITALS
SYSTOLIC BLOOD PRESSURE: 127 MMHG | DIASTOLIC BLOOD PRESSURE: 82 MMHG | HEIGHT: 65 IN | RESPIRATION RATE: 18 BRPM | OXYGEN SATURATION: 99 % | HEART RATE: 86 BPM | TEMPERATURE: 96 F

## 2022-05-04 DIAGNOSIS — S61.412A LACERATION WITHOUT FOREIGN BODY OF LEFT HAND, INITIAL ENCOUNTER: ICD-10-CM
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
## 2022-05-04 DIAGNOSIS — E03.9 HYPOTHYROIDISM, UNSPECIFIED: ICD-10-CM
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
## 2022-05-04 DIAGNOSIS — F31.9 BIPOLAR DISORDER, UNSPECIFIED: ICD-10-CM

## 2022-05-04 DIAGNOSIS — Z23 ENCOUNTER FOR IMMUNIZATION: ICD-10-CM

## 2022-05-04 DIAGNOSIS — F25.9 SCHIZOAFFECTIVE DISORDER, UNSPECIFIED: ICD-10-CM

## 2022-05-04 DIAGNOSIS — F17.200 NICOTINE DEPENDENCE, UNSPECIFIED, UNCOMPLICATED: ICD-10-CM

## 2022-05-04 PROCEDURE — 99283 EMERGENCY DEPT VISIT LOW MDM: CPT

## 2022-05-04 RX ORDER — TETANUS TOXOID, REDUCED DIPHTHERIA TOXOID AND ACELLULAR PERTUSSIS VACCINE, ADSORBED 5; 2.5; 8; 8; 2.5 [IU]/.5ML; [IU]/.5ML; UG/.5ML; UG/.5ML; UG/.5ML
0.5 SUSPENSION INTRAMUSCULAR ONCE
Refills: 0 | Status: COMPLETED | OUTPATIENT
Start: 2022-05-04 | End: 2022-05-04

## 2022-05-04 RX ADMIN — TETANUS TOXOID, REDUCED DIPHTHERIA TOXOID AND ACELLULAR PERTUSSIS VACCINE, ADSORBED 0.5 MILLILITER(S): 5; 2.5; 8; 8; 2.5 SUSPENSION INTRAMUSCULAR at 08:32
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
## 2022-05-04 NOTE — ED PROVIDER NOTE - PATIENT PORTAL LINK FT
You can access the FollowMyHealth Patient Portal offered by [facility] by registering at the following website: [email or web address]. By joining "[employer]’s FollowMyHealth portal, you will also be able to view your health information using other applications (apps) compatible with our system.

## 2022-05-04 NOTE — ED PROVIDER NOTE - IV ALTEPLASE EXCL ABS HIDDEN
show [General Appearance - Well Developed] : well developed [Normal Appearance] : normal appearance [Well Groomed] : well groomed [General Appearance - Well Nourished] : well nourished [General Appearance - In No Acute Distress] : no acute distress [No Deformities] : no deformities [Eyelids - No Xanthelasma] : the eyelids demonstrated no xanthelasmas [Normal Conjunctiva] : the conjunctiva exhibited no abnormalities [No Oral Cyanosis] : no oral cyanosis [Normal Oral Mucosa] : normal oral mucosa [No Oral Pallor] : no oral pallor [Respiration, Rhythm And Depth] : normal respiratory rhythm and effort [Auscultation Breath Sounds / Voice Sounds] : lungs were clear to auscultation bilaterally [Exaggerated Use Of Accessory Muscles For Inspiration] : no accessory muscle use [Heart Sounds] : normal S1 and S2 [Heart Rate And Rhythm] : heart rate and rhythm were normal [Edema] : no peripheral edema present [Arterial Pulses Normal] : the arterial pulses were normal [Systolic grade ___/6] : A grade [unfilled]/6 systolic murmur was heard. [Abdomen Soft] : soft [Abdomen Tenderness] : non-tender [Abdomen Mass (___ Cm)] : no abdominal mass palpated [] : no hepato-splenomegaly [Abnormal Walk] : normal gait [Gait - Sufficient For Exercise Testing] : the gait was sufficient for exercise testing [Oriented To Time, Place, And Person] : oriented to person, place, and time [Affect] : the affect was normal [Impaired Insight] : insight and judgment were intact [No Anxiety] : not feeling anxious [Mood] : the mood was normal [FreeTextEntry1] : no JVD or bruits
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
## 2022-05-04 NOTE — ED PROVIDER NOTE - NSFOLLOWUPCLINICS_GEN_ALL_ED_FT
[facility]  Medicine  [address], [place]   Phone: [phone]  Fax:   Follow Up Time: Routine

## 2022-05-04 NOTE — ED PROVIDER NOTE - NS ED ROS FT
REVIEW OF SYSTEMS:    CONSTITUTIONAL: No weakness, fevers or chills  EYES/ENT: No visual changes;  No vertigo or throat pain   NECK: No pain or stiffness  RESPIRATORY: No cough, wheezing, hemoptysis; No shortness of breath  CARDIOVASCULAR: No chest pain or palpitations  GASTROINTESTINAL: No abdominal or epigastric pain. No nausea, vomiting, or hematemesis; No diarrhea or constipation. No melena or hematochezia.  GENITOURINARY: No dysuria, frequency or hematuria  NEUROLOGICAL: No numbness or weakness  SKIN: +cut on hand; No itching, burning, rashes   All other review of systems is negative unless indicated above.

## 2022-05-04 NOTE — ED PROVIDER NOTE - PHYSICAL EXAMINATION
LOS:     VITALS:   T(C): 35.6 (05-04-22 @ 07:24), Max: 35.6 (05-04-22 @ 07:24)  HR: 86 (05-04-22 @ 07:24) (86 - 86)  BP: 127/82 (05-04-22 @ 07:24) (127/82 - 127/82)  RR: 18 (05-04-22 @ 07:24) (18 - 18)  SpO2: 99% (05-04-22 @ 07:24) (99% - 99%)    GENERAL: NAD  HEAD:  Atraumatic, Normocephalic  EYES: EOMI, PERRLA, conjunctiva and sclera clear  ENT: Moist mucous membranes  NECK: Supple, No JVD  CHEST/LUNG: Clear to auscultation bilaterally; No rales, rhonchi, wheezing, or rubs. Unlabored respirations  HEART: Regular rate and rhythm; No murmurs, rubs, or gallops  ABDOMEN: BSx4; Soft, nontender, nondistended  EXTREMITIES: +2 cm laceration on L palmar surface; no foreign body visualized; 2+ Peripheral Pulses, brisk capillary refill. No clubbing, cyanosis, or edema  NERVOUS SYSTEM:  distractable, disorganized at baseline; A&Ox3, no focal deficits; ROM and sensation intact   SKIN: No rashes or other lesions LOS:     VITALS:   T(C): 35.6 (05-04-22 @ 07:24), Max: 35.6 (05-04-22 @ 07:24)  HR: 86 (05-04-22 @ 07:24) (86 - 86)  BP: 127/82 (05-04-22 @ 07:24) (127/82 - 127/82)  RR: 18 (05-04-22 @ 07:24) (18 - 18)  SpO2: 99% (05-04-22 @ 07:24) (99% - 99%)    GENERAL: NAD  HEAD:  Atraumatic, Normocephalic  EYES: EOMI, PERRLA, conjunctiva and sclera clear  ENT: Moist mucous membranes  NECK: Supple, No JVD  CHEST/LUNG: Clear to auscultation bilaterally; No rales, rhonchi, wheezing, or rubs. Unlabored respirations  HEART: Regular rate and rhythm; No murmurs, rubs, or gallops  ABDOMEN: BSx4; Soft, nontender, nondistended  EXTREMITIES: +2 cm laceration on L palmar surface; no foreign body visualized; 2+ Peripheral Pulses, brisk capillary refill. No clubbing, cyanosis, or edema  NERVOUS SYSTEM:  distractable; A&Ox3, no focal deficits; ROM and sensation intact   SKIN: No rashes or other lesions

## 2022-05-13 ENCOUNTER — APPOINTMENT (OUTPATIENT)
Dept: OBGYN | Facility: CLINIC | Age: 37
End: 2022-05-13
Payer: MEDICAID

## 2022-05-13 ENCOUNTER — OUTPATIENT (OUTPATIENT)
Dept: OUTPATIENT SERVICES | Facility: HOSPITAL | Age: 37
LOS: 1 days | Discharge: HOME | End: 2022-05-13

## 2022-05-13 VITALS — WEIGHT: 205 LBS | DIASTOLIC BLOOD PRESSURE: 70 MMHG | SYSTOLIC BLOOD PRESSURE: 110 MMHG | BODY MASS INDEX: 33.09 KG/M2

## 2022-05-13 PROBLEM — E03.9 HYPOTHYROIDISM, UNSPECIFIED: Chronic | Status: ACTIVE | Noted: 2022-05-04

## 2022-05-13 PROCEDURE — 99213 OFFICE O/P EST LOW 20 MIN: CPT

## 2022-05-13 RX ADMIN — MEDROXYPROGESTERONE ACETATE 0 MG/ML: 150 INJECTION, SUSPENSION INTRAMUSCULAR at 00:00

## 2022-05-13 NOTE — PLAN
[FreeTextEntry1] : 35yo G0 with h/o ASCUS and HPV pos pap, here for DMPA.\par \par - DMPA given in left deltoid without difficulty (lot #[id number], exp 11/30/26)\par - patient has missed colpo appointment x2, explained importance of further testing for abnormal pap smear noted in february; patient voiced understanding and stated she will follow up at next colpo visit\par - counseled on safe sex practices\par - RTC in 3 months for DMPA and annual

## 2022-05-13 NOTE — HISTORY OF PRESENT ILLNESS
[FreeTextEntry1] : 35yo G0 with LMP 5/1/22 here for DMPA injection.  Reports irregular menses secondary to DMPA.  Last DMPA was 2/25.  Happy with this form of birth control and desires to continue. Denies any other complaints.

## 2022-06-03 ENCOUNTER — APPOINTMENT (OUTPATIENT)
Dept: OBGYN | Facility: CLINIC | Age: 37
End: 2022-06-03

## 2022-06-17 NOTE — PROGRESS NOTE BEHAVIORAL HEALTH - THOUGHT CONTENT
Delusions
Delusions
Preoccupations
Unremarkable
Preoccupations
Delusions
SSKI Counseling:  I discussed with the patient the risks of SSKI including but not limited to thyroid abnormalities, metallic taste, GI upset, fever, headache, acne, arthralgias, paraesthesias, lymphadenopathy, easy bleeding, arrhythmias, and allergic reaction.

## 2022-06-23 ENCOUNTER — APPOINTMENT (OUTPATIENT)
Dept: OBGYN | Facility: CLINIC | Age: 37
End: 2022-06-23

## 2022-07-07 ENCOUNTER — OUTPATIENT (OUTPATIENT)
Dept: OUTPATIENT SERVICES | Facility: HOSPITAL | Age: 37
LOS: 1 days | Discharge: HOME | End: 2022-07-07

## 2022-07-07 ENCOUNTER — APPOINTMENT (OUTPATIENT)
Dept: OBGYN | Facility: CLINIC | Age: 37
End: 2022-07-07

## 2022-07-07 ENCOUNTER — RESULT CHARGE (OUTPATIENT)
Age: 37
End: 2022-07-07

## 2022-07-07 VITALS
BODY MASS INDEX: 33.75 KG/M2 | HEIGHT: 66 IN | DIASTOLIC BLOOD PRESSURE: 70 MMHG | SYSTOLIC BLOOD PRESSURE: 110 MMHG | WEIGHT: 210 LBS

## 2022-07-07 PROCEDURE — 99213 OFFICE O/P EST LOW 20 MIN: CPT | Mod: 25

## 2022-07-07 PROCEDURE — 57454 BX/CURETT OF CERVIX W/SCOPE: CPT

## 2022-07-07 NOTE — HISTORY OF PRESENT ILLNESS
[FreeTextEntry1] : 35yo with ASCUS and HPV 18/45 positive here for colposcopy without complaints\par Never had abnormal pap or colpo before, reviewed importance of colposcopy and reason and natural course of HPV

## 2022-07-07 NOTE — PROCEDURE
[Colposcopy] : Colposcopy  [Risks] : risks [Benefits] : benefits [Alternatives] : alternatives [ASCUS] : ASCUS [HPV High Risk] : HPV high risk [No Premedication] : no premedication [Colposcopy Adequate] : colposcopy adequate [SCI Fully Visualized] : SCI fully visualized [ECC Performed] : ECC performed [Biopsy] : biopsy taken [Hemostasis Obtained] : Hemostasis obtained [Tolerated Well] : the patient tolerated the procedure well [de-identified] : 4 [de-identified] : mosaicism at 1 oclock, acetowhite at 3 and 6 [de-identified] : ECC \par 1,3,6 oclock [de-identified] : punch biopsy [de-identified] : lydia [de-identified] : adequate colpo, low grade lesions

## 2022-07-07 NOTE — PHYSICAL EXAM
[Chaperone Present] : A chaperone was present in the examining room during all aspects of the physical examination [Appropriately responsive] : appropriately responsive [Alert] : alert [No Acute Distress] : no acute distress [Oriented x3] : oriented x3 [Labia Majora] : normal [Normal] : normal

## 2022-07-08 LAB — HCG UR QL: NEGATIVE

## 2022-08-05 ENCOUNTER — OUTPATIENT (OUTPATIENT)
Dept: OUTPATIENT SERVICES | Facility: HOSPITAL | Age: 37
LOS: 1 days | Discharge: HOME | End: 2022-08-05

## 2022-08-05 ENCOUNTER — APPOINTMENT (OUTPATIENT)
Dept: OBGYN | Facility: CLINIC | Age: 37
End: 2022-08-05

## 2022-08-05 VITALS
HEIGHT: 66 IN | DIASTOLIC BLOOD PRESSURE: 77 MMHG | WEIGHT: 220.44 LBS | BODY MASS INDEX: 35.43 KG/M2 | SYSTOLIC BLOOD PRESSURE: 120 MMHG

## 2022-08-05 PROCEDURE — 99395 PREV VISIT EST AGE 18-39: CPT

## 2022-08-05 NOTE — DISCUSSION/SUMMARY
[FreeTextEntry1] : 37yo s/p colpo with high grade intraepithelial lesion/CIN2 (pap ASCUS, HPV 16/18 pos), here for annual. \par \par #high grade intraepithelial lesion/CIN2 \par - Discussed risk/benefits/alternatives of LEEP. Patient amenable to procedure\par - Will schedule for LEEP\par \par #Depo\par - RTC in 3 mo for next dose\par \par #annual\par - RTC in 1 year for annual \par - Declined testing

## 2022-08-05 NOTE — HISTORY OF PRESENT ILLNESS
[FreeTextEntry1] : Halima  #[id number]\par \par 37yo s/p colposcopy for pap findings of ASCUS and HPV 18/45 positive. Pathology showed focal high grade squamous intraepithelial lesion (MAURICE 2) in the background of low-grade squamous intraepithelial lesion (CIN1) from 6 o'clock biopsy. \par \par Patient also here for annual and Depo shot. Currently denies abdominal pain. LMP unknown. Currently sexually active with all condom use, sometimes. Denies abnormal vaginal discharge. Declines STD testing. \par \par

## 2022-08-06 LAB — CORE LAB BIOPSY: NORMAL

## 2022-08-26 NOTE — ED ADULT TRIAGE NOTE - ACCOMPANIED BY
The groin was prepped. The site was prepped with ChloraPrep. The site was clipped. The patient was draped. The patient was positioned supine.  Spouse/Significant other

## 2022-09-07 ENCOUNTER — APPOINTMENT (OUTPATIENT)
Dept: OBGYN | Facility: CLINIC | Age: 37
End: 2022-09-07

## 2022-09-07 ENCOUNTER — OUTPATIENT (OUTPATIENT)
Dept: OUTPATIENT SERVICES | Facility: HOSPITAL | Age: 37
LOS: 1 days | Discharge: HOME | End: 2022-09-07

## 2022-09-07 VITALS — DIASTOLIC BLOOD PRESSURE: 86 MMHG | SYSTOLIC BLOOD PRESSURE: 132 MMHG | BODY MASS INDEX: 36.32 KG/M2 | WEIGHT: 225 LBS

## 2022-09-07 DIAGNOSIS — R87.610 ATYPICAL SQUAMOUS CELLS OF UNDETERMINED SIGNIFICANCE ON CYTOLOGIC SMEAR OF CERVIX (ASC-US): ICD-10-CM

## 2022-09-07 PROCEDURE — 99213 OFFICE O/P EST LOW 20 MIN: CPT | Mod: 25

## 2022-09-07 PROCEDURE — 57522 CONIZATION OF CERVIX: CPT

## 2022-09-07 NOTE — HISTORY OF PRESENT ILLNESS
[FreeTextEntry1] : 36yo here for LEEP\par \par Pap history: \par Aug 2022: CIN1/2, ECC neg\par Feb 2022: ASCUS HPV 18/45\par Mar 2019: PAP/HPV neg\par Dec 2016: PAP/HPV neg\par \par

## 2022-09-07 NOTE — PHYSICAL EXAM
[Chaperone Present] : A chaperone was present in the examining room during all aspects of the physical examination [Appropriately responsive] : appropriately responsive [Alert] : alert [No Acute Distress] : no acute distress [Labia Majora] : normal [Normal] : normal

## 2022-09-07 NOTE — DISCUSSION/SUMMARY
[FreeTextEntry1] : 38yo s/p leep and ECC\par -f/u path, will call with result\par -has apt in 1 month for depo provera

## 2022-09-07 NOTE — PROCEDURE
[Colposcopy with Loop Electrode Excision of the Cervix] : Colposcopy with loop electrode excision of the cervix [Time out performed] : Pre-procedure time out performed.  Patient's name, date of birth and procedure confirmed. [Consent Obtained] : Consent obtained [Risks] : risks [Benefits] : benefits [Alternatives] : alternatives [Negative] : negative pregnancy test [No Premedication] : no premedication [____mg Lidocaine w/Epi] : ~Vmg  lidocaine with epinephrine [SCJ Fully Visualized] : SCJ fully visualized [Cutting Setting ___watts] : cutting setting [unfilled] simms [ECC Done] : ECC done [Hemostasis Obtained] : Hemostasis obtained [Sent to Pathology] : the specimen was placed in buffered formalin and sent for pathology [Tolerated Well] : the patient tolerated the procedure well [de-identified] : MAURICE I/II on colpo, 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 Loop electrode was used to obtain cone biopsy.  ECC was then performed. Hemostasis obtained with cautery. Patient tolerated well [de-identified] : with cautery

## 2022-09-30 LAB — CORE LAB BIOPSY: NORMAL

## 2022-10-21 ENCOUNTER — RESULT CHARGE (OUTPATIENT)
Age: 37
End: 2022-10-21

## 2022-10-21 ENCOUNTER — OUTPATIENT (OUTPATIENT)
Dept: OUTPATIENT SERVICES | Facility: HOSPITAL | Age: 37
LOS: 1 days | Discharge: HOME | End: 2022-10-21

## 2022-10-21 ENCOUNTER — APPOINTMENT (OUTPATIENT)
Dept: OBGYN | Facility: CLINIC | Age: 37
End: 2022-10-21

## 2022-10-21 VITALS — BODY MASS INDEX: 36.32 KG/M2 | DIASTOLIC BLOOD PRESSURE: 70 MMHG | SYSTOLIC BLOOD PRESSURE: 110 MMHG | WEIGHT: 225 LBS

## 2022-10-21 PROCEDURE — 99213 OFFICE O/P EST LOW 20 MIN: CPT | Mod: 24

## 2022-10-21 NOTE — PLAN
[FreeTextEntry1] : 38yo s/p LEEP 9/2022, s/p depo shot.\par \par #health maintenance\par -depo given today\par -Due for annual 8/2023\par \par #postcoital bleeding\par -no visible lesions or friable cervix noted\par -f/u vaginitis\par \par f/u in 3 months for depo provera\par discussed results of LEEP with patient, no evidence of high grade dysplasia, will need repeat colpo and pap in march-april 2023

## 2022-10-21 NOTE — PHYSICAL EXAM
[Appropriately responsive] : appropriately responsive [Alert] : alert [No Acute Distress] : no acute distress [No Lymphadenopathy] : no lymphadenopathy [Regular Rate Rhythm] : regular rate rhythm [No Murmurs] : no murmurs [Clear to Auscultation B/L] : clear to auscultation bilaterally [Soft] : soft [Non-tender] : non-tender [Non-distended] : non-distended [No HSM] : No HSM [No Lesions] : no lesions [No Mass] : no mass [Oriented x3] : oriented x3 [Labia Majora] : normal [Labia Minora] : normal [Normal] : normal [Uterine Adnexae] : normal [FreeTextEntry4] : minimal brown discharge noted

## 2022-10-21 NOTE — HISTORY OF PRESENT ILLNESS
[FreeTextEntry1] : 38yo s/p HAWA 9/22 presents today for next depo shot. Patient states she is happy with method. She also endorses postcoidal bleeding for the past month. Denies vaginal discharge or itching. Denies fevers or chills.

## 2022-10-27 LAB
A VAGINAE DNA VAG QL NAA+PROBE: NORMAL
BVAB2 DNA VAG QL NAA+PROBE: NORMAL
C KRUSEI DNA VAG QL NAA+PROBE: NEGATIVE
C TRACH RRNA SPEC QL NAA+PROBE: NEGATIVE
HCG UR QL: NEGATIVE
MEGA1 DNA VAG QL NAA+PROBE: NORMAL
N GONORRHOEA RRNA SPEC QL NAA+PROBE: NEGATIVE
QUALITY CONTROL: YES
T VAGINALIS RRNA SPEC QL NAA+PROBE: NEGATIVE

## 2023-01-06 ENCOUNTER — APPOINTMENT (OUTPATIENT)
Dept: OBGYN | Facility: CLINIC | Age: 38
End: 2023-01-06
Payer: COMMERCIAL

## 2023-01-06 ENCOUNTER — OUTPATIENT (OUTPATIENT)
Dept: OUTPATIENT SERVICES | Facility: HOSPITAL | Age: 38
LOS: 1 days | Discharge: HOME | End: 2023-01-06

## 2023-01-06 VITALS
SYSTOLIC BLOOD PRESSURE: 110 MMHG | DIASTOLIC BLOOD PRESSURE: 70 MMHG | WEIGHT: 243 LBS | BODY MASS INDEX: 39.05 KG/M2 | HEIGHT: 66 IN

## 2023-01-06 PROCEDURE — 99213 OFFICE O/P EST LOW 20 MIN: CPT

## 2023-01-06 NOTE — HISTORY OF PRESENT ILLNESS
[FreeTextEntry1] : 36yo presenting for depo shot. Does not remember LMP. Reports postcoital bleeding has resolved, vaginitis was negative. No complaints, no changes in her health.\par

## 2023-01-06 NOTE — PLAN
[FreeTextEntry1] : 36yo s/p depo provera\par \par #depo\par -[id number], EXP8-2024 admin to right deltoid\par -RTC Mar 24-Apr 7 for depo\par \par #s/p LEEP\par -repeat pap in Mar/Apr 2023 (at next visit)\par \par annual due 8/2023

## 2023-02-16 ENCOUNTER — OUTPATIENT (OUTPATIENT)
Dept: OUTPATIENT SERVICES | Facility: HOSPITAL | Age: 38
LOS: 1 days | End: 2023-02-16
Payer: MEDICAID

## 2023-02-16 DIAGNOSIS — K02.9 DENTAL CARIES, UNSPECIFIED: ICD-10-CM

## 2023-02-16 PROCEDURE — D7140: CPT

## 2023-02-16 PROCEDURE — D0220: CPT

## 2023-02-16 PROCEDURE — D0140: CPT

## 2023-02-22 DIAGNOSIS — K02.63 DENTAL CARIES ON SMOOTH SURFACE PENETRATING INTO PULP: ICD-10-CM

## 2023-03-24 ENCOUNTER — APPOINTMENT (OUTPATIENT)
Dept: OBGYN | Facility: CLINIC | Age: 38
End: 2023-03-24
Payer: COMMERCIAL

## 2023-03-24 ENCOUNTER — OUTPATIENT (OUTPATIENT)
Dept: OUTPATIENT SERVICES | Facility: HOSPITAL | Age: 38
LOS: 1 days | End: 2023-03-24
Payer: COMMERCIAL

## 2023-03-24 ENCOUNTER — APPOINTMENT (OUTPATIENT)
Dept: OBGYN | Facility: CLINIC | Age: 38
End: 2023-03-24

## 2023-03-24 VITALS
WEIGHT: 247 LBS | HEIGHT: 66 IN | SYSTOLIC BLOOD PRESSURE: 112 MMHG | BODY MASS INDEX: 39.7 KG/M2 | DIASTOLIC BLOOD PRESSURE: 60 MMHG

## 2023-03-24 DIAGNOSIS — Z00.00 ENCOUNTER FOR GENERAL ADULT MEDICAL EXAMINATION W/OUT ABNORMAL FINDINGS: ICD-10-CM

## 2023-03-24 DIAGNOSIS — Z30.42 ENCOUNTER FOR SURVEILLANCE OF INJECTABLE CONTRACEPTIVE: ICD-10-CM

## 2023-03-24 PROCEDURE — 99213 OFFICE O/P EST LOW 20 MIN: CPT

## 2023-03-24 PROCEDURE — 87661 TRICHOMONAS VAGINALIS AMPLIF: CPT

## 2023-03-24 PROCEDURE — 87491 CHLMYD TRACH DNA AMP PROBE: CPT

## 2023-03-24 PROCEDURE — 96372 THER/PROPH/DIAG INJ SC/IM: CPT

## 2023-03-24 PROCEDURE — 88142 CYTOPATH C/V THIN LAYER: CPT

## 2023-03-24 PROCEDURE — 87624 HPV HI-RISK TYP POOLED RSLT: CPT

## 2023-03-24 PROCEDURE — 87591 N.GONORRHOEAE DNA AMP PROB: CPT

## 2023-03-24 NOTE — HISTORY OF PRESENT ILLNESS
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

## 2023-03-24 NOTE — DISCUSSION/SUMMARY
[FreeTextEntry1] : 36yo for depo provera and repeat pap for h/o MAURICE 2 and LEEP\par -pap/hpv\par -depo given in R deltoid [id number] EXP 11/2024\par -f/u in 3 mo. for next depo.\par -GC screening

## 2023-03-27 DIAGNOSIS — Z30.42 ENCOUNTER FOR SURVEILLANCE OF INJECTABLE CONTRACEPTIVE: ICD-10-CM

## 2023-03-27 DIAGNOSIS — Z00.00 ENCOUNTER FOR GENERAL ADULT MEDICAL EXAMINATION WITHOUT ABNORMAL FINDINGS: ICD-10-CM

## 2023-03-27 LAB
C TRACH RRNA SPEC QL NAA+PROBE: NOT DETECTED
N GONORRHOEA RRNA SPEC QL NAA+PROBE: NOT DETECTED
SOURCE AMPLIFICATION: NORMAL
SOURCE AMPLIFICATION: NORMAL
T VAGINALIS RRNA SPEC QL NAA+PROBE: NOT DETECTED

## 2023-03-31 LAB — HPV HIGH+LOW RISK DNA PNL CVX: NOT DETECTED

## 2023-04-01 LAB — CYTOLOGY CVX/VAG DOC THIN PREP: NORMAL

## 2023-06-09 ENCOUNTER — APPOINTMENT (OUTPATIENT)
Dept: OBGYN | Facility: CLINIC | Age: 38
End: 2023-06-09
Payer: MEDICAID

## 2023-06-09 ENCOUNTER — OUTPATIENT (OUTPATIENT)
Dept: OUTPATIENT SERVICES | Facility: HOSPITAL | Age: 38
LOS: 1 days | End: 2023-06-09
Payer: MEDICAID

## 2023-06-09 VITALS
DIASTOLIC BLOOD PRESSURE: 72 MMHG | SYSTOLIC BLOOD PRESSURE: 124 MMHG | HEIGHT: 66 IN | WEIGHT: 250 LBS | BODY MASS INDEX: 40.18 KG/M2

## 2023-06-09 DIAGNOSIS — R87.810 ATYPICAL SQUAMOUS CELLS OF UNDETERMINED SIGNIFICANCE ON CYTOLOGIC SMEAR OF CERVIX (ASC-US): ICD-10-CM

## 2023-06-09 DIAGNOSIS — R87.610 ATYPICAL SQUAMOUS CELLS OF UNDETERMINED SIGNIFICANCE ON CYTOLOGIC SMEAR OF CERVIX (ASC-US): ICD-10-CM

## 2023-06-09 DIAGNOSIS — Z30.42 ENCOUNTER FOR SURVEILLANCE OF INJECTABLE CONTRACEPTIVE: ICD-10-CM

## 2023-06-09 PROCEDURE — 99212 OFFICE O/P EST SF 10 MIN: CPT

## 2023-06-09 PROCEDURE — 96372 THER/PROPH/DIAG INJ SC/IM: CPT | Mod: XU

## 2023-06-12 DIAGNOSIS — R87.610 ATYPICAL SQUAMOUS CELLS OF UNDETERMINED SIGNIFICANCE ON CYTOLOGIC SMEAR OF CERVIX (ASC-US): ICD-10-CM

## 2023-06-12 DIAGNOSIS — Z30.42 ENCOUNTER FOR SURVEILLANCE OF INJECTABLE CONTRACEPTIVE: ICD-10-CM

## 2023-06-12 NOTE — HISTORY OF PRESENT ILLNESS
[FreeTextEntry1] : 36yo presenting for depo provera, happy with this form of contraception, no changes in her health since last visit. Sees PCP, will be seeing them in July for care and COVID booster shot. 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 Repeat co-testing in March/2023 NILM/neg. \par

## 2023-06-12 NOTE — PLAN
[FreeTextEntry1] : 36yo presenting for maintenance of depo provera,\par \par #depo\par -given in R deltoid, LOT#[id number], EXP 4/26\par \par #MAURICE II\par -negative repeat cotesting, will proceed with annual screening for 3 years per algorithm\par \par RTC 3 mos for annual visit and depo

## 2023-06-30 NOTE — PROGRESS NOTE BEHAVIORAL HEALTH - SECONDARY DX1
----- Message from RAGHU Cabrera sent at 6/30/2023  8:14 AM CDT -----  Please send script for Amox 500 BID for 7 days if she would like to start before the culture is final. Will have results by Monday.  Thanks,  Lenore            
Nicotine dependence

## 2023-08-25 ENCOUNTER — OUTPATIENT (OUTPATIENT)
Dept: OUTPATIENT SERVICES | Facility: HOSPITAL | Age: 38
LOS: 1 days | End: 2023-08-25
Payer: COMMERCIAL

## 2023-08-25 ENCOUNTER — APPOINTMENT (OUTPATIENT)
Dept: OBGYN | Facility: CLINIC | Age: 38
End: 2023-08-25
Payer: COMMERCIAL

## 2023-08-25 VITALS — DIASTOLIC BLOOD PRESSURE: 78 MMHG | SYSTOLIC BLOOD PRESSURE: 120 MMHG | BODY MASS INDEX: 25.82 KG/M2 | WEIGHT: 160 LBS

## 2023-08-25 DIAGNOSIS — Z30.42 ENCOUNTER FOR SURVEILLANCE OF INJECTABLE CONTRACEPTIVE: ICD-10-CM

## 2023-08-25 PROCEDURE — 96372 THER/PROPH/DIAG INJ SC/IM: CPT

## 2023-08-25 PROCEDURE — 99212 OFFICE O/P EST SF 10 MIN: CPT

## 2023-08-25 RX ORDER — MEDROXYPROGESTERONE ACETATE 150 MG/ML
150 INJECTION, SUSPENSION INTRAMUSCULAR
Qty: 0 | Refills: 0 | Status: COMPLETED | OUTPATIENT
Start: 2023-08-25

## 2023-08-25 RX ADMIN — MEDROXYPROGESTERONE ACETATE 0 MG/ML: 150 INJECTION, SUSPENSION INTRAMUSCULAR at 00:00

## 2023-08-30 DIAGNOSIS — Z30.42 ENCOUNTER FOR SURVEILLANCE OF INJECTABLE CONTRACEPTIVE: ICD-10-CM

## 2023-10-16 ENCOUNTER — OUTPATIENT (OUTPATIENT)
Dept: OUTPATIENT SERVICES | Facility: HOSPITAL | Age: 38
LOS: 1 days | End: 2023-10-16
Payer: MEDICAID

## 2023-10-16 DIAGNOSIS — K02.9 DENTAL CARIES, UNSPECIFIED: ICD-10-CM

## 2023-10-16 PROCEDURE — D0220: CPT

## 2023-10-16 PROCEDURE — D0140: CPT

## 2023-10-26 DIAGNOSIS — K04.7 PERIAPICAL ABSCESS WITHOUT SINUS: ICD-10-CM

## 2023-10-27 NOTE — HISTORY OF PRESENT ILLNESS
Patient calling back, looking to move them. Please call back, or ok to send message in portal. Available until 1 or after 2:30, ok to leave detailed voicemail. [FreeTextEntry1] : 34yo P0 with LMP 1.5 years ago presents to clinic for DMPA injection. Reports she is happy with this form on contraception. Denies any abnormal vaginal bleeding, vaginal discharge, or vaginal odor.\par Last pap: March 2019 [facility]

## 2023-11-10 ENCOUNTER — APPOINTMENT (OUTPATIENT)
Dept: OBGYN | Facility: CLINIC | Age: 38
End: 2023-11-10

## 2023-12-07 ENCOUNTER — APPOINTMENT (OUTPATIENT)
Dept: OBGYN | Facility: CLINIC | Age: 38
End: 2023-12-07

## 2023-12-20 NOTE — ED PROVIDER NOTE - NSFOLLOWUPINSTRUCTIONS_ED_ALL_ED_FT
No care due was identified.  [facility] Embedded Care Due Messages. Reference number: [id number].   12/20/2023 11:16:48 AM CST  
Cough    Coughing is a reflex that clears your throat and your airways. Coughing helps to heal and protect your lungs. It is normal to cough occasionally, but a cough that happens with other symptoms or lasts a long time may be a sign of a condition that needs treatment. Coughing may be caused by infections, asthma or COPD, smoking, postnasal drip, gastroesophageal reflux, as well as other medical conditions. Take medicines only as instructed by your health care provider. Avoid environments or triggers that causes you to cough at work or at home.    SEEK IMMEDIATE MEDICAL CARE IF YOU HAVE ANY OF THE FOLLOWING SYMPTOMS: coughing up blood, shortness of breath, rapid heart rate, chest pain, unexplained weight loss or night sweats.

## 2023-12-21 ENCOUNTER — APPOINTMENT (OUTPATIENT)
Dept: OBGYN | Facility: CLINIC | Age: 38
End: 2023-12-21
Payer: COMMERCIAL

## 2023-12-21 ENCOUNTER — OUTPATIENT (OUTPATIENT)
Dept: OUTPATIENT SERVICES | Facility: HOSPITAL | Age: 38
LOS: 1 days | End: 2023-12-21
Payer: COMMERCIAL

## 2023-12-21 ENCOUNTER — APPOINTMENT (OUTPATIENT)
Dept: OBGYN | Facility: CLINIC | Age: 38
End: 2023-12-21

## 2023-12-21 VITALS
OXYGEN SATURATION: 100 % | DIASTOLIC BLOOD PRESSURE: 80 MMHG | BODY MASS INDEX: 25.71 KG/M2 | HEIGHT: 66 IN | TEMPERATURE: 98 F | RESPIRATION RATE: 18 BRPM | HEART RATE: 80 BPM | WEIGHT: 160 LBS | SYSTOLIC BLOOD PRESSURE: 120 MMHG

## 2023-12-21 DIAGNOSIS — Z30.42 ENCOUNTER FOR SURVEILLANCE OF INJECTABLE CONTRACEPTIVE: ICD-10-CM

## 2023-12-21 LAB
HCG UR QL: NEGATIVE
QUALITY CONTROL: YES

## 2023-12-21 PROCEDURE — ZZZZZ: CPT

## 2023-12-21 PROCEDURE — 96372 THER/PROPH/DIAG INJ SC/IM: CPT

## 2023-12-21 NOTE — HISTORY OF PRESENT ILLNESS
[FreeTextEntry1] : Patient presenting today to restart DMPA. She was satisifed with the method, is late from last injection. She desires to restart today denies recent unprotected intercourse.

## 2023-12-21 NOTE — PLAN
[FreeTextEntry1] : 39yo desiring to restart DMPA.   Administered today by RN   RTC in 12w or sooner as needed [ ]  UPreg next visit due to restarting mid cycle

## 2023-12-26 DIAGNOSIS — Z00.00 ENCOUNTER FOR GENERAL ADULT MEDICAL EXAMINATION WITHOUT ABNORMAL FINDINGS: ICD-10-CM

## 2024-01-04 ENCOUNTER — EMERGENCY (EMERGENCY)
Facility: HOSPITAL | Age: 39
LOS: 0 days | Discharge: HOME | End: 2024-01-04
Attending: EMERGENCY MEDICINE
Payer: MEDICAID

## 2024-01-04 VITALS
OXYGEN SATURATION: 96 % | HEART RATE: 96 BPM | DIASTOLIC BLOOD PRESSURE: 90 MMHG | SYSTOLIC BLOOD PRESSURE: 130 MMHG | RESPIRATION RATE: 18 BRPM

## 2024-01-04 VITALS
DIASTOLIC BLOOD PRESSURE: 98 MMHG | SYSTOLIC BLOOD PRESSURE: 136 MMHG | WEIGHT: 255.07 LBS | OXYGEN SATURATION: 94 % | RESPIRATION RATE: 18 BRPM | HEART RATE: 110 BPM | TEMPERATURE: 98 F

## 2024-01-04 DIAGNOSIS — M25.562 PAIN IN LEFT KNEE: ICD-10-CM

## 2024-01-04 DIAGNOSIS — M79.605 PAIN IN LEFT LEG: ICD-10-CM
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
## 2024-01-04 DIAGNOSIS — R42 DIZZINESS AND GIDDINESS: ICD-10-CM

## 2024-01-04 DIAGNOSIS — E03.9 HYPOTHYROIDISM, UNSPECIFIED: ICD-10-CM
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
## 2024-01-04 DIAGNOSIS — F31.9 BIPOLAR DISORDER, UNSPECIFIED: ICD-10-CM
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
## 2024-01-04 LAB
ALBUMIN SERPL ELPH-MCNC: 4.3 G/DL — SIGNIFICANT CHANGE UP (ref 3.5–5.2)
ALBUMIN SERPL ELPH-MCNC: 4.3 G/DL — SIGNIFICANT CHANGE UP (ref 3.5–5.2)
ALP SERPL-CCNC: 75 U/L — SIGNIFICANT CHANGE UP (ref 30–115)
ALP SERPL-CCNC: 75 U/L — SIGNIFICANT CHANGE UP (ref 30–115)
ALT FLD-CCNC: 22 U/L — SIGNIFICANT CHANGE UP (ref 0–41)
ALT FLD-CCNC: 22 U/L — SIGNIFICANT CHANGE UP (ref 0–41)
ANION GAP SERPL CALC-SCNC: 10 MMOL/L — SIGNIFICANT CHANGE UP (ref 7–14)
ANION GAP SERPL CALC-SCNC: 10 MMOL/L — SIGNIFICANT CHANGE UP (ref 7–14)
AST SERPL-CCNC: 20 U/L — SIGNIFICANT CHANGE UP (ref 0–41)
AST SERPL-CCNC: 20 U/L — SIGNIFICANT CHANGE UP (ref 0–41)
BASOPHILS # BLD AUTO: 0.04 K/UL — SIGNIFICANT CHANGE UP (ref 0–0.2)
BASOPHILS # BLD AUTO: 0.04 K/UL — SIGNIFICANT CHANGE UP (ref 0–0.2)
BASOPHILS NFR BLD AUTO: 0.5 % — SIGNIFICANT CHANGE UP (ref 0–1)
BASOPHILS NFR BLD AUTO: 0.5 % — SIGNIFICANT CHANGE UP (ref 0–1)
BILIRUB SERPL-MCNC: <0.2 MG/DL — SIGNIFICANT CHANGE UP (ref 0.2–1.2)
BILIRUB SERPL-MCNC: <0.2 MG/DL — SIGNIFICANT CHANGE UP (ref 0.2–1.2)
BUN SERPL-MCNC: 9 MG/DL — LOW (ref 10–20)
BUN SERPL-MCNC: 9 MG/DL — LOW (ref 10–20)
CALCIUM SERPL-MCNC: 9.2 MG/DL — SIGNIFICANT CHANGE UP (ref 8.4–10.4)
CALCIUM SERPL-MCNC: 9.2 MG/DL — SIGNIFICANT CHANGE UP (ref 8.4–10.4)
CHLORIDE SERPL-SCNC: 101 MMOL/L — SIGNIFICANT CHANGE UP (ref 98–110)
CHLORIDE SERPL-SCNC: 101 MMOL/L — SIGNIFICANT CHANGE UP (ref 98–110)
CO2 SERPL-SCNC: 23 MMOL/L — SIGNIFICANT CHANGE UP (ref 17–32)
CO2 SERPL-SCNC: 23 MMOL/L — SIGNIFICANT CHANGE UP (ref 17–32)
CREAT SERPL-MCNC: 0.6 MG/DL — LOW (ref 0.7–1.5)
CREAT SERPL-MCNC: 0.6 MG/DL — LOW (ref 0.7–1.5)
EGFR: 116 ML/MIN/1.73M2 — SIGNIFICANT CHANGE UP
EGFR: 116 ML/MIN/1.73M2 — SIGNIFICANT CHANGE UP
EOSINOPHIL # BLD AUTO: 0.06 K/UL — SIGNIFICANT CHANGE UP (ref 0–0.7)
EOSINOPHIL # BLD AUTO: 0.06 K/UL — SIGNIFICANT CHANGE UP (ref 0–0.7)
EOSINOPHIL NFR BLD AUTO: 0.7 % — SIGNIFICANT CHANGE UP (ref 0–8)
EOSINOPHIL NFR BLD AUTO: 0.7 % — SIGNIFICANT CHANGE UP (ref 0–8)
GLUCOSE SERPL-MCNC: 103 MG/DL — HIGH (ref 70–99)
GLUCOSE SERPL-MCNC: 103 MG/DL — HIGH (ref 70–99)
HCT VFR BLD CALC: 44.3 % — SIGNIFICANT CHANGE UP (ref 37–47)
HCT VFR BLD CALC: 44.3 % — SIGNIFICANT CHANGE UP (ref 37–47)
HGB BLD-MCNC: 14.8 G/DL — SIGNIFICANT CHANGE UP (ref 12–16)
HGB BLD-MCNC: 14.8 G/DL — SIGNIFICANT CHANGE UP (ref 12–16)
IMM GRANULOCYTES NFR BLD AUTO: 0.9 % — HIGH (ref 0.1–0.3)
IMM GRANULOCYTES NFR BLD AUTO: 0.9 % — HIGH (ref 0.1–0.3)
LYMPHOCYTES # BLD AUTO: 2.99 K/UL — SIGNIFICANT CHANGE UP (ref 1.2–3.4)
LYMPHOCYTES # BLD AUTO: 2.99 K/UL — SIGNIFICANT CHANGE UP (ref 1.2–3.4)
LYMPHOCYTES # BLD AUTO: 34.1 % — SIGNIFICANT CHANGE UP (ref 20.5–51.1)
LYMPHOCYTES # BLD AUTO: 34.1 % — SIGNIFICANT CHANGE UP (ref 20.5–51.1)
MAGNESIUM SERPL-MCNC: 2.2 MG/DL — SIGNIFICANT CHANGE UP (ref 1.8–2.4)
MAGNESIUM SERPL-MCNC: 2.2 MG/DL — SIGNIFICANT CHANGE UP (ref 1.8–2.4)
MCHC RBC-ENTMCNC: 30 PG — SIGNIFICANT CHANGE UP (ref 27–31)
MCHC RBC-ENTMCNC: 30 PG — SIGNIFICANT CHANGE UP (ref 27–31)
MCHC RBC-ENTMCNC: 33.4 G/DL — SIGNIFICANT CHANGE UP (ref 32–37)
MCHC RBC-ENTMCNC: 33.4 G/DL — SIGNIFICANT CHANGE UP (ref 32–37)
MCV RBC AUTO: 89.9 FL — SIGNIFICANT CHANGE UP (ref 81–99)
MCV RBC AUTO: 89.9 FL — SIGNIFICANT CHANGE UP (ref 81–99)
MONOCYTES # BLD AUTO: 0.97 K/UL — HIGH (ref 0.1–0.6)
MONOCYTES # BLD AUTO: 0.97 K/UL — HIGH (ref 0.1–0.6)
MONOCYTES NFR BLD AUTO: 11.1 % — HIGH (ref 1.7–9.3)
MONOCYTES NFR BLD AUTO: 11.1 % — HIGH (ref 1.7–9.3)
NEUTROPHILS # BLD AUTO: 4.62 K/UL — SIGNIFICANT CHANGE UP (ref 1.4–6.5)
NEUTROPHILS # BLD AUTO: 4.62 K/UL — SIGNIFICANT CHANGE UP (ref 1.4–6.5)
NEUTROPHILS NFR BLD AUTO: 52.7 % — SIGNIFICANT CHANGE UP (ref 42.2–75.2)
NEUTROPHILS NFR BLD AUTO: 52.7 % — SIGNIFICANT CHANGE UP (ref 42.2–75.2)
NRBC # BLD: 0 /100 WBCS — SIGNIFICANT CHANGE UP (ref 0–0)
NRBC # BLD: 0 /100 WBCS — SIGNIFICANT CHANGE UP (ref 0–0)
PLATELET # BLD AUTO: 234 K/UL — SIGNIFICANT CHANGE UP (ref 130–400)
PLATELET # BLD AUTO: 234 K/UL — SIGNIFICANT CHANGE UP (ref 130–400)
PMV BLD: 10.3 FL — SIGNIFICANT CHANGE UP (ref 7.4–10.4)
PMV BLD: 10.3 FL — SIGNIFICANT CHANGE UP (ref 7.4–10.4)
POTASSIUM SERPL-MCNC: 4.3 MMOL/L — SIGNIFICANT CHANGE UP (ref 3.5–5)
POTASSIUM SERPL-MCNC: 4.3 MMOL/L — SIGNIFICANT CHANGE UP (ref 3.5–5)
POTASSIUM SERPL-SCNC: 4.3 MMOL/L — SIGNIFICANT CHANGE UP (ref 3.5–5)
POTASSIUM SERPL-SCNC: 4.3 MMOL/L — SIGNIFICANT CHANGE UP (ref 3.5–5)
PROT SERPL-MCNC: 7.3 G/DL — SIGNIFICANT CHANGE UP (ref 6–8)
PROT SERPL-MCNC: 7.3 G/DL — SIGNIFICANT CHANGE UP (ref 6–8)
RBC # BLD: 4.93 M/UL — SIGNIFICANT CHANGE UP (ref 4.2–5.4)
RBC # BLD: 4.93 M/UL — SIGNIFICANT CHANGE UP (ref 4.2–5.4)
RBC # FLD: 12.9 % — SIGNIFICANT CHANGE UP (ref 11.5–14.5)
RBC # FLD: 12.9 % — SIGNIFICANT CHANGE UP (ref 11.5–14.5)
SODIUM SERPL-SCNC: 134 MMOL/L — LOW (ref 135–146)
SODIUM SERPL-SCNC: 134 MMOL/L — LOW (ref 135–146)
WBC # BLD: 8.76 K/UL — SIGNIFICANT CHANGE UP (ref 4.8–10.8)
WBC # BLD: 8.76 K/UL — SIGNIFICANT CHANGE UP (ref 4.8–10.8)
WBC # FLD AUTO: 8.76 K/UL — SIGNIFICANT CHANGE UP (ref 4.8–10.8)
WBC # FLD AUTO: 8.76 K/UL — SIGNIFICANT CHANGE UP (ref 4.8–10.8)

## 2024-01-04 PROCEDURE — 70450 CT HEAD/BRAIN W/O DYE: CPT | Mod: MA

## 2024-01-04 PROCEDURE — 73590 X-RAY EXAM OF LOWER LEG: CPT | Mod: LT

## 2024-01-04 PROCEDURE — 36415 COLL VENOUS BLD VENIPUNCTURE: CPT

## 2024-01-04 PROCEDURE — 73610 X-RAY EXAM OF ANKLE: CPT | Mod: 26,LT

## 2024-01-04 PROCEDURE — 80053 COMPREHEN METABOLIC PANEL: CPT

## 2024-01-04 PROCEDURE — 73590 X-RAY EXAM OF LOWER LEG: CPT | Mod: 26,LT

## 2024-01-04 PROCEDURE — 73610 X-RAY EXAM OF ANKLE: CPT | Mod: LT

## 2024-01-04 PROCEDURE — 99285 EMERGENCY DEPT VISIT HI MDM: CPT | Mod: 25

## 2024-01-04 PROCEDURE — 73562 X-RAY EXAM OF KNEE 3: CPT | Mod: 26,LT

## 2024-01-04 PROCEDURE — 85025 COMPLETE CBC W/AUTO DIFF WBC: CPT

## 2024-01-04 PROCEDURE — 83735 ASSAY OF MAGNESIUM: CPT

## 2024-01-04 PROCEDURE — 73562 X-RAY EXAM OF KNEE 3: CPT | Mod: LT

## 2024-01-04 PROCEDURE — 99285 EMERGENCY DEPT VISIT HI MDM: CPT

## 2024-01-04 PROCEDURE — 70450 CT HEAD/BRAIN W/O DYE: CPT | Mod: 26,MA

## 2024-01-04 RX ORDER — MECLIZINE HCL 12.5 MG
50 TABLET ORAL ONCE
Refills: 0 | Status: COMPLETED | OUTPATIENT
Start: 2024-01-04 | End: 2024-01-04

## 2024-01-04 RX ORDER — SODIUM CHLORIDE 9 MG/ML
1000 INJECTION, SOLUTION INTRAVENOUS ONCE
Refills: 0 | Status: COMPLETED | OUTPATIENT
Start: 2024-01-04 | End: 2024-01-04

## 2024-01-04 RX ADMIN — Medication 50 MILLIGRAM(S): at 17:19

## 2024-01-04 RX ADMIN — SODIUM CHLORIDE 1000 MILLILITER(S): 9 INJECTION, SOLUTION INTRAVENOUS at 17:19

## 2024-01-04 NOTE — ED PROVIDER NOTE - CARE PROVIDER_API CALL
Claudy Trejo  Orthopaedic Surgery  [address] [place] [address]  Phone: [phone]  Fax: [phone]  Follow Up Time: 1-3 Days   Claudy Trejo  Orthopaedic Surgery  [address] [place] [address]  Phone: [phone]  Fax: [phone]  Follow Up Time: 1-3 Days

## 2024-01-04 NOTE — ED ADULT TRIAGE NOTE - CHIEF COMPLAINT QUOTE
Patient BIBA from home c/o dizziness and repeated falls without injury past few days. Reports similar symptoms have presented in past and was prescribed B12. Patient BIBA from home c/o dizziness and repeated falls without injury past few days. Reports similar symptoms have presented in past and was prescribed B12. Patient ambulates with steady gait. Patient BIBA from home c/o dizziness and repeated falls without injury past few days. Reports similar symptoms have presented in past and was prescribed B12. Patient ambulates with steady gait. NIH 0.

## 2024-01-04 NOTE — ED PROVIDER NOTE - PHYSICAL EXAMINATION
CONSTITUTIONAL: non-toxic appearing female, NAD    SKIN: skin exam is warm and dry  HEAD: Normocephalic; atraumatic  NECK: ROM intact.  CARD: S1, S2 normal, no murmur  RESP: No wheezes, rales or rhonchi. Good air movement bilaterally   EXT: LLE: +TTP overlying L lateral knee, no skin changes. steady gait.   NEURO: awake, alert, following commands, oriented, grossly unremarkable. No Focal deficits. GCS 15.   PSYCH: Cooperative, appropriate.

## 2024-01-04 NOTE — ED ADULT NURSE NOTE - NSFALLHARMRISKINTERV_ED_ALL_ED
Communicate risk of Fall with Harm to all staff, patient, and family/Provide visual cue: red socks, yellow wristband, yellow gown, etc/Reinforce activity limits and safety measures with patient and family/Bed in lowest position, wheels locked, appropriate side rails in place/Call bell, personal items and telephone in reach/Instruct patient to call for assistance before getting out of bed/chair/stretcher/Non-slip footwear applied when patient is off stretcher/Center to call system/Physically safe environment - no spills, clutter or unnecessary equipment/Purposeful Proactive Rounding/Room/bathroom lighting operational, light cord in reach Communicate risk of Fall with Harm to all staff, patient, and family/Provide visual cue: red socks, yellow wristband, yellow gown, etc/Reinforce activity limits and safety measures with patient and family/Bed in lowest position, wheels locked, appropriate side rails in place/Call bell, personal items and telephone in reach/Instruct patient to call for assistance before getting out of bed/chair/stretcher/Non-slip footwear applied when patient is off stretcher/[place] to call system/Physically safe environment - no spills, clutter or unnecessary equipment/Purposeful Proactive Rounding/Room/bathroom lighting operational, light cord in reach

## 2024-01-04 NOTE — ED PROVIDER NOTE - NSFOLLOWUPCLINICS_GEN_ALL_ED_FT
[facility]  Medicine  [address], [place]   Phone: [phone]  Fax:   Follow Up Time: 1-3 Days     [facility]  Medicine  [address], [place]   Phone: [phone]  Fax:   Follow Up Time: 1-3 Days

## 2024-01-04 NOTE — ED PROVIDER NOTE - OBJECTIVE STATEMENT
38 year old female, past medical history bipolar disorder, hypothyroidism, who presents s/p fall. patient reports lightheadedness that began yesterday while walking followed by mechanical trip and fall twisting L leg. did not hit head, no loc, no ac use. patient was able to stand and ambulate thereafter. patient c/o L knee/L leg pain since. denies headache, neck pain, chest pain, syncope, numbness/weakness.

## 2024-01-04 NOTE — ED PROVIDER NOTE - PATIENT PORTAL LINK FT
You can access the FollowMyHealth Patient Portal offered by [facility] by registering at the following website: [email or web address]. By joining [employer]’s FollowMyHealth portal, you will also be able to view your health information using other applications (apps) compatible with our system. You can access the FollowMyHealth Patient Portal offered by [facility] by registering at the following website: [email or web address]. By joining [employer]’s FollowMyHealth portal, you will also be able to view your health information using other applications (apps) compatible with our system.

## 2024-01-04 NOTE — ED PROVIDER NOTE - PROVIDER TOKENS
PROVIDER:[TOKEN:[[id number]],FOLLOWUP:[1-3 Days]] PROVIDER:[TOKEN:[[id number]],FOLLOWUP:[1-3 Days]]

## 2024-01-04 NOTE — ED ADULT NURSE NOTE - OBJECTIVE STATEMENT
Patient presents to ED for c/o dizziness and recurrent falls without injury. Patient reports single mechanical fall yesterday. Denies head trauma, -LOC, _anticoagulation use. Patient reports falling and injuring left knee s/p fall. Patient ambulates with steady gait. A&O x4.

## 2024-01-04 NOTE — ED PROVIDER NOTE - ATTENDING APP SHARED VISIT CONTRIBUTION OF CARE
38yF bipolar hypothyroid on olanzapine, depakote p/w fall - pt [place] apartment [place] the community and follows with [facility] as o/p - called her psychiatrist today to say that she fell at home yesterday - c/o L ankle vs knee pain

## 2024-01-04 NOTE — ED PROVIDER NOTE - NSFOLLOWUPINSTRUCTIONS_ED_ALL_ED_FT
Fall Prevention in the Home    Falls can cause injuries and can affect people from all age groups. There are many simple things that you can do to make your home safe and to help prevent falls.    WHAT CAN I DO ON THE OUTSIDE OF MY HOME?  Regularly repair the edges of walkways and driveways and fix any cracks.  Remove high doorway thresholds.  Trim any shrubbery on the main path into your home.  Use bright outdoor lighting.  Clear walkways of debris and clutter, including tools and rocks.  Regularly check that handrails are securely fastened and in good repair. Both sides of any steps should have handrails.  Install guardrails along the edges of any raised decks or porches.  Have leaves, snow, and ice cleared regularly.  Use sand or salt on walkways during winter months.  In the garage, clean up any spills right away, including grease or oil spills.    WHAT CAN I DO IN THE BATHROOM?  Use night lights.  Install grab bars by the toilet and in the tub and shower. Do not use towel bars as grab bars.  Use non-skid mats or decals on the floor of the tub or shower.  If you need to sit down while you are in the shower, use a plastic, non-slip stool.  Keep the floor dry. Immediately clean up any water that spills on the floor.  Remove soap buildup in the tub or shower on a regular basis.  Attach bath mats securely with double-sided non-slip rug tape.  Remove throw rugs and other tripping hazards from the floor.    WHAT CAN I DO IN THE BEDROOM?  Use night lights.  Make sure that a bedside light is easy to reach.  Do not use oversized bedding that drapes onto the floor.  Have a firm chair that has side arms to use for getting dressed.  Remove throw rugs and other tripping hazards from the floor.    WHAT CAN I DO IN THE KITCHEN?  Clean up any spills right away.  Avoid walking on wet floors.  Place frequently used items in easy-to-reach places.  If you need to reach for something above you, use a sturdy step stool that has a grab bar.  Keep electrical cables out of the way.  Do not use floor polish or wax that makes floors slippery. If you have to use wax, make sure that it is non-skid floor wax.  Remove throw rugs and other tripping hazards from the floor.    WHAT CAN I DO IN THE STAIRWAYS?  Do not leave any items on the stairs.  Make sure that there are handrails on both sides of the stairs. Fix handrails that are broken or loose. Make sure that handrails are as long as the stairways.  Check any carpeting to make sure that it is firmly attached to the stairs. Fix any carpet that is loose or worn.  Avoid having throw rugs at the top or bottom of stairways, or secure the rugs with carpet tape to prevent them from moving.  Make sure that you have a light switch at the top of the stairs and the bottom of the stairs. If you do not have them, have them installed.    WHAT ARE SOME OTHER FALL PREVENTION TIPS?  Wear closed-toe shoes that fit well and support your feet. Wear shoes that have rubber soles or low heels.  When you use a stepladder, make sure that it is completely opened and that the sides are firmly locked. Have someone hold the ladder while you are using it. Do not climb a closed stepladder.  Add color or contrast paint or tape to grab bars and handrails in your home. Place contrasting color strips on the first and last steps.  Use mobility aids as needed, such as canes, walkers, scooters, and crutches.  Turn on lights if it is dark. Replace any light bulbs that burn out.  Set up furniture so that there are clear paths. Keep the furniture in the same spot.  Fix any uneven floor surfaces.  Choose a carpet design that does not hide the edge of steps of a stairway.  Be aware of any and all pets.  Review your medicines with your healthcare provider. Some medicines can cause dizziness or changes in blood pressure, which increase your risk of falling.     Talk with your health care provider about other ways that you can decrease your risk of falls. This may include working with a physical therapist or  to improve your strength, balance, and endurance.    ADDITIONAL NOTES AND INSTRUCTIONS    Please follow up with your Primary MD in 24-48 hr.  Seek immediate medical care for any new/worsening signs or symptoms.         Leg Pain    WHAT YOU NEED TO KNOW:    Leg pain may be caused by a variety of health conditions. Your tests did not show any broken bones or blood clots.    DISCHARGE INSTRUCTIONS:    Return to the emergency department if:     You have a fever.      Your leg starts to swell.      Your leg pain gets worse.      You have numbness or tingling in your leg or toes.      You cannot put any weight on or move your leg.    Contact your healthcare provider if:     Your pain does not decrease, even after treatment.      You have questions or concerns about your condition or care.    Medicines:     NSAIDs, such as ibuprofen, help decrease swelling, pain, and fever. This medicine is available with or without a doctor's order. NSAIDs can cause stomach bleeding or kidney problems in certain people. If you take blood thinner medicine, always ask your healthcare provider if NSAIDs are safe for you. Always read the medicine label and follow directions.      Take your medicine as directed. Contact your healthcare provider if you think your medicine is not helping or if you have side effects. Tell him of her if you are allergic to any medicine. Keep a list of the medicines, vitamins, and herbs you take. Include the amounts, and when and why you take them. Bring the list or the pill bottles to follow-up visits. Carry your medicine list with you in case of an emergency.    Follow up with your healthcare provider as directed: You may need more tests to find the cause of your leg pain. You may need to see an orthopedic specialist or a physical therapist. Write down your questions so you remember to ask them during your visits.    Manage your leg pain:     Rest your injured leg so that it can heal. You may need an immobilizer, brace, or splint to limit the movement of your leg. You may need to avoid putting any weight on your leg for at least 48 hours. Return to normal activities as directed.      Ice the injury for 20 minutes every 4 hours for up to 24 hours, or as directed. Use an ice pack, or put crushed ice in a plastic bag. Cover it with a towel to protect your skin. Ice helps prevent tissue damage and decreases swelling and pain.      Elevate your injured leg above the level of your heart as often as you can. This will help decrease swelling and pain. If possible, prop your leg on pillows or blankets to keep the area elevated comfortably.       Use assistive devices as directed. You may need to use a cane or crutches. Assistive devices help decrease pain and pressure on your leg when you walk. Ask your healthcare provider for more information about assistive devices and how to use them correctly.      Maintain a healthy weight. Extra body weight can cause pressure and pain in your hip, knee, and ankle joints. Ask your healthcare provider how much you should weigh. Ask him to help you create a weight loss plan if you are overweight.         © Copyright [employer] 2019 All illustrations and images included in CareNotes are the copyrighted property of [employer] or [employer]. Fall Prevention in the Home    Falls can cause injuries and can affect people from all age groups. There are many simple things that you can do to make your home safe and to help prevent falls.    WHAT CAN I DO ON THE OUTSIDE OF MY HOME?  Regularly repair the edges of walkways and driveways and fix any cracks.  Remove high doorway thresholds.  Trim any shrubbery on the main path into your home.  Use bright outdoor lighting.  Clear walkways of debris and clutter, including tools and rocks.  Regularly check that handrails are securely fastened and in good repair. Both sides of any steps should have handrails.  Install guardrails along the edges of any raised decks or porches.  Have leaves, snow, and ice cleared regularly.  Use sand or salt on walkways during winter months.  In the garage, clean up any spills right away, including grease or oil spills.    WHAT CAN I DO IN THE BATHROOM?  Use night lights.  Install grab bars by the toilet and in the tub and shower. Do not use towel bars as grab bars.  Use non-skid mats or decals on the floor of the tub or shower.  If you need to sit down while you are in the shower, use a plastic, non-slip stool.  Keep the floor dry. Immediately clean up any water that spills on the floor.  Remove soap buildup in the tub or shower on a regular basis.  Attach bath mats securely with double-sided non-slip rug tape.  Remove throw rugs and other tripping hazards from the floor.    WHAT CAN I DO IN THE BEDROOM?  Use night lights.  Make sure that a bedside light is easy to reach.  Do not use oversized bedding that drapes onto the floor.  Have a firm chair that has side arms to use for getting dressed.  Remove throw rugs and other tripping hazards from the floor.    WHAT CAN I DO IN THE KITCHEN?  Clean up any spills right away.  Avoid walking on wet floors.  Place frequently used items in easy-to-reach places.  If you need to reach for something above you, use a sturdy step stool that has a grab bar.  Keep electrical cables out of the way.  Do not use floor polish or wax that makes floors slippery. If you have to use wax, make sure that it is non-skid floor wax.  Remove throw rugs and other tripping hazards from the floor.    WHAT CAN I DO IN THE STAIRWAYS?  Do not leave any items on the stairs.  Make sure that there are handrails on both sides of the stairs. Fix handrails that are broken or loose. Make sure that handrails are as long as the stairways.  Check any carpeting to make sure that it is firmly attached to the stairs. Fix any carpet that is loose or worn.  Avoid having throw rugs at the top or bottom of stairways, or secure the rugs with carpet tape to prevent them from moving.  Make sure that you have a light switch at the top of the stairs and the bottom of the stairs. If you do not have them, have them installed.    WHAT ARE SOME OTHER FALL PREVENTION TIPS?  Wear closed-toe shoes that fit well and support your feet. Wear shoes that have rubber soles or low heels.  When you use a stepladder, make sure that it is completely opened and that the sides are firmly locked. Have someone hold the ladder while you are using it. Do not climb a closed stepladder.  Add color or contrast paint or tape to grab bars and handrails in your home. Place contrasting color strips on the first and last steps.  Use mobility aids as needed, such as canes, walkers, scooters, and crutches.  Turn on lights if it is dark. Replace any light bulbs that burn out.  Set up furniture so that there are clear paths. Keep the furniture in the same spot.  Fix any uneven floor surfaces.  Choose a carpet design that does not hide the edge of steps of a stairway.  Be aware of any and all pets.  Review your medicines with your healthcare provider. Some medicines can cause dizziness or changes in blood pressure, which increase your risk of falling.     Talk with your health care provider about other ways that you can decrease your risk of falls. This may include working with a physical therapist or  to improve your strength, balance, and endurance.    ADDITIONAL NOTES AND INSTRUCTIONS    Please follow up with your Primary MD in 24-48 hr.  Seek immediate medical care for any new/worsening signs or symptoms.         Leg Pain    WHAT YOU NEED TO KNOW:    Leg pain may be caused by a variety of health conditions. Your tests did not show any broken bones or blood clots.    DISCHARGE INSTRUCTIONS:    Return to the emergency department if:     You have a fever.      Your leg starts to swell.      Your leg pain gets worse.      You have numbness or tingling in your leg or toes.      You cannot put any weight on or move your leg.    Contact your healthcare provider if:     Your pain does not decrease, even after treatment.      You have questions or concerns about your condition or care.    Medicines:     NSAIDs, such as ibuprofen, help decrease swelling, pain, and fever. This medicine is available with or without a doctor's order. NSAIDs can cause stomach bleeding or kidney problems in certain people. If you take blood thinner medicine, always ask your healthcare provider if NSAIDs are safe for you. Always read the medicine label and follow directions.      Take your medicine as directed. Contact your healthcare provider if you think your medicine is not helping or if you have side effects. Tell him of her if you are allergic to any medicine. Keep a list of the medicines, vitamins, and herbs you take. Include the amounts, and when and why you take them. Bring the list or the pill bottles to follow-up visits. Carry your medicine list with you in case of an emergency.    Follow up with your healthcare provider as directed: You may need more tests to find the cause of your leg pain. You may need to see an orthopedic specialist or a physical therapist. Write down your questions so you remember to ask them during your visits.    Manage your leg pain:     Rest your injured leg so that it can heal. You may need an immobilizer, brace, or splint to limit the movement of your leg. You may need to avoid putting any weight on your leg for at least 48 hours. Return to normal activities as directed.      Ice the injury for 20 minutes every 4 hours for up to 24 hours, or as directed. Use an ice pack, or put crushed ice in a plastic bag. Cover it with a towel to protect your skin. Ice helps prevent tissue damage and decreases swelling and pain.      Elevate your injured leg above the level of your heart as often as you can. This will help decrease swelling and pain. If possible, prop your leg on pillows or blankets to keep the area elevated comfortably.       Use assistive devices as directed. You may need to use a cane or crutches. Assistive devices help decrease pain and pressure on your leg when you walk. Ask your healthcare provider for more information about assistive devices and how to use them correctly.      Maintain a healthy weight. Extra body weight can cause pressure and pain in your hip, knee, and ankle joints. Ask your healthcare provider how much you should weigh. Ask him to help you create a weight loss plan if you are overweight.         © Copyright [employer] 2019 All illustrations and images included in CareNotes are the copyrighted property of [employer] or [employer].

## 2024-01-04 NOTE — ED PROVIDER NOTE - CLINICAL SUMMARY MEDICAL DECISION MAKING FREE TEXT BOX
38yF hypothyroid bipolar p/w LLE pain/swelling after fall w/ associated dizziness.  Pt w/o focal neuro deficits and ambulating w/ steady gait.  Labs reassuring.  Xray w/o fx or dislocation.  CTH w/o acute pathology.  EKG w/o ischemia or arrhythmia.  Recommend supportive care, o/p f/u, return precautions.

## 2024-01-04 NOTE — ED ADULT NURSE NOTE - CHIEF COMPLAINT QUOTE
Patient BIBA from home c/o dizziness and repeated falls without injury past few days. Reports similar symptoms have presented in past and was prescribed B12. Patient ambulates with steady gait. NIH 0.

## 2024-01-04 NOTE — ED PROVIDER NOTE - DIFFERENTIAL DIAGNOSIS
Differential Diagnosis LLE fx, dislocation, sprain, strain, arrhythmia, dehydration, electrolyte abnormality, medication side effect

## 2024-04-26 ENCOUNTER — APPOINTMENT (OUTPATIENT)
Dept: OBGYN | Facility: CLINIC | Age: 39
End: 2024-04-26
Payer: COMMERCIAL

## 2024-04-26 ENCOUNTER — OUTPATIENT (OUTPATIENT)
Dept: OUTPATIENT SERVICES | Facility: HOSPITAL | Age: 39
LOS: 1 days | End: 2024-04-26
Payer: COMMERCIAL

## 2024-04-26 VITALS
BODY MASS INDEX: 43.07 KG/M2 | DIASTOLIC BLOOD PRESSURE: 82 MMHG | HEIGHT: 66 IN | WEIGHT: 268 LBS | SYSTOLIC BLOOD PRESSURE: 120 MMHG

## 2024-04-26 DIAGNOSIS — Z30.430 ENCOUNTER FOR INSERTION OF INTRAUTERINE CONTRACEPTIVE DEVICE: ICD-10-CM

## 2024-04-26 DIAGNOSIS — Z01.419 ENCOUNTER FOR GYNECOLOGICAL EXAMINATION (GENERAL) (ROUTINE) W/OUT ABNORMAL FINDINGS: ICD-10-CM

## 2024-04-26 PROCEDURE — 87591 N.GONORRHOEAE DNA AMP PROB: CPT

## 2024-04-26 PROCEDURE — 99459 PELVIC EXAMINATION: CPT

## 2024-04-26 PROCEDURE — 88142 CYTOPATH C/V THIN LAYER: CPT

## 2024-04-26 PROCEDURE — 99386 PREV VISIT NEW AGE 40-64: CPT

## 2024-04-26 PROCEDURE — 96372 THER/PROPH/DIAG INJ SC/IM: CPT

## 2024-04-26 PROCEDURE — 99396 PREV VISIT EST AGE 40-64: CPT

## 2024-04-26 PROCEDURE — 87661 TRICHOMONAS VAGINALIS AMPLIF: CPT

## 2024-04-26 PROCEDURE — 87624 HPV HI-RISK TYP POOLED RSLT: CPT

## 2024-04-26 PROCEDURE — 81025 URINE PREGNANCY TEST: CPT

## 2024-04-26 PROCEDURE — 87491 CHLMYD TRACH DNA AMP PROBE: CPT

## 2024-04-26 RX ORDER — MEDROXYPROGESTERONE ACETATE 150 MG/ML
150 INJECTION, SUSPENSION INTRAMUSCULAR
Qty: 0 | Refills: 0 | Status: COMPLETED | OUTPATIENT
Start: 2024-04-26

## 2024-04-26 RX ADMIN — MEDROXYPROGESTERONE ACETATE 0 MG/ML: 150 INJECTION, SUSPENSION INTRAMUSCULAR at 00:00

## 2024-04-26 NOTE — PHYSICAL EXAM
[Chaperone Present] : A chaperone was present in the examining room during all aspects of the physical examination [[id number]] : A chaperone was present during the pelvic exam. [FreeTextEntry2] : taisha [Appropriately responsive] : appropriately responsive [Soft] : soft [Oriented x3] : oriented x3 [Examination Of The Breasts] : a normal appearance [No Masses] : no breast masses were palpable [Labia Majora] : normal [Labia Minora] : normal [Normal] : normal [Uterine Adnexae] : normal

## 2024-04-26 NOTE — DISCUSSION/SUMMARY
[FreeTextEntry1] : 37yo annual exam and depo given f/u in 3 mo std screen pap/hpv mammo scheduled at [facility]

## 2024-04-26 NOTE — HISTORY OF PRESENT ILLNESS
[FreeTextEntry1] : 39yo annual exam and depo late for depo, u preg neg, pt reports using condoms as well last pap/HPV neg

## 2024-04-27 LAB
C TRACH RRNA SPEC QL NAA+PROBE: NOT DETECTED
HCG UR QL: NEGATIVE
N GONORRHOEA RRNA SPEC QL NAA+PROBE: NOT DETECTED
SOURCE AMPLIFICATION: NORMAL

## 2024-04-29 ENCOUNTER — OUTPATIENT (OUTPATIENT)
Dept: OUTPATIENT SERVICES | Facility: HOSPITAL | Age: 39
LOS: 1 days | End: 2024-04-29

## 2024-04-29 DIAGNOSIS — Z01.419 ENCOUNTER FOR GYNECOLOGICAL EXAMINATION (GENERAL) (ROUTINE) WITHOUT ABNORMAL FINDINGS: ICD-10-CM

## 2024-04-29 LAB
SOURCE AMPLIFICATION: NORMAL
T VAGINALIS RRNA SPEC QL NAA+PROBE: NOT DETECTED

## 2024-04-30 DIAGNOSIS — Z01.419 ENCOUNTER FOR GYNECOLOGICAL EXAMINATION (GENERAL) (ROUTINE) WITHOUT ABNORMAL FINDINGS: ICD-10-CM

## 2024-05-01 LAB — HPV HIGH+LOW RISK DNA PNL CVX: NOT DETECTED

## 2024-05-10 NOTE — ED ADULT NURSE NOTE - BREATHING, MLM
aerobic capacity/endurance/gait, locomotion, and balance/muscle strength Spontaneous, unlabored and symmetrical

## 2024-05-17 LAB — CYTOLOGY CVX/VAG DOC THIN PREP: ABNORMAL

## 2024-05-31 ENCOUNTER — APPOINTMENT (OUTPATIENT)
Dept: OBGYN | Facility: CLINIC | Age: 39
End: 2024-05-31

## 2024-06-12 ENCOUNTER — OUTPATIENT (OUTPATIENT)
Dept: OUTPATIENT SERVICES | Facility: HOSPITAL | Age: 39
LOS: 1 days | End: 2024-06-12

## 2024-06-12 DIAGNOSIS — K03.81 CRACKED TOOTH: ICD-10-CM

## 2024-06-12 DIAGNOSIS — K01.1 IMPACTED TEETH: ICD-10-CM

## 2024-06-12 PROCEDURE — D7250: CPT

## 2024-07-26 ENCOUNTER — APPOINTMENT (OUTPATIENT)
Dept: OBGYN | Facility: CLINIC | Age: 39
End: 2024-07-26
Payer: COMMERCIAL

## 2024-07-26 VITALS — SYSTOLIC BLOOD PRESSURE: 114 MMHG | BODY MASS INDEX: 41.8 KG/M2 | WEIGHT: 259 LBS | DIASTOLIC BLOOD PRESSURE: 83 MMHG

## 2024-07-26 DIAGNOSIS — Z30.42 ENCOUNTER FOR SURVEILLANCE OF INJECTABLE CONTRACEPTIVE: ICD-10-CM

## 2024-07-26 DIAGNOSIS — N87.9 DYSPLASIA OF CERVIX UTERI, UNSPECIFIED: ICD-10-CM

## 2024-07-26 PROCEDURE — 99213 OFFICE O/P EST LOW 20 MIN: CPT

## 2024-07-26 RX ORDER — MEDROXYPROGESTERONE ACETATE 150 MG/ML
150 INJECTION, SUSPENSION INTRAMUSCULAR
Qty: 0 | Refills: 0 | Status: COMPLETED | OUTPATIENT
Start: 2024-07-26

## 2024-07-26 RX ADMIN — MEDROXYPROGESTERONE ACETATE 0 MG/ML: 150 INJECTION, SUSPENSION INTRAMUSCULAR at 00:00

## 2024-07-26 NOTE — END OF VISIT
"   07/26/18 5021   Child Life   Location Surgery  (Anesthesia out of OR CT)   Intervention Procedure Support;Family Support;Developmental Play   Procedure Support Comment Pt arrived to preop room anxious and tearful.  Provided developmentally appropriate toys for normalization to environment.  Bubbles utilized as distraction tool during vitials, but pt did not engage in distraction.  Per mother, \"nothing will distract pt as pt is fearful of medical setting.\"   Family Support Comment Pt's mother and father present and supportive.   Anxiety Severe Anxiety   Reaction to Separation from Parents crying   Fears/Concerns medical staff;medical equipment;medical procedures;new situations   Techniques Used to [place]/Comfort/Calm family presence;favorite toy/object/blanket   Able to Shift Focus From Anxiety Difficult   Outcomes/Follow Up Provided Materials     " [] : Resident [Time Spent: ___ minutes] : I have spent [unfilled] minutes of time on the encounter.

## 2024-07-26 NOTE — HISTORY OF PRESENT ILLNESS
[FreeTextEntry1] : Patient is a 37yo here for depo injection and to discuss results of pap smear form 4/26/24.  Patient has a h/o of cervical dysplasia and discussed next step being a colposcopy.  Patient is currently happy on depo and would like to continue with this method.  Patient is without complaints at this time.

## 2024-07-26 NOTE — PLAN
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

## 2024-09-05 NOTE — ED PROVIDER NOTE - IV ALTEPLASE EXCL REL HIDDEN
show
platelet transfusion completed without any acute events. will discharge home, patient given dispo and return precautions by previous team.

## 2024-09-18 ENCOUNTER — OUTPATIENT (OUTPATIENT)
Dept: OUTPATIENT SERVICES | Facility: HOSPITAL | Age: 39
LOS: 1 days | End: 2024-09-18
Payer: COMMERCIAL

## 2024-09-18 ENCOUNTER — APPOINTMENT (OUTPATIENT)
Dept: OBGYN | Facility: CLINIC | Age: 39
End: 2024-09-18
Payer: COMMERCIAL

## 2024-09-18 VITALS
WEIGHT: 256 LBS | DIASTOLIC BLOOD PRESSURE: 66 MMHG | SYSTOLIC BLOOD PRESSURE: 110 MMHG | BODY MASS INDEX: 41.14 KG/M2 | HEIGHT: 66 IN

## 2024-09-18 DIAGNOSIS — N87.9 DYSPLASIA OF CERVIX UTERI, UNSPECIFIED: ICD-10-CM

## 2024-09-18 DIAGNOSIS — R87.810 CERVICAL HIGH RISK HUMAN PAPILLOMAVIRUS (HPV) DNA TEST POSITIVE: ICD-10-CM

## 2024-09-18 PROCEDURE — 57454 BX/CURETT OF CERVIX W/SCOPE: CPT

## 2024-09-18 PROCEDURE — 88360 TUMOR IMMUNOHISTOCHEM/MANUAL: CPT

## 2024-09-18 PROCEDURE — 81025 URINE PREGNANCY TEST: CPT

## 2024-09-18 PROCEDURE — 88305 TISSUE EXAM BY PATHOLOGIST: CPT

## 2024-09-18 PROCEDURE — 88342 IMHCHEM/IMCYTCHM 1ST ANTB: CPT

## 2024-09-18 PROCEDURE — 99213 OFFICE O/P EST LOW 20 MIN: CPT | Mod: 25

## 2024-09-18 PROCEDURE — 57452 EXAM OF CERVIX W/SCOPE: CPT

## 2024-09-20 LAB — HCG UR QL: NEGATIVE

## 2024-09-23 NOTE — HISTORY OF PRESENT ILLNESS
[FreeTextEntry1] : 40yo, h/o ASCUS with HPV 18/45, s/p MAURICE 1/2 on colpo and negative LEEP, presents for colposcopy     Cervical cancer screening history: 04/2024: PAP ASCUS HPV neg   03/2023: PAP/HPV neg  09/2022: LEEP: negative  07/2022: Colposcopy MAURICE 1/2, ECC neg  02/2022: Pap ASCUS HPV 18/45  13/2019: PAP/HPV neg  12/2016: PAP/HPV neg

## 2024-09-23 NOTE — PLAN
[FreeTextEntry1] : 38yo, h/o ASCUS with HPV 18/45, s/p MAURICE 1/2 on colpo and negative LEEP, presents for colposcopy  - f/u pathology no abdominal pain, no bloating, no constipation, no diarrhea, no nausea and no vomiting.

## 2024-09-23 NOTE — PROCEDURE
[Colposcopy] : Colposcopy  [Risks] : risks [Benefits] : benefits [Patient] : patient [Infection] : infection [Bleeding] : bleeding [ASCUS] : ASCUS [ECC Performed] : ECC performed [Hemostasis Obtained] : Hemostasis obtained [Tolerated Well] : the patient tolerated the procedure well [SCI Fully Visualized] : SCI not fully visualized [de-identified] : acetic acid applied [de-identified] : acetowhite lesion at 1oclock fine punctate lesion at 6oclock [de-identified] : 1 and 6oclock  ECC [de-identified] : [place]  [de-identified] : low grade

## 2024-09-23 NOTE — PROCEDURE
[Colposcopy] : Colposcopy  [Risks] : risks [Benefits] : benefits [Patient] : patient [Infection] : infection [Bleeding] : bleeding [ASCUS] : ASCUS [ECC Performed] : ECC performed [Hemostasis Obtained] : Hemostasis obtained [Tolerated Well] : the patient tolerated the procedure well [SCI Fully Visualized] : SCI not fully visualized [de-identified] : acetic acid applied [de-identified] : acetowhite lesion at 1oclock fine punctate lesion at 6oclock [de-identified] : 1 and 6oclock  ECC [de-identified] : [place]  [de-identified] : low grade

## 2024-09-23 NOTE — PLAN
[FreeTextEntry1] : 38yo, h/o ASCUS with HPV 18/45, s/p MAURICE 1/2 on colpo and negative LEEP, presents for colposcopy  - f/u pathology

## 2024-09-25 DIAGNOSIS — N87.9 DYSPLASIA OF CERVIX UTERI, UNSPECIFIED: ICD-10-CM

## 2024-09-25 DIAGNOSIS — Z00.00 ENCOUNTER FOR GENERAL ADULT MEDICAL EXAMINATION WITHOUT ABNORMAL FINDINGS: ICD-10-CM

## 2024-09-28 LAB — CORE LAB BIOPSY: NORMAL

## 2024-11-01 ENCOUNTER — APPOINTMENT (OUTPATIENT)
Dept: OBGYN | Facility: CLINIC | Age: 39
End: 2024-11-01
Payer: COMMERCIAL

## 2024-11-01 ENCOUNTER — NON-APPOINTMENT (OUTPATIENT)
Age: 39
End: 2024-11-01

## 2024-11-01 ENCOUNTER — OUTPATIENT (OUTPATIENT)
Dept: OUTPATIENT SERVICES | Facility: HOSPITAL | Age: 39
LOS: 1 days | End: 2024-11-01
Payer: COMMERCIAL

## 2024-11-01 VITALS
WEIGHT: 260 LBS | BODY MASS INDEX: 41.78 KG/M2 | HEIGHT: 66 IN | SYSTOLIC BLOOD PRESSURE: 110 MMHG | DIASTOLIC BLOOD PRESSURE: 82 MMHG

## 2024-11-01 DIAGNOSIS — Z30.42 ENCOUNTER FOR SURVEILLANCE OF INJECTABLE CONTRACEPTIVE: ICD-10-CM

## 2024-11-01 PROCEDURE — 81025 URINE PREGNANCY TEST: CPT

## 2024-11-01 PROCEDURE — 99212 OFFICE O/P EST SF 10 MIN: CPT

## 2024-11-01 PROCEDURE — 96372 THER/PROPH/DIAG INJ SC/IM: CPT

## 2024-11-04 LAB
HCG UR QL: NEGATIVE
QUALITY CONTROL: YES

## 2024-11-14 ENCOUNTER — OUTPATIENT (OUTPATIENT)
Dept: OUTPATIENT SERVICES | Facility: HOSPITAL | Age: 39
LOS: 1 days | End: 2024-11-14
Payer: SELF-PAY

## 2024-11-14 DIAGNOSIS — K02.9 DENTAL CARIES, UNSPECIFIED: ICD-10-CM

## 2024-11-14 PROCEDURE — D0140: CPT

## 2024-11-14 PROCEDURE — D0220: CPT

## 2024-11-14 PROCEDURE — D7250: CPT

## 2024-11-29 DIAGNOSIS — K08.3 RETAINED DENTAL ROOT: ICD-10-CM

## 2025-01-09 NOTE — PROGRESS NOTE BEHAVIORAL HEALTH - NS ED [facility] MED ROS PSYCHIATRIC
" LOS: 4 days   Patient Care Team:  Tera Salvador MD as PCP - General (Internal Medicine)  Micah Reyes MD as Consulting Physician (Gastroenterology)  Bruna Chávez MD as Referring Physician (Cardiology)    Chief Complaint: post op follow-up    Subjective  Feeling good.     Vital Signs  Temp:  [98.1 °F (36.7 °C)-100.8 °F (38.2 °C)] 98.1 °F (36.7 °C)  Heart Rate:  [47-80] 60  Resp:  [13-18] 14  BP: ()/(59-82) 137/77  Arterial Line BP: ()/(29-63) 139/54  FiO2 (%):  [40 %-99 %] 40 %  Body mass index is 31.33 kg/m².    Intake/Output Summary (Last 24 hours) at 1/9/2025 0716  Last data filed at 1/9/2025 0642  Gross per 24 hour   Intake 3234.95 ml   Output 4659 ml   Net -1424.05 ml     No intake/output data recorded.    Chest tube drainage last 8 [id number]        01/05/25  1415 01/06/25  0556 01/08/25  0522   Weight: 112 kg (246 lb 3.2 oz) 110 kg (243 lb 6.4 oz) 111 kg (244 lb)         Objective:  General Appearance:  In no acute distress.    Vital signs: (most recent): Blood pressure 137/77, pulse 60, temperature 98.2 °F (36.8 °C), temperature source Axillary, resp. rate 11, height 188 cm (74\"), weight 111 kg (244 lb), SpO2 98%.  Vital signs are normal.    Output: Producing urine.    Lungs:  Normal effort and normal respiratory rate.    Heart: Normal rate.    Abdomen: Abdomen is soft.    Neurological: Patient is alert and oriented to person, place and time.                   Results Review:        WBC WBC   Date Value Ref Range Status   01/09/2025 16.57 (H) 3.40 - 10.80 10*3/mm3 Final   01/08/2025 17.20 (H) 3.40 - 10.80 10*3/mm3 Final   01/08/2025 4.96 3.40 - 10.80 10*3/mm3 Final   01/08/2025 5.48 3.40 - 10.80 10*3/mm3 Final   01/07/2025 5.11 3.40 - 10.80 10*3/mm3 Final   01/06/2025 5.48 3.40 - 10.80 10*3/mm3 Final      HGB Hemoglobin   Date Value Ref Range Status   01/09/2025 8.8 (L) 13.0 - 17.7 g/dL Final   01/08/2025 9.6 (L) 13.0 - 17.7 g/dL Final   01/08/2025 10.1 (L) 13.0 - 17.7 g/dL Final "
  01/08/2025 9.2 (L) 12.0 - 17.0 g/dL Final   01/08/2025 9.9 (L) 12.0 - 17.0 g/dL Final   01/08/2025 9.9 (L) 12.0 - 17.0 g/dL Final   01/08/2025 9.5 (L) 12.0 - 17.0 g/dL Final   01/08/2025 8.8 (L) 12.0 - 17.0 g/dL Final   01/08/2025 11.6 (L) 12.0 - 17.0 g/dL Final   01/08/2025 11.6 (L) 13.0 - 17.7 g/dL Final   01/07/2025 12.1 (L) 13.0 - 17.7 g/dL Final   01/06/2025 12.4 (L) 13.0 - 17.7 g/dL Final      HCT Hematocrit   Date Value Ref Range Status   01/09/2025 27.6 (L) 37.5 - 51.0 % Final   01/08/2025 32.2 (L) 37.5 - 51.0 % Final   01/08/2025 31.5 (L) 37.5 - 51.0 % Final   01/08/2025 27 (L) 38 - 51 % Final   01/08/2025 29 (L) 38 - 51 % Final   01/08/2025 29 (L) 38 - 51 % Final   01/08/2025 28 (L) 38 - 51 % Final   01/08/2025 26 (L) 38 - 51 % Final   01/08/2025 34 (L) 38 - 51 % Final   01/08/2025 35.7 (L) 37.5 - 51.0 % Final   01/07/2025 37.4 (L) 37.5 - 51.0 % Final   01/06/2025 37.9 37.5 - 51.0 % Final      Platelets Platelets   Date Value Ref Range Status   01/09/2025 142 140 - 450 10*3/mm3 Final   01/08/2025 174 140 - 450 10*3/mm3 Final   01/08/2025 168 140 - 450 10*3/mm3 Final   01/08/2025 166 140 - 450 10*3/mm3 Final   01/08/2025 243 140 - 450 10*3/mm3 Final   01/07/2025 243 140 - 450 10*3/mm3 Final   01/06/2025 233 140 - 450 10*3/mm3 Final        PT/INR:    Protime   Date Value Ref Range Status   01/09/2025 16.8 (H) 11.7 - 14.2 Seconds Final   01/08/2025 17.7 (H) 11.7 - 14.2 Seconds Final   01/06/2025 15.7 (H) 11.7 - 14.2 Seconds Final   /  INR   Date Value Ref Range Status   01/09/2025 1.33 (H) 0.90 - 1.10 Final   01/08/2025 1.43 (H) 0.90 - 1.10 Final   01/06/2025 1.23 (H) 0.90 - 1.10 Final       Sodium Sodium   Date Value Ref Range Status   01/09/2025 140 136 - 145 mmol/L Final   01/08/2025 141 136 - 145 mmol/L Final   01/08/2025 139 136 - 145 mmol/L Final   01/08/2025 144 136 - 145 mmol/L Final      Potassium Potassium   Date Value Ref Range Status   01/09/2025 4.8 3.5 - 5.2 mmol/L Final   01/08/2025 3.7 3.5 - 
See HPI
5.2 mmol/L Final   01/08/2025 3.7 3.5 - 5.2 mmol/L Final   01/08/2025 3.7 3.5 - 5.2 mmol/L Final     Comment:     Slight hemolysis detected by analyzer. Result may be falsely elevated.   01/08/2025 3.5 3.5 - 5.2 mmol/L Final   01/06/2025 4.2 3.5 - 5.2 mmol/L Final      Chloride Chloride   Date Value Ref Range Status   01/09/2025 109 (H) 98 - 107 mmol/L Final   01/08/2025 107 98 - 107 mmol/L Final   01/08/2025 108 (H) 98 - 107 mmol/L Final   01/08/2025 107 98 - 107 mmol/L Final      Bicarbonate CO2   Date Value Ref Range Status   01/09/2025 20.0 (L) 22.0 - 29.0 mmol/L Final   01/08/2025 19.0 (L) 22.0 - 29.0 mmol/L Final   01/08/2025 19.9 (L) 22.0 - 29.0 mmol/L Final   01/08/2025 25.8 22.0 - 29.0 mmol/L Final      BUN BUN   Date Value Ref Range Status   01/09/2025 23 8 - 23 mg/dL Final   01/08/2025 21 8 - 23 mg/dL Final   01/08/2025 18 8 - 23 mg/dL Final   01/08/2025 18 8 - 23 mg/dL Final      Creatinine Creatinine   Date Value Ref Range Status   01/09/2025 1.98 (H) 0.76 - 1.27 mg/dL Final   01/08/2025 1.73 (H) 0.76 - 1.27 mg/dL Final   01/08/2025 1.42 (H) 0.76 - 1.27 mg/dL Final   01/08/2025 1.27 0.76 - 1.27 mg/dL Final      Calcium Calcium   Date Value Ref Range Status   01/09/2025 8.4 (L) 8.6 - 10.5 mg/dL Final   01/08/2025 8.4 (L) 8.6 - 10.5 mg/dL Final   01/08/2025 8.7 8.6 - 10.5 mg/dL Final   01/08/2025 8.7 8.6 - 10.5 mg/dL Final      Magnesium Magnesium   Date Value Ref Range Status   01/09/2025 2.8 (H) 1.6 - 2.4 mg/dL Final   01/08/2025 2.4 1.6 - 2.4 mg/dL Final   01/08/2025 2.6 (H) 1.6 - 2.4 mg/dL Final   01/08/2025 2.2 1.6 - 2.4 mg/dL Final          aspirin, 81 mg, Oral, Daily  atorvastatin, 40 mg, Oral, Nightly  ceFAZolin, 2 g, Intravenous, Q8H  chlorhexidine, 15 mL, Mouth/Throat, Q12H  enoxaparin, 40 mg, Subcutaneous, Daily  escitalopram, 15 mg, Oral, Q PM  magnesium sulfate, 2 g, Intravenous, Q8H  metoclopramide, 10 mg, Intravenous, Q6H  metoprolol tartrate, 12.5 mg, Oral, Q12H  mupirocin, 1 Application, 
See HPI
Each Nare, BID  pantoprazole, 40 mg, Oral, QAM  senna-docusate sodium, 2 tablet, Oral, Nightly  terazosin, 5 mg, Oral, Nightly      amiodarone, 0.5 mg/min, Last Rate: 0.5 mg/min (01/09/25 0642)  clevidipine, 2-32 mg/hr  dexmedetomidine, 0.2-1.5 mcg/kg/hr, Last Rate: Stopped (01/08/25 1500)  DOPamine, 2-20 mcg/kg/min  EPINEPHrine, 0.02-0.1 mcg/kg/min  insulin, 0-100 Units/hr, Last Rate: 3.2 Units/hr (01/09/25 0708)  milrinone, 0.25-0.375 mcg/kg/min, Last Rate: 0.125 mcg/kg/min (01/09/25 0029)  niCARdipine, 5-15 mg/hr  nitroglycerin, 5-200 mcg/min  norepinephrine, 0.02-0.2 mcg/kg/min, Last Rate: Stopped (01/09/25 0130)  phenylephrine, 0.2-2 mcg/kg/min  propofol, 5-50 mcg/kg/min, Last Rate: Stopped (01/08/25 1200)              Mitral valve regurgitation    Severe mitral regurgitation      Assessment & Plan    - Severe mitral valve regurgitation - s/p MV repair, MAZE (no op report at this time)- Robin 1/8/2025  - CAD with previous stent to LAD (2022)  -NICM, EF 40% intra-op YAZMIN  - Atrial fibrillation   - DM II  - HTN  - HLD  - HOLLI with CPAP  -renal insuffiencey   -post op anemia- expected acute blood loss     POD#1  Looks good this morning. Up in the chair.  Looks sinus this morning with a prolonged IN interval, did have some junctional rhythms over night--- on IV amiodarone. Will add PO amiodarone.  Creatinine increased 1.9-- looks like his baseline is around 1.3-1.5 monitor UOP closely.  On milrinone 0.125-- will leave today with RV function and increase in creatinine  CXR with congestion, swan was coiled-- has been removed  Encourage pulmonary toilet-- add mucinex and flutter valve.  Discussed with Dr. Case  Continue supportive care      Nerissa Pérez TIFFANIE  01/09/25  07:16 EST    
See HPI

## 2025-01-17 ENCOUNTER — EMERGENCY (EMERGENCY)
Facility: HOSPITAL | Age: 40
LOS: 0 days | Discharge: HOME | End: 2025-01-17
Attending: EMERGENCY MEDICINE
Payer: SELF-PAY

## 2025-01-17 VITALS
OXYGEN SATURATION: 97 % | HEIGHT: 68.9 IN | WEIGHT: 263.89 LBS | DIASTOLIC BLOOD PRESSURE: 84 MMHG | TEMPERATURE: 98 F | RESPIRATION RATE: 18 BRPM | HEART RATE: 93 BPM | SYSTOLIC BLOOD PRESSURE: 126 MMHG

## 2025-01-17 DIAGNOSIS — R09.81 NASAL CONGESTION: ICD-10-CM

## 2025-01-17 DIAGNOSIS — R05.1 ACUTE COUGH: ICD-10-CM

## 2025-01-17 DIAGNOSIS — J20.9 ACUTE BRONCHITIS, UNSPECIFIED: ICD-10-CM

## 2025-01-17 PROCEDURE — 71046 X-RAY EXAM CHEST 2 VIEWS: CPT | Mod: 26

## 2025-01-17 PROCEDURE — 82962 GLUCOSE BLOOD TEST: CPT

## 2025-01-17 PROCEDURE — 99283 EMERGENCY DEPT VISIT LOW MDM: CPT | Mod: 25

## 2025-01-17 PROCEDURE — 71046 X-RAY EXAM CHEST 2 VIEWS: CPT

## 2025-01-17 PROCEDURE — 99284 EMERGENCY DEPT VISIT MOD MDM: CPT

## 2025-01-17 RX ORDER — METHYLPREDNISOLONE 4 MG/1
1 TABLET ORAL
Qty: 1 | Refills: 0
Start: 2025-01-17 | End: 2025-01-22

## 2025-01-17 RX ORDER — AZITHROMYCIN MONOHYDRATE 200 MG/5ML
1 POWDER, FOR SUSPENSION ORAL
Qty: 6 | Refills: 0
Start: 2025-01-17 | End: 2025-01-21

## 2025-01-17 NOTE — ED PROVIDER NOTE - CLINICAL SUMMARY MEDICAL DECISION MAKING FREE TEXT BOX
Patient presenting for evaluation of cough congestion.  Well appearing, NAD, non toxic. NCAT PERRLA EOMI neck supple non tender normal wob coarse breath sounds to left base rrr abdomen s nt nd no rebound no guarding [id number] neuro non focal.  Imaging reviewed.  Comfortable with discharge and follow-up outpatient, strict return precautions given. Endorses understanding of all of this and aware that they can return at any time for new or concerning symptoms. No further questions or concerns at this time

## 2025-01-17 NOTE — ED PROVIDER NOTE - PHYSICAL EXAMINATION
Physical Exam    Vital Signs: I have reviewed the initial vital signs.  Constitutional: well-nourished, appears stated age, no acute distress  Eyes: Conjunctiva pink, Sclera clear, PERRLA, EOMI.  Cardiovascular: S1 and S2, regular rate, regular rhythm, well-perfused extremities, radial pulses equal and 2+  Respiratory: unlabored respiratory effort, slight exp wheezing bilat at bases with slight rhonchi on R   Gastrointestinal: soft, non-tender abdomen, no pulsatile mass, normal bowl sounds  Musculoskeletal: supple neck, no lower extremity edema, no midline tenderness  Integumentary: warm, dry, no rash  Neurologic: awake, alert, cranial nerves II-XII grossly intact, extremities’ motor and sensory functions grossly intact  Psychiatric: appropriate mood, appropriate affect

## 2025-01-17 NOTE — ED PROVIDER NOTE - OBJECTIVE STATEMENT
Pt is a 39 year old female with PMH noted in chart presents to ED with complaints of cough and congestion for past 2 weeks. Pt states cough is at time prod with yellow sputum, has some wheezing. Known hx of recurrent bronchitis, last episode last year. current every day smoker. Pt states coughing fits cause her to become dizzy. Pt denies any fever, chills, bodyaches, chest pain, sob, abdominal pain, NVCD

## 2025-01-17 NOTE — ED ADULT NURSE NOTE - NSFALLUNIVINTERV_ED_ALL_ED
Bed/Stretcher in lowest position, wheels locked, appropriate side rails in place/Call bell, personal items and telephone in reach/Instruct patient to call for assistance before getting out of bed/chair/stretcher/Non-slip footwear applied when patient is off stretcher/[place] to call system/Physically safe environment - no spills, clutter or unnecessary equipment/Purposeful proactive rounding/Room/bathroom lighting operational, light cord in reach

## 2025-01-17 NOTE — ED PROVIDER NOTE - NSFOLLOWUPINSTRUCTIONS_ED_ALL_ED_FT
Follow up with your primary medical doctor in 1-2 days     Upper Respiratory Infection, Adult  An upper respiratory infection (URI) is a common viral infection of the nose, throat, and upper air passages that lead to the lungs. The most common type of URI is the common cold. URIs usually get better on their own, without medical treatment.    What are the causes?  A URI is caused by a virus. You may catch a virus by:    Breathing in droplets from an infected person's cough or sneeze.  Touching something that has been exposed to the virus (contaminated) and then touching your mouth, nose, or eyes.    What increases the risk?  You are more likely to get a URI if:    You are very young or very old.  It is deepa or winter.  You have close contact with others, such as at a , school, or health care facility.  You smoke.  You have long-term (chronic) heart or lung disease.  You have a weakened disease-fighting (immune) system.  You have nasal allergies or asthma.  You are experiencing a lot of stress.  You work in an area that has poor air circulation.  You have poor nutrition.    What are the signs or symptoms?  A URI usually involves some of the following symptoms:    Runny or stuffy (congested) nose.  Sneezing.  Cough.  Sore throat.  Headache.  Fatigue.  Fever.  Loss of appetite.  Pain in your forehead, behind your eyes, and over your cheekbones (sinus pain).  Muscle aches.  Redness or irritation of the eyes.  Pressure in the ears or face.    How is this diagnosed?  This condition may be diagnosed based on your medical history and symptoms, and a physical exam. Your health care provider may use a cotton swab to take a mucus sample from your nose (nasal swab). This sample can be tested to determine what virus is causing the illness.    How is this treated?  URIs usually get better on their own within 7–10 days. You can take steps at home to relieve your symptoms. Medicines cannot cure URIs, but your health care provider may recommend certain medicines to help relieve symptoms, such as:    Over-the-counter cold medicines.  Cough suppressants. Coughing is a type of defense against infection that helps to clear the respiratory system, so take these medicines only as recommended by your health care provider.  Fever-reducing medicines.    Follow these instructions at home:  Activity     Rest as needed.  If you have a fever, stay home from work or school until your fever is gone or until your health care provider says you are no longer contagious. Your health care provider may have you wear a face mask to prevent your infection from spreading.  Relieving symptoms     Gargle with a salt-water mixture 3–4 times a day or as needed. To make a salt-water mixture, completely dissolve ½–1 tsp of salt in 1 cup of warm water.  Use a cool-mist humidifier to add moisture to the air. This can help you breathe more easily.  Eating and drinking     Drink enough fluid to keep your urine pale yellow.  ImageEat soups and other clear broths.  General instructions     Take over-the-counter and prescription medicines only as told by your health care provider. These include cold medicines, fever reducers, and cough suppressants.  Do not use any products that contain nicotine or tobacco, such as cigarettes and e-cigarettes. If you need help quitting, ask your health care provider.   Stay away from secondhand smoke.  Stay up to date on all immunizations, including the yearly (annual) flu vaccine.  ImageKeep all follow-up visits as told by your health care provider. This is important.  How to prevent the spread of infection to others     ImageURIs can be passed from person to person (are contagious). To prevent the infection from spreading:    Wash your hands often with soap and water. If soap and water are not available, use hand .  Avoid touching your mouth, face, eyes, or nose.  Cough or sneeze into a tissue or your sleeve or elbow instead of into your hand or into the air.    Contact a health care provider if:  You are getting worse instead of better.  You have a fever or chills.  Your mucus is brown or red.  You have yellow or brown discharge coming from your nose.  You have pain in your face, especially when you bend forward.  You have swollen neck glands.  You have pain while swallowing.  You have white areas in the back of your throat.  Get help right away if:  You have shortness of breath that gets worse.  You have severe or persistent:    Headache.  Ear pain.  Sinus pain.  Chest pain.    You have chronic lung disease along with any of the following:    Wheezing.  Prolonged cough.  Coughing up blood.  A change in your usual mucus.    You have a stiff neck.  You have changes in your:    Vision.  Hearing.  Thinking.  Mood.    Summary  An upper respiratory infection (URI) is a common infection of the nose, throat, and upper air passages that lead to the lungs.  A URI is caused by a virus.  URIs usually get better on their own within 7–10 days.  Medicines cannot cure URIs, but your health care provider may recommend certain medicines to help relieve symptoms.

## 2025-01-17 NOTE — ED PROVIDER NOTE - PATIENT PORTAL LINK FT
You can access the FollowMyHealth Patient Portal offered by [facility] by registering at the following website: [email or web address]. By joining [employer]’s FollowMyHealth portal, you will also be able to view your health information using other applications (apps) compatible with our system.

## 2025-01-17 NOTE — ED PROVIDER NOTE - NS ED ROS FT
Constitutional: (-) fever  Eyes/ENT: (-) blurry vision, (-) epistaxis  Cardiovascular: (-) chest pain, (-) syncope  Respiratory: (-) cough, (-) shortness of breath (+) coughing   Gastrointestinal: (-) vomiting, (-) diarrhea  Musculoskeletal: (-) neck pain, (-) back pain, (-) joint pain  Integumentary: (-) rash, (-) edema  Neurological: (-) headache, (-) altered mental status  Psychiatric: (-) hallucinations  Allergic/Immunologic: (-) pruritus

## 2025-01-30 ENCOUNTER — OUTPATIENT (OUTPATIENT)
Dept: OUTPATIENT SERVICES | Facility: HOSPITAL | Age: 40
LOS: 1 days | End: 2025-01-30
Payer: COMMERCIAL

## 2025-01-30 ENCOUNTER — NON-APPOINTMENT (OUTPATIENT)
Age: 40
End: 2025-01-30

## 2025-01-30 ENCOUNTER — APPOINTMENT (OUTPATIENT)
Dept: OBGYN | Facility: CLINIC | Age: 40
End: 2025-01-30
Payer: COMMERCIAL

## 2025-01-30 VITALS — DIASTOLIC BLOOD PRESSURE: 89 MMHG | BODY MASS INDEX: 42.61 KG/M2 | WEIGHT: 264 LBS | SYSTOLIC BLOOD PRESSURE: 135 MMHG

## 2025-01-30 PROCEDURE — 99212 OFFICE O/P EST SF 10 MIN: CPT

## 2025-01-30 PROCEDURE — 96372 THER/PROPH/DIAG INJ SC/IM: CPT

## 2025-02-03 LAB
HCG UR QL: NEGATIVE
QUALITY CONTROL: YES

## 2025-02-14 ENCOUNTER — OUTPATIENT (OUTPATIENT)
Dept: OUTPATIENT SERVICES | Facility: HOSPITAL | Age: 40
LOS: 1 days | End: 2025-02-14
Payer: SELF-PAY

## 2025-02-14 DIAGNOSIS — Z01.20 ENCOUNTER FOR DENTAL EXAMINATION AND CLEANING WITHOUT ABNORMAL FINDINGS: ICD-10-CM

## 2025-02-14 PROCEDURE — D0274: CPT

## 2025-02-14 PROCEDURE — D0230: CPT

## 2025-02-14 PROCEDURE — D0330: CPT

## 2025-02-14 PROCEDURE — D0220: CPT

## 2025-02-14 PROCEDURE — D0120: CPT

## 2025-02-24 DIAGNOSIS — Z01.20 ENCOUNTER FOR DENTAL EXAMINATION AND CLEANING WITHOUT ABNORMAL FINDINGS: ICD-10-CM

## 2025-05-02 ENCOUNTER — APPOINTMENT (OUTPATIENT)
Dept: OBGYN | Facility: CLINIC | Age: 40
End: 2025-05-02

## 2025-05-02 ENCOUNTER — OUTPATIENT (OUTPATIENT)
Dept: OUTPATIENT SERVICES | Facility: HOSPITAL | Age: 40
LOS: 1 days | End: 2025-05-02
Payer: COMMERCIAL

## 2025-05-02 VITALS — WEIGHT: 272 LBS | DIASTOLIC BLOOD PRESSURE: 83 MMHG | BODY MASS INDEX: 43.9 KG/M2 | SYSTOLIC BLOOD PRESSURE: 135 MMHG

## 2025-05-02 VITALS — BODY MASS INDEX: 43.9 KG/M2 | WEIGHT: 272 LBS | DIASTOLIC BLOOD PRESSURE: 83 MMHG | SYSTOLIC BLOOD PRESSURE: 135 MMHG

## 2025-05-02 DIAGNOSIS — Z00.00 ENCOUNTER FOR GENERAL ADULT MEDICAL EXAMINATION W/OUT ABNORMAL FINDINGS: ICD-10-CM

## 2025-05-02 DIAGNOSIS — Z30.42 ENCOUNTER FOR SURVEILLANCE OF INJECTABLE CONTRACEPTIVE: ICD-10-CM

## 2025-05-02 DIAGNOSIS — Z30.432 ENCOUNTER FOR REMOVAL OF INTRAUTERINE CONTRACEPTIVE DEVICE: ICD-10-CM

## 2025-05-02 PROCEDURE — 96372 THER/PROPH/DIAG INJ SC/IM: CPT

## 2025-05-02 PROCEDURE — 99212 OFFICE O/P EST SF 10 MIN: CPT

## 2025-05-02 RX ORDER — MEDROXYPROGESTERONE ACETATE 150 MG/ML
150 INJECTION, SUSPENSION INTRAMUSCULAR
Qty: 0 | Refills: 0 | Status: COMPLETED | OUTPATIENT
Start: 2025-05-02

## 2025-05-02 RX ADMIN — MEDROXYPROGESTERONE ACETATE 0 MG/ML: 150 INJECTION, SUSPENSION INTRAMUSCULAR at 00:00

## 2025-05-12 DIAGNOSIS — Z00.00 ENCOUNTER FOR GENERAL ADULT MEDICAL EXAMINATION WITHOUT ABNORMAL FINDINGS: ICD-10-CM

## 2025-05-31 ENCOUNTER — EMERGENCY (EMERGENCY)
Facility: HOSPITAL | Age: 40
LOS: 0 days | Discharge: HOME | End: 2025-05-31
Attending: STUDENT IN AN ORGANIZED HEALTH CARE EDUCATION/TRAINING PROGRAM
Payer: MEDICAID

## 2025-05-31 VITALS
TEMPERATURE: 98 F | OXYGEN SATURATION: 97 % | RESPIRATION RATE: 18 BRPM | HEART RATE: 115 BPM | WEIGHT: 266.54 LBS | DIASTOLIC BLOOD PRESSURE: 83 MMHG | HEIGHT: 65 IN | SYSTOLIC BLOOD PRESSURE: 120 MMHG

## 2025-05-31 VITALS — HEART RATE: 99 BPM

## 2025-05-31 DIAGNOSIS — Z23 ENCOUNTER FOR IMMUNIZATION: ICD-10-CM

## 2025-05-31 DIAGNOSIS — K21.9 GASTRO-ESOPHAGEAL REFLUX DISEASE WITHOUT ESOPHAGITIS: ICD-10-CM

## 2025-05-31 DIAGNOSIS — E03.9 HYPOTHYROIDISM, UNSPECIFIED: ICD-10-CM

## 2025-05-31 DIAGNOSIS — F20.9 SCHIZOPHRENIA, UNSPECIFIED: ICD-10-CM

## 2025-05-31 DIAGNOSIS — T23.252A BURN OF SECOND DEGREE OF LEFT PALM, INITIAL ENCOUNTER: ICD-10-CM

## 2025-05-31 DIAGNOSIS — T31.0 BURNS INVOLVING LESS THAN 10% OF BODY SURFACE: ICD-10-CM
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
## 2025-05-31 DIAGNOSIS — F17.200 NICOTINE DEPENDENCE, UNSPECIFIED, UNCOMPLICATED: ICD-10-CM

## 2025-05-31 PROCEDURE — 90715 TDAP VACCINE 7 YRS/> IM: CPT

## 2025-05-31 PROCEDURE — 90471 IMMUNIZATION ADMIN: CPT

## 2025-05-31 PROCEDURE — 99284 EMERGENCY DEPT VISIT MOD MDM: CPT

## 2025-05-31 PROCEDURE — 99285 EMERGENCY DEPT VISIT HI MDM: CPT | Mod: 25

## 2025-05-31 RX ORDER — SILVER SULFADIAZINE 1 %
1 CREAM (GRAM) TOPICAL ONCE
Refills: 0 | Status: COMPLETED | OUTPATIENT
Start: 2025-05-31 | End: 2025-05-31

## 2025-05-31 RX ORDER — IBUPROFEN 200 MG
600 TABLET ORAL ONCE
Refills: 0 | Status: COMPLETED | OUTPATIENT
Start: 2025-05-31 | End: 2025-05-31

## 2025-05-31 RX ADMIN — Medication 600 MILLIGRAM(S): at 12:22

## 2025-05-31 RX ADMIN — Medication 1 APPLICATION(S): at 12:22

## 2025-06-24 ENCOUNTER — EMERGENCY (EMERGENCY)
Facility: HOSPITAL | Age: 40
LOS: 0 days | Discharge: HOME | End: 2025-06-24
Attending: EMERGENCY MEDICINE
Payer: MEDICAID

## 2025-06-24 VITALS
RESPIRATION RATE: 16 BRPM | HEART RATE: 118 BPM | HEIGHT: 65 IN | OXYGEN SATURATION: 95 % | TEMPERATURE: 98 F | SYSTOLIC BLOOD PRESSURE: 136 MMHG | DIASTOLIC BLOOD PRESSURE: 94 MMHG | WEIGHT: 269.4 LBS

## 2025-06-24 PROCEDURE — 99283 EMERGENCY DEPT VISIT LOW MDM: CPT

## 2025-06-24 RX ORDER — CEPHALEXIN 250 MG/1
1 CAPSULE ORAL
Qty: 21 | Refills: 0
Start: 2025-06-24 | End: 2025-06-30

## 2025-06-24 NOTE — ED PROVIDER NOTE - OBJECTIVE STATEMENT
Patient is a 39-year-old female PMH schizophrenia, bipolar disorder, GERD, hypothyroidism, presents ED with complaints of redness to the bilateral lower extremities that began 1 week ago.  Patient reports she has been itching at the area.  Patient states that she has been using a topical medication she has from rash that she is unsure what it is but it turns the skin blue.  Denies fever, chills, CP, SOB, nausea, vomiting, abdominal pain, or calf pain.

## 2025-06-24 NOTE — ED PROVIDER NOTE - NSPTACCESSSVCSAPPTDETAILS_ED_ALL_ED_FT
Please schedule rapid referral for patient who has chronic venous stasis and needs to be seen by vascular

## 2025-06-24 NOTE — ED PROVIDER NOTE - PATIENT PORTAL LINK FT
You can access the FollowMyHealth Patient Portal offered by [facility] by registering at the following website: [email or web address]. By joining [employer]’s FollowMyHealth portal, you will also be able to view your health information using other applications (apps) compatible with our system.

## 2025-06-24 NOTE — ED ADULT NURSE NOTE - PAIN RATING/NUMBER SCALE (0-10): ACTIVITY
Detail Level: Detailed Quality 431: Preventive Care And Screening: Unhealthy Alcohol Use - Screening: Patient screened for unhealthy alcohol use using a single question and scores less than 2 times per year Quality 402: Tobacco Use And Help With Quitting Among Adolescents: Patient screened for tobacco and never smoked Quality 226: Preventive Care And Screening: Tobacco Use: Screening And Cessation Intervention: Patient screened for tobacco use and is an ex/non-smoker 5 (moderate pain)

## 2025-06-24 NOTE — ED PROVIDER NOTE - CLINICAL SUMMARY MEDICAL DECISION MAKING FREE TEXT BOX
39-year-old female, history as noted here for assessment of itching/redness to bilateral lower extremities.  On exam the patient found to have changes consistent with chronic venous stasis dermatitis however also has areas of excoriation with overlying redness concerning for early cellulitis.    Clinically no signs of DVT, abscess.    Will treat with Keflex, advised on need for elevation, compression, vascular follow-up.

## 2025-06-24 NOTE — ED PROVIDER NOTE - PROGRESS NOTE DETAILS
CR: Discussed treatment for concern for superimposed bacterial infection.  Will send antibiotics to pharmacy.  Return precautions discussed at length with patient.  Will give rapid referral to vascular.  Patient to follow-up with PMD this week.

## 2025-06-24 NOTE — ED PROVIDER NOTE - NSFOLLOWUPINSTRUCTIONS_ED_ALL_ED_FT
Cellulitis    Cellulitis is a skin infection caused by bacteria. This condition occurs most often in the arms and lower legs but can occur anywhere over the body. Symptoms include redness, swelling, warm skin, tenderness, and chills/fever. If you were prescribed an antibiotic medicine, take it as told by your health care provider. Do not stop taking the antibiotic even if you start to feel better.    SEEK IMMEDIATE MEDICAL CARE IF YOU HAVE THE FOLLOWING SYMPTOMS: worsening fever, red streaks coming from affected area, vomiting or diarrhea, or dizziness/lightheadedness. Our Emergency Department Referral Coordinators will be reaching out to you in the next 24-48 hours from 9:00am to 5:00pm with a follow up appointment. Please expect a phone call from the hospital in that time frame. If you do not receive a call or if you have any questions or concerns, you can reach them at   [phone].      Stasis Dermatitis    AMBULATORY CARE:    Stasis dermatitis is a condition that develops when blood pools in your lower legs. It is caused by poor blood flow back to your heart.    Common symptoms include the following: Signs and symptoms may develop over time. You may first notice itching and redness on your inner ankles. You also may have any of the following:    Swelling in your lower legs and ankles    Blue or brown spots on your skin    Hard and swollen leg veins    Skin that feels rough, bumpy, thick, or scaly  Call your local emergency number (911 in the [place]) if:    You feel lightheaded, short of breath, and have chest pain.    You cough up blood.  Seek care immediately if:    Your leg feels warm, tender, and painful. It may look swollen and red.    Call your doctor if:    You have a fever.    Your pain is not getting better, even with treatment.    You have new or worse open sores.    Your sores are draining pus.    Your movement is limited.    You have questions or concerns about your condition or care.  Manage your symptoms:    Maintain a healthy weight. Ask your healthcare provider what a healthy weight is for you. Ask him or her to help you create a weight loss plan if you are overweight. This will help improve your blood flow.    Eat a variety of healthy foods. Healthy foods include fruits, vegetables, whole-grain breads, low-fat dairy products, beans, lean meats, and fish. You may need to eat foods that are low in salt to help decrease swelling in your legs.  Healthy Foods      Exercise regularly. Ask your healthcare provider about the best exercise plan for you. Exercise improves the blood flow in your legs.  Diverse Family Walking for Exercise      Wear pressure stockings. These tight elastic stockings put pressure on your legs. This improves blood flow and prevents blood from collecting in your legs.  Pressure Stockings       Elevate your legs above the level of your heart for 30 minutes, 4 times a day. This will help decrease swelling and pain. Prop your legs on pillows or blankets to keep them elevated comfortably.  Elevate Leg      Apply lotions or creams to the area. These help keep your skin moist and decrease itching. Apply the lotion or cream right after a lukewarm bath or shower when your skin is still damp. Use products that do not contain a scent.    Do not scratch your legs. Your skin can break open if you scratch. This can lead to sores or an infection.  Follow up with your doctor as directed: Write down your questions so you remember to ask them during your visits.    Cellulitis    Cellulitis is a skin infection caused by bacteria. This condition occurs most often in the arms and lower legs but can occur anywhere over the body. Symptoms include redness, swelling, warm skin, tenderness, and chills/fever. If you were prescribed an antibiotic medicine, take it as told by your health care provider. Do not stop taking the antibiotic even if you start to feel better.    SEEK IMMEDIATE MEDICAL CARE IF YOU HAVE THE FOLLOWING SYMPTOMS: worsening fever, red streaks coming from affected area, vomiting or diarrhea, or dizziness/lightheadedness.

## 2025-06-24 NOTE — ED ADULT NURSE NOTE - NSICDXPASTMEDICALHX_GEN_ALL_CORE_FT
PAST MEDICAL HISTORY:  Bipolar disorder     GERD (gastroesophageal reflux disease)     Hypothyroidism     Schizophrenia

## 2025-06-24 NOTE — ED ADULT TRIAGE NOTE - CHIEF COMPLAINT QUOTE
Pt. complains of redness and swelling to bilateral lower extremities. Pt. reports symptoms began 2 weeks ago

## 2025-06-24 NOTE — ED PROVIDER NOTE - PHYSICAL EXAMINATION
VITAL SIGNS: I have reviewed nursing notes and confirm.  CONSTITUTIONAL: In no acute distress.  SKIN: Skin exam is warm and dry. Chronic venous stasis changes to the bilateral anterior shins with bluish-greenish discoloration from topical cream, with superimposed honey colored crusting.  No warmth, no fluctuance, no purulent drainage.  HEAD: Normocephalic; atraumatic.  EYES: PERRL, EOM intact; conjunctiva and sclera clear.  NECK: Supple; non tender.  CARD: S1, S2; Regular rate and rhythm.  RESP: No wheezes, rales or rhonchi. Speaking in full sentences.   ABD: Soft, non-tender.   EXT: Normal ROM. DP 2+.  NEURO: Alert, oriented. Grossly unremarkable. No focal deficits.

## 2025-06-25 PROBLEM — K21.9 GASTRO-ESOPHAGEAL REFLUX DISEASE WITHOUT ESOPHAGITIS: Chronic | Status: ACTIVE | Noted: 2025-05-31

## 2025-06-25 PROBLEM — F20.9 SCHIZOPHRENIA, UNSPECIFIED: Chronic | Status: ACTIVE | Noted: 2025-05-31

## 2025-06-25 RX ORDER — CEPHALEXIN 250 MG/1
1 CAPSULE ORAL
Qty: 21 | Refills: 0
Start: 2025-06-25 | End: 2025-07-01

## 2025-06-25 NOTE — ED POST DISCHARGE NOTE - RESULT SUMMARY
CALLED; NEEDS RX FOR KEFLEX RE-SENT TO [facility], AS [facility] CLAIMS THEY NEVER GOT MEDS YESTERDAY. RX SENT

## 2025-06-26 DIAGNOSIS — F17.200 NICOTINE DEPENDENCE, UNSPECIFIED, UNCOMPLICATED: ICD-10-CM

## 2025-06-26 DIAGNOSIS — F20.9 SCHIZOPHRENIA, UNSPECIFIED: ICD-10-CM

## 2025-06-26 DIAGNOSIS — K21.9 GASTRO-ESOPHAGEAL REFLUX DISEASE WITHOUT ESOPHAGITIS: ICD-10-CM

## 2025-06-26 DIAGNOSIS — L03.115 CELLULITIS OF RIGHT LOWER LIMB: ICD-10-CM

## 2025-06-26 DIAGNOSIS — I87.2 VENOUS INSUFFICIENCY (CHRONIC) (PERIPHERAL): ICD-10-CM

## 2025-06-26 DIAGNOSIS — L03.116 CELLULITIS OF LEFT LOWER LIMB: ICD-10-CM

## 2025-06-26 DIAGNOSIS — E03.9 HYPOTHYROIDISM, UNSPECIFIED: ICD-10-CM

## 2025-07-01 ENCOUNTER — OUTPATIENT (OUTPATIENT)
Dept: OUTPATIENT SERVICES | Facility: HOSPITAL | Age: 40
LOS: 1 days | End: 2025-07-01
Payer: COMMERCIAL

## 2025-07-01 DIAGNOSIS — Z00.00 ENCOUNTER FOR GENERAL ADULT MEDICAL EXAMINATION WITHOUT ABNORMAL FINDINGS: ICD-10-CM

## 2025-07-01 PROCEDURE — 86780 TREPONEMA PALLIDUM: CPT

## 2025-07-01 PROCEDURE — 87389 HIV-1 AG W/HIV-1&-2 AB AG IA: CPT

## 2025-07-01 PROCEDURE — 86803 HEPATITIS C AB TEST: CPT

## 2025-07-01 PROCEDURE — 87340 HEPATITIS B SURFACE AG IA: CPT

## 2025-07-02 ENCOUNTER — NON-APPOINTMENT (OUTPATIENT)
Age: 40
End: 2025-07-02

## 2025-07-02 DIAGNOSIS — Z00.00 ENCOUNTER FOR GENERAL ADULT MEDICAL EXAMINATION WITHOUT ABNORMAL FINDINGS: ICD-10-CM

## 2025-07-29 ENCOUNTER — APPOINTMENT (OUTPATIENT)
Dept: OBGYN | Facility: CLINIC | Age: 40
End: 2025-07-29
Payer: COMMERCIAL

## 2025-07-29 ENCOUNTER — OUTPATIENT (OUTPATIENT)
Dept: OUTPATIENT SERVICES | Facility: HOSPITAL | Age: 40
LOS: 1 days | End: 2025-07-29
Payer: COMMERCIAL

## 2025-07-29 VITALS — WEIGHT: 267 LBS | SYSTOLIC BLOOD PRESSURE: 135 MMHG | DIASTOLIC BLOOD PRESSURE: 90 MMHG | BODY MASS INDEX: 43.1 KG/M2

## 2025-07-29 DIAGNOSIS — Z30.42 ENCOUNTER FOR SURVEILLANCE OF INJECTABLE CONTRACEPTIVE: ICD-10-CM

## 2025-07-29 PROCEDURE — 99212 OFFICE O/P EST SF 10 MIN: CPT

## 2025-07-29 PROCEDURE — 96372 THER/PROPH/DIAG INJ SC/IM: CPT

## 2025-07-29 RX ORDER — MEDROXYPROGESTERONE ACETATE 150 MG/ML
150 INJECTION, SUSPENSION INTRAMUSCULAR
Qty: 0 | Refills: 0 | Status: COMPLETED | OUTPATIENT
Start: 2025-07-29

## 2025-07-29 RX ADMIN — MEDROXYPROGESTERONE ACETATE 0 MG/ML: 150 INJECTION, SUSPENSION INTRAMUSCULAR at 00:00

## 2025-07-30 DIAGNOSIS — Z30.42 ENCOUNTER FOR SURVEILLANCE OF INJECTABLE CONTRACEPTIVE: ICD-10-CM

## 2025-08-14 ENCOUNTER — OUTPATIENT (OUTPATIENT)
Dept: OUTPATIENT SERVICES | Facility: HOSPITAL | Age: 40
LOS: 1 days | End: 2025-08-14

## 2025-08-14 DIAGNOSIS — K02.52 DENTAL CARIES ON PIT AND FISSURE SURFACE PENETRATING INTO DENTIN: ICD-10-CM

## 2025-08-15 DIAGNOSIS — K02.9 DENTAL CARIES, UNSPECIFIED: ICD-10-CM
